# Patient Record
Sex: FEMALE | Race: WHITE | NOT HISPANIC OR LATINO | Employment: FULL TIME | URBAN - METROPOLITAN AREA
[De-identification: names, ages, dates, MRNs, and addresses within clinical notes are randomized per-mention and may not be internally consistent; named-entity substitution may affect disease eponyms.]

---

## 2023-08-30 ENCOUNTER — HOSPITAL ENCOUNTER (OUTPATIENT)
Dept: DATA CONVERSION | Facility: HOSPITAL | Age: 48
Discharge: HOME | End: 2023-08-30
Payer: COMMERCIAL

## 2023-08-30 DIAGNOSIS — M45.3 ANKYLOSING SPONDYLITIS OF CERVICOTHORACIC REGION (MULTI): ICD-10-CM

## 2023-09-01 PROBLEM — K21.9 GASTROESOPHAGEAL REFLUX DISEASE WITHOUT ESOPHAGITIS: Status: ACTIVE | Noted: 2022-06-21

## 2023-09-01 PROBLEM — K22.2 SCHATZKI'S RING: Status: ACTIVE | Noted: 2023-09-01

## 2023-09-01 PROBLEM — N95.1 PERIMENOPAUSE: Status: ACTIVE | Noted: 2023-09-01

## 2023-09-01 PROBLEM — N92.6 IRREGULAR MENSES: Status: ACTIVE | Noted: 2023-09-01

## 2023-09-01 PROBLEM — J37.0 CHRONIC LARYNGITIS: Status: ACTIVE | Noted: 2022-06-21

## 2023-09-01 PROBLEM — R40.0 DAYTIME SOMNOLENCE: Status: ACTIVE | Noted: 2023-09-01

## 2023-09-01 PROBLEM — O09.529: Status: ACTIVE | Noted: 2023-09-01

## 2023-09-01 PROBLEM — R13.10 DYSPHAGIA: Status: ACTIVE | Noted: 2022-06-21

## 2023-09-01 RX ORDER — SOD SULF/POT CHLORIDE/MAG SULF 1.479 G
TABLET ORAL
COMMUNITY
Start: 2022-10-04 | End: 2023-10-26 | Stop reason: ALTCHOICE

## 2023-09-01 RX ORDER — IBUPROFEN 600 MG/1
600 TABLET ORAL EVERY 6 HOURS PRN
COMMUNITY

## 2023-09-01 RX ORDER — ACETAMINOPHEN 325 MG/1
650 TABLET ORAL EVERY 6 HOURS PRN
COMMUNITY
End: 2024-05-30 | Stop reason: ALTCHOICE

## 2023-09-01 RX ORDER — PNV NO.95/FERROUS FUM/FOLIC AC 28MG-0.8MG
TABLET ORAL
COMMUNITY

## 2023-09-01 RX ORDER — ESCITALOPRAM OXALATE 10 MG/1
10 TABLET ORAL DAILY
COMMUNITY
Start: 2022-09-23 | End: 2023-10-26

## 2023-09-01 RX ORDER — DULOXETIN HYDROCHLORIDE 30 MG/1
1 CAPSULE, DELAYED RELEASE ORAL DAILY
COMMUNITY
End: 2023-10-26

## 2023-09-01 RX ORDER — TALC
3 POWDER (GRAM) TOPICAL NIGHTLY
COMMUNITY
End: 2023-10-26

## 2023-09-01 RX ORDER — DULOXETIN HYDROCHLORIDE 60 MG/1
1 CAPSULE, DELAYED RELEASE ORAL DAILY
COMMUNITY
End: 2023-10-23 | Stop reason: SDUPTHER

## 2023-09-01 RX ORDER — CYCLOBENZAPRINE HCL 5 MG
5 TABLET ORAL 2 TIMES DAILY
COMMUNITY
Start: 2023-04-25 | End: 2023-10-26 | Stop reason: ALTCHOICE

## 2023-09-01 RX ORDER — VITS A,C,E/LUTEIN/MINERALS 300MCG-200
400 TABLET ORAL DAILY
COMMUNITY
End: 2024-02-09 | Stop reason: ALTCHOICE

## 2023-09-01 RX ORDER — NORETHINDRONE ACETATE AND ETHINYL ESTRADIOL, ETHINYL ESTRADIOL AND FERROUS FUMARATE 1MG-10(24)
1 KIT ORAL DAILY
COMMUNITY
Start: 2022-09-23 | End: 2023-11-29 | Stop reason: WASHOUT

## 2023-09-01 RX ORDER — OMEPRAZOLE 40 MG/1
40 CAPSULE, DELAYED RELEASE ORAL DAILY
COMMUNITY
Start: 2022-07-19 | End: 2024-02-09 | Stop reason: ALTCHOICE

## 2023-09-16 VITALS
HEIGHT: 61 IN | WEIGHT: 146.4 LBS | BODY MASS INDEX: 27.64 KG/M2 | HEART RATE: 98 BPM | SYSTOLIC BLOOD PRESSURE: 118 MMHG | TEMPERATURE: 96.2 F | OXYGEN SATURATION: 100 % | DIASTOLIC BLOOD PRESSURE: 74 MMHG

## 2023-10-16 ENCOUNTER — APPOINTMENT (OUTPATIENT)
Dept: RADIOLOGY | Facility: CLINIC | Age: 48
End: 2023-10-16
Payer: COMMERCIAL

## 2023-10-20 ENCOUNTER — TELEPHONE (OUTPATIENT)
Dept: PAIN MEDICINE | Facility: CLINIC | Age: 48
End: 2023-10-20
Payer: COMMERCIAL

## 2023-10-20 NOTE — TELEPHONE ENCOUNTER
Coverage for this service has been denied for the following reason(s):    Based on [facility] Guidelines Section(s): Lower Extremity Pain with  Neurological Features (Radiculopathy, Radiculitis, or Plexopathy and Neuropathy) with or  without Low Back (Lumbar Spine) Pain (SP 6.1) and 1.0 General Guidelines, we cannot approve  this request.     Your healthcare provider told us that you are having lower back pain that travels to  your hip and/or leg. The request cannot be approved because:    Your records do not show documentation of physical exam results that included a detailed  nervous system (a network of nerve cells and fibers that send nerve messages between parts of  your body) exam performed after your symptoms started or changed that support imaging.    Imaging requires six weeks of provider directed treatment to be completed. This must have been  completed in the past three months without improved symptoms. Contact (via office visit, phone,  email, or messaging) must occur after the treatment is completed. This has not been met because:  You have not completed six weeks of provider directed treatment.  The provider directed treatment did not occur within the last three months.  Symptoms must be the same or worse after treatment to support imaging.  There was no contact with your provider after completing treatment.

## 2023-10-20 NOTE — TELEPHONE ENCOUNTER
Patient was inquiring about her MRI of the lumbar spine as she was scheduled to have it done on Monday 10-23. After further investigation, this was denied. Reason for denial below. Patient states that she has not completed PT. She states that she will call and schedule that.

## 2023-10-23 ENCOUNTER — APPOINTMENT (OUTPATIENT)
Dept: RADIOLOGY | Facility: CLINIC | Age: 48
End: 2023-10-23
Payer: COMMERCIAL

## 2023-10-23 DIAGNOSIS — F32.A DEPRESSION, UNSPECIFIED DEPRESSION TYPE: Primary | ICD-10-CM

## 2023-10-23 RX ORDER — DULOXETIN HYDROCHLORIDE 60 MG/1
60 CAPSULE, DELAYED RELEASE ORAL DAILY
Qty: 90 CAPSULE | Refills: 1 | Status: SHIPPED | OUTPATIENT
Start: 2023-10-23 | End: 2024-05-02 | Stop reason: SDUPTHER

## 2023-10-23 NOTE — TELEPHONE ENCOUNTER
Pt call for refill on meds that have been populated, pharmacy as well. Med review 06/21/2023 Please advise

## 2023-10-26 ENCOUNTER — HOSPITAL ENCOUNTER (OUTPATIENT)
Dept: GASTROENTEROLOGY | Facility: HOSPITAL | Age: 48
Discharge: HOME | End: 2023-10-26
Payer: COMMERCIAL

## 2023-10-26 ENCOUNTER — HOSPITAL ENCOUNTER (OUTPATIENT)
Dept: RADIOLOGY | Facility: HOSPITAL | Age: 48
Discharge: HOME | End: 2023-10-26
Payer: COMMERCIAL

## 2023-10-26 VITALS
WEIGHT: 145 LBS | HEART RATE: 62 BPM | RESPIRATION RATE: 17 BRPM | TEMPERATURE: 97.3 F | HEIGHT: 60 IN | BODY MASS INDEX: 28.47 KG/M2 | SYSTOLIC BLOOD PRESSURE: 130 MMHG | DIASTOLIC BLOOD PRESSURE: 90 MMHG | OXYGEN SATURATION: 99 %

## 2023-10-26 DIAGNOSIS — M54.12 CERVICAL RADICULOPATHY: Primary | ICD-10-CM

## 2023-10-26 PROCEDURE — 62321 NJX INTERLAMINAR CRV/THRC: CPT | Performed by: ANESTHESIOLOGY

## 2023-10-26 PROCEDURE — 76000 FLUOROSCOPY <1 HR PHYS/QHP: CPT

## 2023-10-26 RX ORDER — GABAPENTIN 300 MG/1
300 CAPSULE ORAL 3 TIMES DAILY
COMMUNITY
End: 2023-10-27 | Stop reason: SDUPTHER

## 2023-10-26 RX ORDER — SECUKINUMAB 150 MG/ML
150 INJECTION SUBCUTANEOUS ONCE
COMMUNITY
End: 2024-02-09 | Stop reason: ALTCHOICE

## 2023-10-26 ASSESSMENT — PAIN SCALES - GENERAL
PAINLEVEL_OUTOF10: 8

## 2023-10-26 ASSESSMENT — ENCOUNTER SYMPTOMS
PSYCHIATRIC NEGATIVE: 1
GASTROINTESTINAL NEGATIVE: 1
EYES NEGATIVE: 1
HEMATOLOGIC/LYMPHATIC NEGATIVE: 1
CONSTITUTIONAL NEGATIVE: 1
RESPIRATORY NEGATIVE: 1
CARDIOVASCULAR NEGATIVE: 1
NUMBNESS: 1
NECK PAIN: 1
ENDOCRINE NEGATIVE: 1
WEAKNESS: 1

## 2023-10-26 ASSESSMENT — PAIN DESCRIPTION - DESCRIPTORS
DESCRIPTORS: SHOOTING
DESCRIPTORS: ACHING;NAGGING;THROBBING
DESCRIPTORS: THROBBING
DESCRIPTORS: SHOOTING

## 2023-10-26 ASSESSMENT — PAIN - FUNCTIONAL ASSESSMENT: PAIN_FUNCTIONAL_ASSESSMENT: 0-10

## 2023-10-26 ASSESSMENT — COLUMBIA-SUICIDE SEVERITY RATING SCALE - C-SSRS
1. IN THE PAST MONTH, HAVE YOU WISHED YOU WERE DEAD OR WISHED YOU COULD GO TO SLEEP AND NOT WAKE UP?: NO
6. HAVE YOU EVER DONE ANYTHING, STARTED TO DO ANYTHING, OR PREPARED TO DO ANYTHING TO END YOUR LIFE?: NO
2. HAVE YOU ACTUALLY HAD ANY THOUGHTS OF KILLING YOURSELF?: NO

## 2023-10-26 NOTE — H&P
PAIN MANAGEMENT H&P    Date of Service: 10/26/2023  SUBJECTIVE    CHIEF COMPLAINT: neck pain    HISTORY OF PRESENT ILLNESS    Meghan Hernández is a 47 y.o. old female with PMH MDD, RA, ankylosing spondylitis who presents for TIM    Pain and overall medical condition unchanged from previous visit. Pt denies new-onset numbness, weakness, bowel/bladder incontinence.  Pt denies recent infection/abx use, allergy to Latex/iodine/contrast. Patient is currently taking the following blood thinner(s): N/A    REVIEW OF SYSTEMS  Review of Systems   Constitutional: Negative.    HENT: Negative.     Eyes: Negative.    Respiratory: Negative.     Cardiovascular: Negative.    Gastrointestinal: Negative.    Endocrine: Negative.    Musculoskeletal:  Positive for neck pain.   Skin: Negative.    Neurological:  Positive for weakness and numbness.   Hematological: Negative.    Psychiatric/Behavioral: Negative.         PAST MEDICAL HISTORY  History reviewed. No pertinent past medical history.  History reviewed. No pertinent surgical history.  Family History   Problem Relation Name Age of Onset    Thyroid disease Mother      Colon cancer Father      Thyroid disease Sister      Cerebral palsy Brother          1/2 brother    Stomach cancer Maternal Grandmother      Breast cancer Other AUNT     Heart attack Other UNCLE        CURRENT MEDICATIONS  Current Outpatient Medications   Medication Sig Dispense Refill    acetaminophen (TylenoL) 325 mg tablet Take 2 tablets (650 mg) by mouth every 6 hours if needed for mild pain (1 - 3).      cyanocobalamin (Vitamin B-12) 100 mcg tablet Take by mouth. As directed      DULoxetine (Cymbalta) 60 mg DR capsule Take 1 capsule (60 mg) by mouth once daily. 90 capsule 1    gabapentin (Neurontin) 300 mg capsule Take 1 capsule (300 mg) by mouth 3 times a day.      ibuprofen 600 mg tablet Take 1 tablet (600 mg) by mouth every 6 hours if needed for mild pain (1 - 3). FOR PATIENT RECEIVING KETOROLAC.  DO NO GIVE  IBUPROFEN FOR 6 HRS FOLLOWING KETOROLAC ADMIN.  NOT TO EXCEED 5 TABLES IN 24 HOURS      Lo Loestrin Fe 1 mg-10 mcg (24)/10 mcg (2) tablet Take 1 tablet by mouth once daily.      magnesium oxide (Mag-Ox) 200 mg magnesium tablet Take 2 tablets (400 mg) by mouth once daily. WITH A MEAL      omeprazole (PriLOSEC) 40 mg DR capsule Take 1 capsule (40 mg) by mouth once daily.      prenatal vit no.124/iron/folic (PRENATAL VITAMIN ORAL)       secukinumab (Cosentyx) 150 mg/mL syringe 1 mL (150 mg) 1 time. monthly       No current facility-administered medications for this encounter.       ALLERGIES AND DRUG REACTIONS  No Known Allergies       OBJECTIVE  Visit Vitals  BP (!) 146/106   Pulse 90   Temp 36.3 °C (97.3 °F) (Temporal)   Resp 16   Ht 1.524 m (5')   Wt 65.8 kg (145 lb)   SpO2 100%   BMI 28.32 kg/m²   OB Status Perimenopausal   Smoking Status Former   BSA 1.67 m²     General: Lying comfortably in bed, NAD  Head: NCAT  Eyes: Sclera/conjunctiva clear, EOMI, PERRL  Nose/mouth: MMM  CV: Good distal pulses  Lungs: Good/equal chest excursion  Abdomen: Soft, ND  Ext: No cyanosis/edema  MSK: Able to move extremities  Neuro: AAOx3, grossly normal  Psych: affect nl        REVIEW OF RADIOLOGY DATA  I have reviewed the following:  Radiology Studies           MRI c-spine 8/30/23:  There is loss of the normal cervical lordosis. No acute fracture is  identified. There is grade 1 anterolisthesis C4 on C5. There are posterior  osteophytes extending from the C3-C6 levels. No  STIR abnormalities are seen  within the marrow.     The visualized cord signal is grossly unremarkable.     C2-C3: No disc herniation. No significant canal or foraminal stenosis.  C3-C4: There is a posterior disc osteophyte complex. No significant canal or  foraminal stenosis.  C4-C5: There is a posterior disc osteophyte complex. No significant canal or  foraminal stenosis.  C5-C6: There is a posterior disc osteophyte complex. There is facet  osteoarthropathy. No  significant canal stenosis. Mild right neural foraminal  narrowing.  C6-C7: There is a posterior disc osteophyte complex. There is facet  osteoarthropathy. No significant canal stenosis. Mild right neural foraminal  narrowing.  C7-T1: No disc herniation. No significant canal or foraminal stenosis.     The paravertebral soft tissues are unremarkable.     IMPRESSION:  Mild multilevel cervical spondylosis most severe at C5-C6 and C6-C7  minimally progressed as compared to the prior examination from 2014.     ASSESSMENT & PLAN  Meghan Hernández is a 47 y.o. old female with PMH MDD, RA, ankylosing spondylitis who presents for TIM     1) Cervical radic  Neck pain since 2021 radiating to right upper extremity with subjective fourth and fifth digit numbness, tingling, weakness. Possible contribution from rheumatoid arthritis, ankylosing spondylitis  -Refractive to Tylenol, NSAIDs, muscle relaxants, Cymbalta, topical lidocaine  -MRI C-spine 8/30/2023: Multilevel spondylosis featuring grade 1 C4-5 listhesis, severe facet arthropathy at C5-6 and C6-7 with right neuroforaminal stenosis  -Referral to physical therapy to maximize conservative treatment  -Schedule cervical epidural steroid injection today to target pain generators visualized on MRI, to minimize risk of opioid use, to minimize likelihood of surgery, the complements physical therapy  -Gabapentin 300 mg TID    2) lumbosacral radiculitis  Low back pain since prior to 2008 with right leg pain, numbness, weakness. Although there is appreciable weakness in bilateral lower extremities, this appears global and more likely related to deconditioning rather than radiculopathy. Pain, however, may be related to ankylosing spondylitis  -Refractive to Tylenol, NSAIDs, muscle relaxants, Cymbalta, topical lidocaine, chiropractics  -x-ray lumbar spine 5/18/2023: L5-S1 facet arthropathy  -F/U MRI lumbar spine to evaluate neuraxial pathology in context of over 1 decade of pain, pain  severity (7/10), failure to respond to conservative treatment, interventional planning  -F/U PT  -Gabapentin as above      Discussed procedure risks/benefits in detail with patient. Pt meets medical necessity for procedure due to failure of conservative measures. Reviewed procedural risks including bleeding, infection, nerve damage, paralysis. Also reviewed mitigating factors such as screening for infection/blood thinner use, sterile precautions, and image-guidance when applicable. All questions answered. Pt/guardian expressed understanding and choose to proceed            Sofiya Mandujano MD  Anesthesiologist & Interventional Pain Physician   [facility]  O: [phone]  F: [phone]  2:36 PM  10/26/23

## 2023-10-26 NOTE — OP NOTE
Cervical Epidural Procedure Note    Procedure: The risks and benefits of treatment options and alternatives were discussed with the patient, and consent was obtained for a cervical epidural steroid injection. She wishes to proceed. She was placed in a prone position. With fluoroscopic assistance, the C7 and T1 interspace was identified. After chlorhexidine and 1% lidocaine local infiltration, an 18- gauge Touhy needle was inserted. Using cautious loss of resistance to saline technique, firm loss of resistance was appreciated at 6 cm. Aspiration revealed no cerebrospinal fluid, blood, or air.     3 cc of Omnipaque contrast was injected which revealed spread within the epidural space and a normal bilateral epidurogram on fluoroscopy. This was confirmed with AP and lateral views. After negative aspiration, 80 mg triamcinolone + 2 ml NS was easily injected for total of 4 ml injectate.    /88   Pulse 89   Temp 36.3 °C (97.3 °F) (Temporal)   Resp 16   Ht 1.524 m (5')   Wt 65.8 kg (145 lb)   SpO2 99%   BMI 28.32 kg/m²       There were no complications, and the patient tolerated the procedure well. There was no numbness or weakness at the time of discharge. She was instructed to await the effects of the steroid over the next several days, to continue with physical therapy as tolerated, and follow up with me 2 weeks.    Sofiya Mandujano MD  Interventional Pain Physician  On license of [facility]  2:40 PM  10/26/23

## 2023-10-26 NOTE — DISCHARGE INSTRUCTIONS
DISCHARGEINSTRUCTIONS FOR INJECTIONS     You underwent cervical epidural steroid injection today    Aftermost injections, it is recommended that you relax and limit your activity for the remainder of the day unless you have been told otherwise by your pain physician.  You should not drive a car, operate machinery, or make important legal decisions unless otherwise directed by your pain physician.  You may resume your normal activity, including exercise, tomorrow.      Keep a written pain diary of how much pain relief you experienced following the injection procedure and the length of time of pain relief you experienced pain relief. Following diagnostic injections like medial branch nerve blocks, sacroiliac joint blocks, stellate ganglion injections and other blocks, it is very important you record the specific amount of pain relief you experienced immediately after the injectionand how long it lasted. Your doctor will ask you for this information at your follow up visit.     For all injections, please keep the injection site dry and inspect the site for a couple of days. You may remove the Band-Aid the day of the injection at any time.     Some discomfort, bruising or slight swelling may occur at the injection site. This is not abnormal if it occurs.  If needed you may:    -Take over the counter medication such as Tylenol or Motrin.   -Apply an ice pack for 30 minutes, 2 to 3 times a day for the first 24 hours.     You may shower today; no soaking baths, hot tubs, whirlpools or swimming pools for two days.      If you are given steroids in your injection, it may take 3-5 days for the steroid medication to take effect. You may notice a worsening of your symptoms for 1-2 days after the injection. This is not abnormal.  You may use acetaminophen, ibuprofen, or prescription medication that your doctor may have prescribed for you if you need to do so.     A few common side effects of steroids include facial flushing,  sweating, restlessness, irritability,difficulty sleeping, increase in blood sugar, and increased blood pressure. If you have diabetes, please monitor your blood sugar at least once a day for at least 5 days. If you have poorly controlled high blood pressure, monitoryour blood pressure for at least 2 days and contact your primary care physician if these numbers are unusually high for you.      If you take aspirin or non-steroidal anti-inflammatory drugs (examples are Motrin, Advil, ibuprofen, Naprosyn, Voltaren, Relafen, etc.) you may restart these this evening, but stop taking it 3 days before your next appointment, unless instructed otherwiseby your physician.      You do not need to discontinue non-aspirin-containing pain medications prior to an injection (examples: Celebrex, tramadol, hydrocodone and acetaminophen).      If you take a blood thinning medication (Coumadin, Lovenox, Fragmin,Ticlid, Plavix, Pradaxa, etc.), please discuss this with your primary care physician/cardiologist and your pain physician. These medications MUST be discontinued before you can have an injection safely, without the risk of uncontrolled bleeding. If these medications are not discontinued for an appropriate period of time, you will not be able to receivean injection.      If you are taking Coumadin, please have your INR checked the morning of your procedure and bringthe result to your appointment unless otherwise instructed. If your INR is over 1.2, your injection may need to be rescheduled to avoid uncontrolled bleeding from the needle placement.     Call [facility] at [phone] between 8am-4pm Monday - Friday if you are experiencing the following:    If you received an epidural or spinal injection:    -Headache that doesnot go away with medicine, is worse when sitting or standing up, and is greatly relieved upon lying down.   -Severe pain worse than or different than your baseline pain.   -Chills or fever  (101º F or greater).   -Drainage or signs of infection at the injection site     Go directly to the Emergency Department if you are experiencing the following and received an epidural or spinal injection:   -Abrupt weakness or progressive weakness in your legs that starts after you leave the clinic.   -Abrupt severe or worsening numbness in your legs.   -Inability to urinate after the injection or loss of bowel or bladder control without the urge to defecate or urinate.     If you have a clinical question that cannot wait until your next appointment, please call [phone] between 8am-4pm Monday - Friday or send a [employer] message. We do our best to return all non-emergency messages within 24 hours, Monday - Friday. A nurse or physician will return your message.      If you need to cancel an appointment, please call the scheduling staff at [phone] during normal business hours or leave a message at least 24 hours in advance.     If you are going to be sedated for your next procedure, you MUST have responsible adult who can legally drive accompany you home. You cannot eat or drink for eight hours prior to the planned procedure if you are going to receive sedation. You may take your non-blood thinning medications with a small sip of water.

## 2023-10-27 DIAGNOSIS — M54.12 CERVICAL RADICULOPATHY: Primary | ICD-10-CM

## 2023-10-27 RX ORDER — GABAPENTIN 300 MG/1
300 CAPSULE ORAL 3 TIMES DAILY
Qty: 270 CAPSULE | Refills: 0 | Status: SHIPPED | OUTPATIENT
Start: 2023-10-27 | End: 2023-11-10 | Stop reason: ALTCHOICE

## 2023-10-27 NOTE — TELEPHONE ENCOUNTER
Patient is calling for a refill of her gabapentin 300 mg.  She is also asking if the does could possibly be increased to help with her pain.  Next follow up is 11/10/23

## 2023-11-07 ENCOUNTER — TELEPHONE (OUTPATIENT)
Dept: PRIMARY CARE | Facility: CLINIC | Age: 48
End: 2023-11-07
Payer: COMMERCIAL

## 2023-11-07 DIAGNOSIS — M54.12 CERVICAL RADICULOPATHY: Primary | ICD-10-CM

## 2023-11-07 RX ORDER — METHOCARBAMOL 750 MG/1
750 TABLET, FILM COATED ORAL 4 TIMES DAILY
Qty: 360 TABLET | Refills: 1 | Status: SHIPPED | OUTPATIENT
Start: 2023-11-07 | End: 2024-01-03 | Stop reason: SDUPTHER

## 2023-11-07 RX ORDER — METHOCARBAMOL 750 MG/1
750 TABLET, FILM COATED ORAL 4 TIMES DAILY
COMMUNITY
Start: 2023-06-21 | End: 2023-11-07 | Stop reason: SDUPTHER

## 2023-11-07 NOTE — TELEPHONE ENCOUNTER
Pt called in for refill on a med that I dont see in the list states its a muscle relaxer call Methocarmbamol 750mg 2 every 8 hours.  Pharmacy verified and populated. Please advise

## 2023-11-10 ENCOUNTER — OFFICE VISIT (OUTPATIENT)
Dept: PAIN MEDICINE | Facility: CLINIC | Age: 48
End: 2023-11-10
Payer: COMMERCIAL

## 2023-11-10 VITALS
RESPIRATION RATE: 18 BRPM | HEIGHT: 60 IN | DIASTOLIC BLOOD PRESSURE: 89 MMHG | SYSTOLIC BLOOD PRESSURE: 139 MMHG | WEIGHT: 146 LBS | BODY MASS INDEX: 28.66 KG/M2 | HEART RATE: 83 BPM

## 2023-11-10 DIAGNOSIS — M54.2 CERVICALGIA: ICD-10-CM

## 2023-11-10 DIAGNOSIS — M54.12 CERVICAL RADICULOPATHY: Primary | ICD-10-CM

## 2023-11-10 DIAGNOSIS — M54.16 LUMBAR RADICULOPATHY: ICD-10-CM

## 2023-11-10 DIAGNOSIS — G89.29 CHRONIC BILATERAL LOW BACK PAIN, UNSPECIFIED WHETHER SCIATICA PRESENT: ICD-10-CM

## 2023-11-10 DIAGNOSIS — M54.50 CHRONIC BILATERAL LOW BACK PAIN, UNSPECIFIED WHETHER SCIATICA PRESENT: ICD-10-CM

## 2023-11-10 PROCEDURE — 99214 OFFICE O/P EST MOD 30 MIN: CPT | Performed by: ANESTHESIOLOGY

## 2023-11-10 PROCEDURE — 1036F TOBACCO NON-USER: CPT | Performed by: ANESTHESIOLOGY

## 2023-11-10 RX ORDER — GABAPENTIN 600 MG/1
600 TABLET ORAL 3 TIMES DAILY
Qty: 90 TABLET | Refills: 1 | Status: SHIPPED | OUTPATIENT
Start: 2023-11-10 | End: 2024-01-03

## 2023-11-10 ASSESSMENT — PATIENT HEALTH QUESTIONNAIRE - PHQ9
10. IF YOU CHECKED OFF ANY PROBLEMS, HOW DIFFICULT HAVE THESE PROBLEMS MADE IT FOR YOU TO DO YOUR WORK, TAKE CARE OF THINGS AT HOME, OR GET ALONG WITH OTHER PEOPLE: VERY DIFFICULT
6. FEELING BAD ABOUT YOURSELF - OR THAT YOU ARE A FAILURE OR HAVE LET YOURSELF OR YOUR FAMILY DOWN: NOT AT ALL
7. TROUBLE CONCENTRATING ON THINGS, SUCH AS READING THE NEWSPAPER OR WATCHING TELEVISION: SEVERAL DAYS
SUM OF ALL RESPONSES TO PHQ QUESTIONS 1-9: 16
8. MOVING OR SPEAKING SO SLOWLY THAT OTHER PEOPLE COULD HAVE NOTICED. OR THE OPPOSITE, BEING SO FIGETY OR RESTLESS THAT YOU HAVE BEEN MOVING AROUND A LOT MORE THAN USUAL: NEARLY EVERY DAY
5. POOR APPETITE OR OVEREATING: NOT AT ALL
3. TROUBLE FALLING OR STAYING ASLEEP OR SLEEPING TOO MUCH: NEARLY EVERY DAY
2. FEELING DOWN, DEPRESSED OR HOPELESS: NEARLY EVERY DAY
4. FEELING TIRED OR HAVING LITTLE ENERGY: NEARLY EVERY DAY
SUM OF ALL RESPONSES TO PHQ9 QUESTIONS 1 AND 2: 6
9. THOUGHTS THAT YOU WOULD BE BETTER OFF DEAD, OR OF HURTING YOURSELF: NOT AT ALL
1. LITTLE INTEREST OR PLEASURE IN DOING THINGS: NEARLY EVERY DAY

## 2023-11-10 ASSESSMENT — PAIN SCALES - GENERAL
PAINLEVEL: 5
PAINLEVEL_OUTOF10: 5 - MODERATE PAIN

## 2023-11-10 ASSESSMENT — PAIN - FUNCTIONAL ASSESSMENT: PAIN_FUNCTIONAL_ASSESSMENT: 0-10

## 2023-11-10 ASSESSMENT — PAIN DESCRIPTION - DESCRIPTORS: DESCRIPTORS: ACHING

## 2023-11-10 NOTE — PROGRESS NOTES
PAIN MANAGEMENT FOLLOW-UP OFFICE NOTE    Date of Service: 11/10/2023    SUBJECTIVE    CHIEF COMPLAINT: neck pain    HISTORY OF PRESENT ILLNESS    Meghan Hernández is a 48 y.o. female with PMH MDD, RA, ankylosing spondylitis  who presents for follow-up neck pain.    Pt describes 100% relief RUE pain and 40% relief axial neck pain since TIM 10/26. Since that time, neck pain has persisted and is most noticeable with turning head to left. Pt does feel crepitus on occasion.    Pt also endorses persistence of LBP radiating to BLE assoc with weakness. MRI L-spine denied. In meantime, pt endorses hx >6 w PT without significant improvement. She is s/f return to PT next week. Taking gabapentin with mild relief w/o SE.     Pt denies new-onset numbness, weakness, bowel/bladder incontinence.  Pt denies recent infection, allergy to Latex/iodine/contrast. Patient is currently taking the following blood thinner(s): N/A    Procedure Log:  C7-T1 GINA 10/26/23: 100% relief RUE, 40% neck    REVIEW OF SYSTEMS  Review of Systems    PAST MEDICAL HISTORY  History reviewed. No pertinent past medical history.  History reviewed. No pertinent surgical history.  Family History   Problem Relation Name Age of Onset    Thyroid disease Mother      Colon cancer Father      Thyroid disease Sister      Cerebral palsy Brother          1/2 brother    Stomach cancer Maternal Grandmother      Breast cancer Other AUNT     Heart attack Other UNCLE        CURRENT MEDICATIONS  Current Outpatient Medications   Medication Sig Dispense Refill    acetaminophen (TylenoL) 325 mg tablet Take 2 tablets (650 mg) by mouth every 6 hours if needed for mild pain (1 - 3).      cyanocobalamin (Vitamin B-12) 100 mcg tablet Take by mouth. As directed      DULoxetine (Cymbalta) 60 mg DR capsule Take 1 capsule (60 mg) by mouth once daily. 90 capsule 1    gabapentin (Neurontin) 300 mg capsule Take 1 capsule (300 mg) by mouth 3 times a day. 270 capsule 0    ibuprofen 600 mg tablet  Take 1 tablet (600 mg) by mouth every 6 hours if needed for mild pain (1 - 3). FOR PATIENT RECEIVING KETOROLAC.  DO NO GIVE IBUPROFEN FOR 6 HRS FOLLOWING KETOROLAC ADMIN.  NOT TO EXCEED 5 TABLES IN 24 HOURS      Lo Loestrin Fe 1 mg-10 mcg (24)/10 mcg (2) tablet Take 1 tablet by mouth once daily.      magnesium oxide (Mag-Ox) 200 mg magnesium tablet Take 2 tablets (400 mg) by mouth once daily. WITH A MEAL      methocarbamol (Robaxin) 750 mg tablet Take 1 tablet (750 mg) by mouth 4 times a day. 360 tablet 1    omeprazole (PriLOSEC) 40 mg DR capsule Take 1 capsule (40 mg) by mouth once daily.      prenatal vit no.124/iron/folic (PRENATAL VITAMIN ORAL)       secukinumab (Cosentyx) 150 mg/mL syringe 1 mL (150 mg) 1 time. monthly       No current facility-administered medications for this visit.       ALLERGIES AND DRUG REACTIONS  No Known Allergies       OBJECTIVE  Visit Vitals  /89   Pulse 83   Resp 18   Ht 1.524 m (5')   Wt 66.2 kg (146 lb)   BMI 28.51 kg/m²   OB Status Perimenopausal   Smoking Status Former   BSA 1.67 m²       Last Recorded Pain Score (if available):                Physical Exam  General: Sitting in chair, NAD  Head: NCAT  Eyes: Sclera/conjunctiva clear, EOMI, PERRL  Nose/mouth: MMM  CV: Good distal pulses  Lungs: Good/equal chest excursion  Abdomen: Soft, ND  Ext: No cyanosis/edema  MSK: C-spine alignment: ant tilt,  ROM: Pain on extension/looking left/lateral flexion left, cervical paraspinal m NTTP  Neuro: AAOx3   Dermatome sensation to light touch  LEFT C5: WNL    RIGHT C5: WNL      LEFT C6: WNL       RIGHT C6: WNL      LEFT C7: WNL       RIGHT C7: WNL      LEFT C8: WNL       RIGHT C8: WNL      LEFT T1: WNL       RIGHT T1: WNL    LEFT L1 (lower pelvis/upper thigh): WNL    RIGHT L1: WNL      LEFT L2 (upper thigh): WNL       RIGHT: L2:WNL      LEFT L3 (medial knee): WNL       RIGHT L3: WNL      LEFT L4 (superior medial malleolus): WNL       RIGHT L4: WNL      LEFT L5 (dorsal foot): WNL        RIGHT L5: WNL      LEFT S1 (lateral foot): WNL     RIGHT S1: WNL      LEFT S2 (popliteal fossa): WNL    RIGHT S2: WNL        Motor strength  LEFT C5 (elbow flexion): 5/5   RIGHT C5: 5/5  LEFT C6 (wrist extension): 5/5     RIGHT C6: 5/5  LEFT C7 (elbow extension): 5/5     RIGHT C7: 5/5  LEFT C8 (finger abduction): 5/5     RIGHT C8: 5/5  LEFT T1 (hand ): 5/5     RIGHT T1: 5/5    LEFT L2 (hip flexion): 5/5   RIGHT L2: 5/5          LEFT L3 (knee extension): 4/5     RIGHT L3: 4/5          LEFT L4 (dorsiflexion): 5/5     RIGHT L4: 5/5          LEFT L5 (EHL extension): 5/5     RIGHT L5: 5/5          LEFT S1 (plantarflexion): 5/5     RIGHT S1: 5/5          LEFT S2 (knee flexion): 4/5      RIGHT S2: 4/5    Special testing  Forde: neg BL  DTR diminished patellar reflexes  Seated slump test neg BL  Clonus: neg BL  Babinski: neg BL    Psych: affect nl  Skin: no rash/lesions      REVIEW OF LABORATORY DATA  I have reviewed the following lab results:  WBC   Date Value Ref Range Status   09/07/2021 6.5 4.5 - 11.0 K/UL Final     RBC   Date Value Ref Range Status   09/07/2021 4.48 4.0 - 4.9 M/UL Final     Hemoglobin   Date Value Ref Range Status   09/07/2021 13.1 12.0 - 15.0 GM/DL Final     Hematocrit   Date Value Ref Range Status   09/07/2021 41.5 36 - 44 % Final     MCV   Date Value Ref Range Status   09/07/2021 92.6 80 - 100 FL Final     MCH   Date Value Ref Range Status   09/07/2021 29.2 26 - 34 PG Final     MCHC   Date Value Ref Range Status   09/07/2021 31.6 31 - 37 % Final     Platelets   Date Value Ref Range Status   09/07/2021 246 150 - 450 K/UL Final     MPV   Date Value Ref Range Status   09/07/2021 11.0 7.0 - 12.6 CU Final     Sodium   Date Value Ref Range Status   09/07/2021 140 133 - 145 MMOL/L Final     Potassium   Date Value Ref Range Status   09/07/2021 4.2 3.4 - 5.1 MMOL/L Final     Bicarbonate   Date Value Ref Range Status   09/07/2021 24 24 - 31 MMOL/L Final     Urea Nitrogen   Date Value Ref Range Status  "  09/07/2021 4 (L) 8 - 25 MG/DL Final     Calcium   Date Value Ref Range Status   09/07/2021 9.4 8.5 - 10.4 MG/DL Final     No results found for: \"PROTIME\", \"PTT\", \"INR\", \"FIBRINOGEN\"      REVIEW OF RADIOLOGY   I have reviewed the following:  Radiology Studies           MRI c-spine 8/30/23:  There is loss of the normal cervical lordosis. No acute fracture is  identified. There is grade 1 anterolisthesis C4 on C5. There are posterior  osteophytes extending from the C3-C6 levels. No  STIR abnormalities are seen  within the marrow.     The visualized cord signal is grossly unremarkable.     C2-C3: No disc herniation. No significant canal or foraminal stenosis.  C3-C4: There is a posterior disc osteophyte complex. No significant canal or  foraminal stenosis.  C4-C5: There is a posterior disc osteophyte complex. No significant canal or  foraminal stenosis.  C5-C6: There is a posterior disc osteophyte complex. There is facet  osteoarthropathy. No significant canal stenosis. Mild right neural foraminal  narrowing.  C6-C7: There is a posterior disc osteophyte complex. There is facet  osteoarthropathy. No significant canal stenosis. Mild right neural foraminal  narrowing.  C7-T1: No disc herniation. No significant canal or foraminal stenosis.     The paravertebral soft tissues are unremarkable.     IMPRESSION:  Mild multilevel cervical spondylosis most severe at C5-C6 and C6-C7  minimally progressed as compared to the prior examination from 2014.            ASSESSMENT & PLAN  Meghan Hernández is a 48 y.o. old female with PMH MDD, RA, ankylosing spondylitis who presents for F/U neck pain    1) Cervical radic  Neck pain since 2021 radiating to right upper extremity with subjective fourth and fifth digit numbness, tingling, weakness. Possible contribution from rheumatoid arthritis, ankylosing spondylitis  -Refractive to Tylenol, NSAIDs, muscle relaxants, Cymbalta, topical lidocaine  -MRI C-spine 8/30/2023: Multilevel spondylosis " featuring grade 1 C4-5 listhesis, severe facet arthropathy at C5-6 and C6-7 with right neuroforaminal stenosis  -F/U PT next week  -C7-T1 GINA 10/26/23: 100% RUE relief, 40% neck pain relief  -Schedule repeat C7-T1 GINA for cumulative effect to target pain generators visualized on MRI, to minimize risk of opioid use, to minimize likelihood of surgery, the complements physical therapy  -Inc gabapentin to 600 mg TID     2) lumbosacral radiculitis  -Low back pain since prior to 2008 with right leg pain, numbness, weakness. There is appreciable weakness in bilateral lower extremities possibly related to deconditioning rather than radiculopathy. Pain, however, may be related to ankylosing spondylitis  -Refractive to Tylenol, NSAIDs, muscle relaxants, Cymbalta, topical lidocaine, chiropractics, >6 w PT  -x-ray lumbar spine 5/18/2023: L5-S1 facet arthropathy  -Re-order MRI lumbar spine to evaluate neuraxial pathology in context of over 1 decade of pain, pain severity (7/10), failure to respond to conservative treatment- including yrs of med mgmt & >6 w PT, interventional planning  -Return to PT next week  -Gabapentin as above      Discussed procedure risks/benefits in detail with patient. Pt meets medical necessity for procedure due to failure of conservative measures. Reviewed procedural risks including bleeding, infection, nerve damage, paralysis. Also reviewed mitigating factors such as screening for infection/blood thinner use, sterile precautions, and image-guidance when applicable. All questions answered. Pt/guardian expressed understanding and choose to proceed            Sofiya Mandujano MD  Anesthesiologist & Interventional Pain Physician   [facility] [place]  O: [phone]  F: [phone]  9:16 AM  11/10/23
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
## 2023-11-14 PROBLEM — M54.17 LUMBOSACRAL RADICULITIS: Status: ACTIVE | Noted: 2023-11-14

## 2023-11-14 PROBLEM — M54.16 LUMBAR RADICULOPATHY: Status: ACTIVE | Noted: 2023-11-14

## 2023-11-15 ENCOUNTER — EVALUATION (OUTPATIENT)
Dept: PHYSICAL THERAPY | Facility: CLINIC | Age: 48
End: 2023-11-15
Payer: COMMERCIAL

## 2023-11-15 DIAGNOSIS — M54.17 RADICULOPATHY, LUMBOSACRAL REGION: Primary | ICD-10-CM

## 2023-11-15 DIAGNOSIS — M54.12 CERVICAL RADICULITIS: ICD-10-CM

## 2023-11-15 DIAGNOSIS — M54.17 LUMBOSACRAL RADICULITIS: ICD-10-CM

## 2023-11-15 DIAGNOSIS — M54.12 RADICULOPATHY, CERVICAL REGION: ICD-10-CM

## 2023-11-15 PROBLEM — M54.16 LUMBAR RADICULOPATHY: Status: RESOLVED | Noted: 2023-11-14 | Resolved: 2023-11-15

## 2023-11-15 PROCEDURE — 97162 PT EVAL MOD COMPLEX 30 MIN: CPT | Mod: GP | Performed by: PHYSICAL THERAPIST

## 2023-11-15 ASSESSMENT — PAIN - FUNCTIONAL ASSESSMENT: PAIN_FUNCTIONAL_ASSESSMENT: 0-10

## 2023-11-15 NOTE — Clinical Note
November 15, 2023     Patient: Meghan Hernández   YOB: 1975   Date of Visit: 11/15/2023       To Whom it May Concern:    Meghan Hernández was seen in my clinic on 11/15/2023. She {Return to school/sport:01236}.    If you have any questions or concerns, please don't hesitate to call.         Sincerely,          Ghada Kenyon, PT        CC: No Recipients

## 2023-11-15 NOTE — Clinical Note
November 15, 2023     Patient: Meghan Hernández   YOB: 1975   Date of Visit: 11/15/2023       To Whom It May Concern:    It is my medical opinion that Meghan Hernández {Work release (duty restriction):00948}.    If you have any questions or concerns, please don't hesitate to call.         Sincerely,        Ghada Kenyon, PT    CC: No Recipients

## 2023-11-22 ENCOUNTER — TELEPHONE (OUTPATIENT)
Dept: PAIN MEDICINE | Facility: CLINIC | Age: 48
End: 2023-11-22
Payer: COMMERCIAL

## 2023-11-24 NOTE — PROGRESS NOTES
Physical Therapy Treatment    Patient Name: Meghan Hernández  MRN: [id number]  Encounter date:  11/29/2023  Time Calculation  Start Time: 1301  Stop Time: 1539  Time Calculation (min): 158 min    Visit Number:  2 / 12   Visit Authorized:  12    Current Problem  1. Radiculopathy, lumbosacral region        2. Radiculopathy, cervical region        3. Lumbosacral radiculitis  Follow Up In Physical Therapy      4. Cervical radiculitis  Follow Up In Physical Therapy          Precautions  Precautions  Precautions Comment: Cx ankylosing spondylosis; Monitor Cx and lx pain/symptoms.  Modify activity as needed      Pain  Pain Score: 6 (Neck: 6, Back 5: ;post treatment 7)  Pain Location:  (back, neck, knees)    Subjective  General  General Comment: Achey after IE    Objective  Fair core control    Aquatic Therapy:      bilateral handrail assist    Walk across pool for core control, balance and acclimation to water:  forward/backward/side step x 3 laps each    At HR with UE support:    Heel raise x 20 reps  Toe raise x 20 reps  Hip SLR x 12 reps  Hip ABD side step 12 reps    March across pool with/without noodle x ** laps    At steps:  HS stretch 2 sec x 20 reps R/L LE's    At bench:  LAQ's 1 x 12 reps  Marching  1 x 12 reps  Ankle pumps  1 x 20 reps  Bicycle  1 x 12 reps  Row 112x  Bicep curls 12x  IR/ER 12x  Slow punch outs 12x  Scapular retraction, palms up 12x    Stand:  HS curls x 12 reps    Water walk with noodle under arms to help distract 2 laps.    Billed Treatment Time:  Aquatic Exercise 30 min    Activity tolerance:  Developed some pinching in the back after seated JHON.  Fair tolerance today as it was pt's first time in the water performing JHON    OP EDUCATION:  Outpatient Education  Individual(s) Educated: Patient  Education Comment: Pt made aware she may have increased soreness within the first 24 hours after the water session.  This can be a common response the first few session in the water    Assessment:  PT  Assessment  Assessment Comment: Fair.  Increase in pain afterwards. Pt needed to pull her self up using both handrails to ascend the pool stairs  Pt's response to treatment:  fair  Areas of improvements:  pt introduced to water exercises  Limitations/deficits:  pain, weakness, ROM    Plan:     Continue with current POC/no changes    Assessment of current progress against goals:  Insufficient treatment time to assess progress    OP Education:  Outpatient Education  Individual(s) Educated: Patient  Education Comment: Pt made aware she may have increased soreness within the first 24 hours after the water session.  This can be a common response the first few session in the water    Goals:

## 2023-11-29 ENCOUNTER — TREATMENT (OUTPATIENT)
Dept: PHYSICAL THERAPY | Facility: CLINIC | Age: 48
End: 2023-11-29
Payer: COMMERCIAL

## 2023-11-29 ENCOUNTER — OFFICE VISIT (OUTPATIENT)
Dept: PAIN MEDICINE | Facility: CLINIC | Age: 48
End: 2023-11-29
Payer: COMMERCIAL

## 2023-11-29 VITALS
BODY MASS INDEX: 28.51 KG/M2 | HEART RATE: 84 BPM | DIASTOLIC BLOOD PRESSURE: 82 MMHG | WEIGHT: 146 LBS | SYSTOLIC BLOOD PRESSURE: 120 MMHG

## 2023-11-29 DIAGNOSIS — M54.12 CERVICAL RADICULOPATHY: ICD-10-CM

## 2023-11-29 DIAGNOSIS — M54.17 LUMBOSACRAL RADICULITIS: ICD-10-CM

## 2023-11-29 DIAGNOSIS — M54.17 RADICULOPATHY, LUMBOSACRAL REGION: Primary | ICD-10-CM

## 2023-11-29 DIAGNOSIS — M54.12 RADICULOPATHY, CERVICAL REGION: ICD-10-CM

## 2023-11-29 DIAGNOSIS — M54.12 CERVICAL RADICULITIS: ICD-10-CM

## 2023-11-29 PROCEDURE — 97113 AQUATIC THERAPY/EXERCISES: CPT | Mod: GP | Performed by: PHYSICAL THERAPIST

## 2023-11-29 PROCEDURE — 1036F TOBACCO NON-USER: CPT | Performed by: ANESTHESIOLOGY

## 2023-11-29 PROCEDURE — 99214 OFFICE O/P EST MOD 30 MIN: CPT | Performed by: ANESTHESIOLOGY

## 2023-11-29 ASSESSMENT — ENCOUNTER SYMPTOMS
RESPIRATORY NEGATIVE: 1
PSYCHIATRIC NEGATIVE: 1
NUMBNESS: 1
EYES NEGATIVE: 1
CONSTITUTIONAL NEGATIVE: 1
NECK PAIN: 1
BACK PAIN: 1
HEMATOLOGIC/LYMPHATIC NEGATIVE: 1
WEAKNESS: 1
GASTROINTESTINAL NEGATIVE: 1
CARDIOVASCULAR NEGATIVE: 1
ENDOCRINE NEGATIVE: 1

## 2023-11-29 ASSESSMENT — PAIN SCALES - GENERAL
PAINLEVEL_OUTOF10: 6
PAINLEVEL: 6
PAINLEVEL_OUTOF10: 6

## 2023-11-29 ASSESSMENT — PAIN DESCRIPTION - DESCRIPTORS: DESCRIPTORS: SHARP;STABBING;THROBBING

## 2023-11-29 ASSESSMENT — PAIN - FUNCTIONAL ASSESSMENT: PAIN_FUNCTIONAL_ASSESSMENT: 0-10

## 2023-11-29 NOTE — PROGRESS NOTES
PAIN MANAGEMENT FOLLOW-UP OFFICE NOTE    Date of Service: 11/29/2023    SUBJECTIVE    CHIEF COMPLAINT: neck pain    HISTORY OF PRESENT ILLNESS    Meghan Hernández is a 48 y.o. female with PMH MDD, RA, ankylosing spondylitis  who presents for follow-up neck pain.     Pt describes ongoing 100% relief RUE pain and 40% relief axial neck pain since TIM 10/26. Since that time, neck pain has persisted and is most noticeable with turning head to left. Pt does feel crepitus on occasion. Repeat TIM initially denied, but approved after P2P. Pt wishes to schedule.    Pt also endorses persistence of LBP radiating to BLE assoc with weakness. MRI L-spine denied again. She asks about next steps.    Pt denies new-onset numbness, weakness, bowel/bladder incontinence.  Pt denies recent infection, allergy to Latex/iodine/contrast. Patient is currently taking the following blood thinner(s): N/A    Procedure Log:  C7-T1 GINA 10/26/23: 100% relief RUE, 40% neck    REVIEW OF SYSTEMS  Review of Systems   Constitutional: Negative.    HENT: Negative.     Eyes: Negative.    Respiratory: Negative.     Cardiovascular: Negative.    Gastrointestinal: Negative.    Endocrine: Negative.    Musculoskeletal:  Positive for back pain and neck pain.   Skin: Negative.    Neurological:  Positive for weakness and numbness.   Hematological: Negative.    Psychiatric/Behavioral: Negative.         PAST MEDICAL HISTORY  History reviewed. No pertinent past medical history.  History reviewed. No pertinent surgical history.  Family History   Problem Relation Name Age of Onset    Thyroid disease Mother      Colon cancer Father      Thyroid disease Sister      Cerebral palsy Brother          1/2 brother    Stomach cancer Maternal Grandmother      Breast cancer Other AUNT     Heart attack Other UNCLE        CURRENT MEDICATIONS  Current Outpatient Medications   Medication Sig Dispense Refill    acetaminophen (TylenoL) 325 mg tablet Take 2 tablets (650 mg) by mouth every 6  hours if needed for mild pain (1 - 3).      cyanocobalamin (Vitamin B-12) 100 mcg tablet Take by mouth. As directed      DULoxetine (Cymbalta) 60 mg DR capsule Take 1 capsule (60 mg) by mouth once daily. 90 capsule 1    gabapentin (Neurontin) 600 mg tablet Take 1 tablet (600 mg) by mouth 3 times a day. 90 tablet 1    ibuprofen 600 mg tablet Take 1 tablet (600 mg) by mouth every 6 hours if needed for mild pain (1 - 3). FOR PATIENT RECEIVING KETOROLAC.  DO NO GIVE IBUPROFEN FOR 6 HRS FOLLOWING KETOROLAC ADMIN.  NOT TO EXCEED 5 TABLES IN 24 HOURS      magnesium oxide (Mag-Ox) 200 mg magnesium tablet Take 2 tablets (400 mg) by mouth once daily. WITH A MEAL      methocarbamol (Robaxin) 750 mg tablet Take 1 tablet (750 mg) by mouth 4 times a day. 360 tablet 1    omeprazole (PriLOSEC) 40 mg DR capsule Take 1 capsule (40 mg) by mouth once daily.      prenatal vit no.124/iron/folic (PRENATAL VITAMIN ORAL)       secukinumab (Cosentyx) 150 mg/mL syringe 1 mL (150 mg) 1 time. monthly      Lo Loestrin Fe 1 mg-10 mcg (24)/10 mcg (2) tablet Take 1 tablet by mouth once daily.       No current facility-administered medications for this visit.       ALLERGIES AND DRUG REACTIONS  No Known Allergies       OBJECTIVE  Visit Vitals  /82   Pulse 84   Wt 66.2 kg (146 lb)   BMI 28.51 kg/m²   OB Status Perimenopausal   Smoking Status Former   BSA 1.67 m²       Last Recorded Pain Score (if available):                Physical Exam  Vitals and nursing note reviewed.       General: Sitting in chair, NAD  Head: NCAT  Eyes: Sclera/conjunctiva clear, EOMI, PERRL  Nose/mouth: MMM  CV: Good distal pulses  Lungs: Good/equal chest excursion  Abdomen: Soft, ND  Ext: No cyanosis/edema  MSK: C-spine alignment: ant tilt,  ROM: Pain on extension/looking left/lateral flexion left, cervical paraspinal m NTTP  Neuro: AAOx3   Dermatome sensation to light touch  LEFT C5: WNL    RIGHT C5: WNL      LEFT C6: WNL       RIGHT C6: WNL      LEFT C7: WNL        RIGHT C7: WNL      LEFT C8: WNL       RIGHT C8: WNL      LEFT T1: WNL       RIGHT T1: WNL    LEFT L1 (lower pelvis/upper thigh): WNL    RIGHT L1: WNL      LEFT L2 (upper thigh): WNL       RIGHT: L2:WNL      LEFT L3 (medial knee): WNL       RIGHT L3: WNL      LEFT L4 (superior medial malleolus): WNL       RIGHT L4: WNL      LEFT L5 (dorsal foot): WNL       RIGHT L5: WNL      LEFT S1 (lateral foot): WNL     RIGHT S1: WNL      LEFT S2 (popliteal fossa): WNL    RIGHT S2: WNL        Motor strength  LEFT C5 (elbow flexion): 5/5   RIGHT C5: 5/5  LEFT C6 (wrist extension): 5/5     RIGHT C6: 5/5  LEFT C7 (elbow extension): 5/5     RIGHT C7: 5/5  LEFT C8 (finger abduction): 5/5     RIGHT C8: 5/5  LEFT T1 (hand ): 5/5     RIGHT T1: 5/5    LEFT L2 (hip flexion): 5/5   RIGHT L2: 5/5          LEFT L3 (knee extension): 4/5     RIGHT L3: 4/5          LEFT L4 (dorsiflexion): 5/5     RIGHT L4: 5/5          LEFT L5 (EHL extension): 5/5     RIGHT L5: 5/5          LEFT S1 (plantarflexion): 5/5     RIGHT S1: 5/5          LEFT S2 (knee flexion): 4/5      RIGHT S2: 4/5    Special testing  Forde: neg BL  DTR diminished patellar reflexes  Seated slump test neg BL  Clonus: neg BL  Babinski: neg BL    Psych: affect nl  Skin: no rash/lesions      REVIEW OF LABORATORY DATA  I have reviewed the following lab results:  WBC   Date Value Ref Range Status   09/07/2021 6.5 4.5 - 11.0 K/UL Final     RBC   Date Value Ref Range Status   09/07/2021 4.48 4.0 - 4.9 M/UL Final     Hemoglobin   Date Value Ref Range Status   09/07/2021 13.1 12.0 - 15.0 GM/DL Final     Hematocrit   Date Value Ref Range Status   09/07/2021 41.5 36 - 44 % Final     MCV   Date Value Ref Range Status   09/07/2021 92.6 80 - 100 FL Final     MCH   Date Value Ref Range Status   09/07/2021 29.2 26 - 34 PG Final     MCHC   Date Value Ref Range Status   09/07/2021 31.6 31 - 37 % Final     Platelets   Date Value Ref Range Status   09/07/2021 246 150 - 450 K/UL Final     MPV   Date  "Value Ref Range Status   09/07/2021 11.0 7.0 - 12.6 CU Final     Sodium   Date Value Ref Range Status   09/07/2021 140 133 - 145 MMOL/L Final     Potassium   Date Value Ref Range Status   09/07/2021 4.2 3.4 - 5.1 MMOL/L Final     Bicarbonate   Date Value Ref Range Status   09/07/2021 24 24 - 31 MMOL/L Final     Urea Nitrogen   Date Value Ref Range Status   09/07/2021 4 (L) 8 - 25 MG/DL Final     Calcium   Date Value Ref Range Status   09/07/2021 9.4 8.5 - 10.4 MG/DL Final     No results found for: \"PROTIME\", \"PTT\", \"INR\", \"FIBRINOGEN\"      REVIEW OF RADIOLOGY   I have reviewed the following:  Radiology Studies           MRI c-spine 8/30/23:  There is loss of the normal cervical lordosis. No acute fracture is  identified. There is grade 1 anterolisthesis C4 on C5. There are posterior  osteophytes extending from the C3-C6 levels. No  STIR abnormalities are seen  within the marrow.     The visualized cord signal is grossly unremarkable.     C2-C3: No disc herniation. No significant canal or foraminal stenosis.  C3-C4: There is a posterior disc osteophyte complex. No significant canal or  foraminal stenosis.  C4-C5: There is a posterior disc osteophyte complex. No significant canal or  foraminal stenosis.  C5-C6: There is a posterior disc osteophyte complex. There is facet  osteoarthropathy. No significant canal stenosis. Mild right neural foraminal  narrowing.  C6-C7: There is a posterior disc osteophyte complex. There is facet  osteoarthropathy. No significant canal stenosis. Mild right neural foraminal  narrowing.  C7-T1: No disc herniation. No significant canal or foraminal stenosis.     The paravertebral soft tissues are unremarkable.     IMPRESSION:  Mild multilevel cervical spondylosis most severe at C5-C6 and C6-C7  minimally progressed as compared to the prior examination from 2014.            ASSESSMENT & PLAN  Meghan Hernández is a 48 y.o. old female with PMH MDD, RA, ankylosing spondylitis who presents for F/U " neck pain    1) Cervical radic  Neck pain since 2021 radiating to right upper extremity with subjective fourth and fifth digit numbness, tingling, weakness. Possible contribution from rheumatoid arthritis, ankylosing spondylitis  -Refractive to Tylenol, NSAIDs, muscle relaxants, Cymbalta, topical lidocaine, >6 w PT  -MRI C-spine 8/30/2023: Multilevel spondylosis featuring grade 1 C4-5 listhesis, severe facet arthropathy at C5-6 and C6-7 with right neuroforaminal stenosis  -C7-T1 GINA 10/26/23: 100% RUE relief, 40% neck pain relief  -Schedule repeat C7-T1 GINA for cumulative effect to target pain generators visualized on MRI, to minimize risk of opioid use, to minimize likelihood of surgery, the complements physical therapy  -Cont gabapentin 600 mg TID     2) lumbosacral radiculitis  -Low back pain since prior to 2008 with right leg pain, numbness, weakness. There is appreciable weakness in bilateral lower extremities possibly related to deconditioning rather than radiculopathy. Pain, however, may be related to ankylosing spondylitis  -Refractive to Tylenol, NSAIDs, muscle relaxants, Cymbalta, topical lidocaine, chiropractics, >6 w PT. Cannot tolerate return to PT due to pain  -x-ray lumbar spine 5/18/2023: L5-S1 facet arthropathy  -MRI denied by insurance. Order CT L-spine to evaluate neuraxial pathology in context of over 1 decade of pain, pain severity (7/10), failure to respond to conservative treatment- including yrs of med mgmt & >6 w PT-, and interventional planning  -Gabapentin as above      Discussed procedure risks/benefits in detail with patient. Pt meets medical necessity for procedure due to failure of conservative measures. Reviewed procedural risks including bleeding, infection, nerve damage, paralysis. Also reviewed mitigating factors such as screening for infection/blood thinner use, sterile precautions, and image-guidance when applicable. All questions answered. Pt/guardian expressed understanding and  choose to proceed            Sofiya Mandujano MD  Anesthesiologist & Interventional Pain Physician   [facility]  O: [phone]  F: [phone]  2:01 PM  11/29/23

## 2023-11-29 NOTE — H&P (VIEW-ONLY)
PAIN MANAGEMENT FOLLOW-UP OFFICE NOTE    Date of Service: 11/29/2023    SUBJECTIVE    CHIEF COMPLAINT: neck pain    HISTORY OF PRESENT ILLNESS    Meghan Hernández is a 48 y.o. female with PMH MDD, RA, ankylosing spondylitis  who presents for follow-up neck pain.     Pt describes ongoing 100% relief RUE pain and 40% relief axial neck pain since ITM 10/26. Since that time, neck pain has persisted and is most noticeable with turning head to left. Pt does feel crepitus on occasion. Repeat TIM initially denied, but approved after P2P. Pt wishes to schedule.    Pt also endorses persistence of LBP radiating to BLE assoc with weakness. MRI L-spine denied again. She asks about next steps.    Pt denies new-onset numbness, weakness, bowel/bladder incontinence.  Pt denies recent infection, allergy to Latex/iodine/contrast. Patient is currently taking the following blood thinner(s): N/A    Procedure Log:  C7-T1 GINA 10/26/23: 100% relief RUE, 40% neck    REVIEW OF SYSTEMS  Review of Systems   Constitutional: Negative.    HENT: Negative.     Eyes: Negative.    Respiratory: Negative.     Cardiovascular: Negative.    Gastrointestinal: Negative.    Endocrine: Negative.    Musculoskeletal:  Positive for back pain and neck pain.   Skin: Negative.    Neurological:  Positive for weakness and numbness.   Hematological: Negative.    Psychiatric/Behavioral: Negative.         PAST MEDICAL HISTORY  History reviewed. No pertinent past medical history.  History reviewed. No pertinent surgical history.  Family History   Problem Relation Name Age of Onset    Thyroid disease Mother      Colon cancer Father      Thyroid disease Sister      Cerebral palsy Brother          1/2 brother    Stomach cancer Maternal Grandmother      Breast cancer Other AUNT     Heart attack Other UNCLE        CURRENT MEDICATIONS  Current Outpatient Medications   Medication Sig Dispense Refill    acetaminophen (TylenoL) 325 mg tablet Take 2 tablets (650 mg) by mouth every 6  hours if needed for mild pain (1 - 3).      cyanocobalamin (Vitamin B-12) 100 mcg tablet Take by mouth. As directed      DULoxetine (Cymbalta) 60 mg DR capsule Take 1 capsule (60 mg) by mouth once daily. 90 capsule 1    gabapentin (Neurontin) 600 mg tablet Take 1 tablet (600 mg) by mouth 3 times a day. 90 tablet 1    ibuprofen 600 mg tablet Take 1 tablet (600 mg) by mouth every 6 hours if needed for mild pain (1 - 3). FOR PATIENT RECEIVING KETOROLAC.  DO NO GIVE IBUPROFEN FOR 6 HRS FOLLOWING KETOROLAC ADMIN.  NOT TO EXCEED 5 TABLES IN 24 HOURS      magnesium oxide (Mag-Ox) 200 mg magnesium tablet Take 2 tablets (400 mg) by mouth once daily. WITH A MEAL      methocarbamol (Robaxin) 750 mg tablet Take 1 tablet (750 mg) by mouth 4 times a day. 360 tablet 1    omeprazole (PriLOSEC) 40 mg DR capsule Take 1 capsule (40 mg) by mouth once daily.      prenatal vit no.124/iron/folic (PRENATAL VITAMIN ORAL)       secukinumab (Cosentyx) 150 mg/mL syringe 1 mL (150 mg) 1 time. monthly      Lo Loestrin Fe 1 mg-10 mcg (24)/10 mcg (2) tablet Take 1 tablet by mouth once daily.       No current facility-administered medications for this visit.       ALLERGIES AND DRUG REACTIONS  No Known Allergies       OBJECTIVE  Visit Vitals  /82   Pulse 84   Wt 66.2 kg (146 lb)   BMI 28.51 kg/m²   OB Status Perimenopausal   Smoking Status Former   BSA 1.67 m²       Last Recorded Pain Score (if available):                Physical Exam  Vitals and nursing note reviewed.       General: Sitting in chair, NAD  Head: NCAT  Eyes: Sclera/conjunctiva clear, EOMI, PERRL  Nose/mouth: MMM  CV: Good distal pulses  Lungs: Good/equal chest excursion  Abdomen: Soft, ND  Ext: No cyanosis/edema  MSK: C-spine alignment: ant tilt,  ROM: Pain on extension/looking left/lateral flexion left, cervical paraspinal m NTTP  Neuro: AAOx3   Dermatome sensation to light touch  LEFT C5: WNL    RIGHT C5: WNL      LEFT C6: WNL       RIGHT C6: WNL      LEFT C7: WNL        RIGHT C7: WNL      LEFT C8: WNL       RIGHT C8: WNL      LEFT T1: WNL       RIGHT T1: WNL    LEFT L1 (lower pelvis/upper thigh): WNL    RIGHT L1: WNL      LEFT L2 (upper thigh): WNL       RIGHT: L2:WNL      LEFT L3 (medial knee): WNL       RIGHT L3: WNL      LEFT L4 (superior medial malleolus): WNL       RIGHT L4: WNL      LEFT L5 (dorsal foot): WNL       RIGHT L5: WNL      LEFT S1 (lateral foot): WNL     RIGHT S1: WNL      LEFT S2 (popliteal fossa): WNL    RIGHT S2: WNL        Motor strength  LEFT C5 (elbow flexion): 5/5   RIGHT C5: 5/5  LEFT C6 (wrist extension): 5/5     RIGHT C6: 5/5  LEFT C7 (elbow extension): 5/5     RIGHT C7: 5/5  LEFT C8 (finger abduction): 5/5     RIGHT C8: 5/5  LEFT T1 (hand ): 5/5     RIGHT T1: 5/5    LEFT L2 (hip flexion): 5/5   RIGHT L2: 5/5          LEFT L3 (knee extension): 4/5     RIGHT L3: 4/5          LEFT L4 (dorsiflexion): 5/5     RIGHT L4: 5/5          LEFT L5 (EHL extension): 5/5     RIGHT L5: 5/5          LEFT S1 (plantarflexion): 5/5     RIGHT S1: 5/5          LEFT S2 (knee flexion): 4/5      RIGHT S2: 4/5    Special testing  Forde: neg BL  DTR diminished patellar reflexes  Seated slump test neg BL  Clonus: neg BL  Babinski: neg BL    Psych: affect nl  Skin: no rash/lesions      REVIEW OF LABORATORY DATA  I have reviewed the following lab results:  WBC   Date Value Ref Range Status   09/07/2021 6.5 4.5 - 11.0 K/UL Final     RBC   Date Value Ref Range Status   09/07/2021 4.48 4.0 - 4.9 M/UL Final     Hemoglobin   Date Value Ref Range Status   09/07/2021 13.1 12.0 - 15.0 GM/DL Final     Hematocrit   Date Value Ref Range Status   09/07/2021 41.5 36 - 44 % Final     MCV   Date Value Ref Range Status   09/07/2021 92.6 80 - 100 FL Final     MCH   Date Value Ref Range Status   09/07/2021 29.2 26 - 34 PG Final     MCHC   Date Value Ref Range Status   09/07/2021 31.6 31 - 37 % Final     Platelets   Date Value Ref Range Status   09/07/2021 246 150 - 450 K/UL Final     MPV   Date  "Value Ref Range Status   09/07/2021 11.0 7.0 - 12.6 CU Final     Sodium   Date Value Ref Range Status   09/07/2021 140 133 - 145 MMOL/L Final     Potassium   Date Value Ref Range Status   09/07/2021 4.2 3.4 - 5.1 MMOL/L Final     Bicarbonate   Date Value Ref Range Status   09/07/2021 24 24 - 31 MMOL/L Final     Urea Nitrogen   Date Value Ref Range Status   09/07/2021 4 (L) 8 - 25 MG/DL Final     Calcium   Date Value Ref Range Status   09/07/2021 9.4 8.5 - 10.4 MG/DL Final     No results found for: \"PROTIME\", \"PTT\", \"INR\", \"FIBRINOGEN\"      REVIEW OF RADIOLOGY   I have reviewed the following:  Radiology Studies           MRI c-spine 8/30/23:  There is loss of the normal cervical lordosis. No acute fracture is  identified. There is grade 1 anterolisthesis C4 on C5. There are posterior  osteophytes extending from the C3-C6 levels. No  STIR abnormalities are seen  within the marrow.     The visualized cord signal is grossly unremarkable.     C2-C3: No disc herniation. No significant canal or foraminal stenosis.  C3-C4: There is a posterior disc osteophyte complex. No significant canal or  foraminal stenosis.  C4-C5: There is a posterior disc osteophyte complex. No significant canal or  foraminal stenosis.  C5-C6: There is a posterior disc osteophyte complex. There is facet  osteoarthropathy. No significant canal stenosis. Mild right neural foraminal  narrowing.  C6-C7: There is a posterior disc osteophyte complex. There is facet  osteoarthropathy. No significant canal stenosis. Mild right neural foraminal  narrowing.  C7-T1: No disc herniation. No significant canal or foraminal stenosis.     The paravertebral soft tissues are unremarkable.     IMPRESSION:  Mild multilevel cervical spondylosis most severe at C5-C6 and C6-C7  minimally progressed as compared to the prior examination from 2014.            ASSESSMENT & PLAN  Meghan Hernández is a 48 y.o. old female with PMH MDD, RA, ankylosing spondylitis who presents for F/U " neck pain    1) Cervical radic  Neck pain since 2021 radiating to right upper extremity with subjective fourth and fifth digit numbness, tingling, weakness. Possible contribution from rheumatoid arthritis, ankylosing spondylitis  -Refractive to Tylenol, NSAIDs, muscle relaxants, Cymbalta, topical lidocaine, >6 w PT  -MRI C-spine 8/30/2023: Multilevel spondylosis featuring grade 1 C4-5 listhesis, severe facet arthropathy at C5-6 and C6-7 with right neuroforaminal stenosis  -C7-T1 GINA 10/26/23: 100% RUE relief, 40% neck pain relief  -Schedule repeat C7-T1 GINA for cumulative effect to target pain generators visualized on MRI, to minimize risk of opioid use, to minimize likelihood of surgery, the complements physical therapy  -Cont gabapentin 600 mg TID     2) lumbosacral radiculitis  -Low back pain since prior to 2008 with right leg pain, numbness, weakness. There is appreciable weakness in bilateral lower extremities possibly related to deconditioning rather than radiculopathy. Pain, however, may be related to ankylosing spondylitis  -Refractive to Tylenol, NSAIDs, muscle relaxants, Cymbalta, topical lidocaine, chiropractics, >6 w PT. Cannot tolerate return to PT due to pain  -x-ray lumbar spine 5/18/2023: L5-S1 facet arthropathy  -MRI denied by insurance. Order CT L-spine to evaluate neuraxial pathology in context of over 1 decade of pain, pain severity (7/10), failure to respond to conservative treatment- including yrs of med mgmt & >6 w PT-, and interventional planning  -Gabapentin as above      Discussed procedure risks/benefits in detail with patient. Pt meets medical necessity for procedure due to failure of conservative measures. Reviewed procedural risks including bleeding, infection, nerve damage, paralysis. Also reviewed mitigating factors such as screening for infection/blood thinner use, sterile precautions, and image-guidance when applicable. All questions answered. Pt/guardian expressed understanding and  choose to proceed            Sofiya Mandujano MD  Anesthesiologist & Interventional Pain Physician   [facility]  O: [phone]  F: [phone]  2:01 PM  11/29/23

## 2023-12-01 ENCOUNTER — OFFICE VISIT (OUTPATIENT)
Dept: OBSTETRICS AND GYNECOLOGY | Facility: CLINIC | Age: 48
End: 2023-12-01
Payer: COMMERCIAL

## 2023-12-01 VITALS
HEIGHT: 60 IN | SYSTOLIC BLOOD PRESSURE: 124 MMHG | DIASTOLIC BLOOD PRESSURE: 68 MMHG | BODY MASS INDEX: 30.43 KG/M2 | WEIGHT: 155 LBS

## 2023-12-01 DIAGNOSIS — Z01.419 WELL WOMAN EXAM WITH ROUTINE GYNECOLOGICAL EXAM: Primary | ICD-10-CM

## 2023-12-01 DIAGNOSIS — N92.6 IRREGULAR MENSES: ICD-10-CM

## 2023-12-01 DIAGNOSIS — R45.86 MOOD SWINGS: ICD-10-CM

## 2023-12-01 DIAGNOSIS — Z12.31 ENCOUNTER FOR SCREENING MAMMOGRAM FOR MALIGNANT NEOPLASM OF BREAST: ICD-10-CM

## 2023-12-01 DIAGNOSIS — N95.1 PERIMENOPAUSE: ICD-10-CM

## 2023-12-01 PROCEDURE — 99396 PREV VISIT EST AGE 40-64: CPT | Performed by: OBSTETRICS & GYNECOLOGY

## 2023-12-01 PROCEDURE — 1036F TOBACCO NON-USER: CPT | Performed by: OBSTETRICS & GYNECOLOGY

## 2023-12-01 ASSESSMENT — LIFESTYLE VARIABLES
HOW OFTEN DO YOU HAVE A DRINK CONTAINING ALCOHOL: MONTHLY OR LESS
SKIP TO QUESTIONS 9-10: 1
HOW OFTEN DO YOU HAVE SIX OR MORE DRINKS ON ONE OCCASION: NEVER
AUDIT-C TOTAL SCORE: 1
HOW MANY STANDARD DRINKS CONTAINING ALCOHOL DO YOU HAVE ON A TYPICAL DAY: 1 OR 2

## 2023-12-01 ASSESSMENT — ENCOUNTER SYMPTOMS
WEAKNESS: 0
CHEST TIGHTNESS: 0
ACTIVITY CHANGE: 1
UNEXPECTED WEIGHT CHANGE: 0
DEPRESSION: 0
SHORTNESS OF BREATH: 0
HEADACHES: 0
COLOR CHANGE: 0
ABDOMINAL DISTENTION: 0
DIZZINESS: 0
DYSURIA: 0
DIFFICULTY URINATING: 0
OCCASIONAL FEELINGS OF UNSTEADINESS: 0
FATIGUE: 1
ABDOMINAL PAIN: 0
ADENOPATHY: 0
MYALGIAS: 1
JOINT SWELLING: 0
LOSS OF SENSATION IN FEET: 0

## 2023-12-01 ASSESSMENT — PATIENT HEALTH QUESTIONNAIRE - PHQ9
2. FEELING DOWN, DEPRESSED OR HOPELESS: NOT AT ALL
1. LITTLE INTEREST OR PLEASURE IN DOING THINGS: NOT AT ALL
SUM OF ALL RESPONSES TO PHQ9 QUESTIONS 1 AND 2: 0

## 2023-12-01 NOTE — PROGRESS NOTES
Annual  Subjective   Meghan Hernández is a 48 y.o. female who is here for a gyn exam and follow-up on menopausal concerns.       Last gyn visit 06/21/23 for perimenopausal irreg bleed and mood swings; at that time using lo-loestrin to relieve dub to good effect, and escitalopram for mood; due to chronic pain/polyathralgia, r/f to pcp vs rheum for poss fibromylagia; has been dxd w ankylosing spondylitis by rheum specialist Azem per exam/x rays; prescribed prednisone taper/w f/u 08/23.  Now on Cosentyx therapy/patient stressed due to cost of therapy.   Last pap smear date 07/14/2022-neg,hpv-neg, 09/03/2015 ascus hpv pos (not 16/18)  Abnormal pap smear she reports having abnormal paps after the birth of her daughter, but was never diagnoses with any problem.   Mamm 12/02/2022-NEG, 05/24/2022- DX studies LT breast NEGBirth control lo-loestrin.   Date of Last Period spots dark brown 2 d/mo.   Sexual activity not currently sexually active; updated 12/23  Other 07/22 FSH 67.5.   Total pregnancies  4.   Total live births  1, daughter=Stephanie.   Miscarriage(s)  1, 18 WEEK FETAL DEMISE.   Review of Systems   Constitutional:  Positive for activity change and fatigue. Negative for unexpected weight change.   Respiratory:  Negative for chest tightness and shortness of breath.    Cardiovascular:  Negative for chest pain and leg swelling.   Gastrointestinal:  Negative for abdominal distention and abdominal pain.   Genitourinary:  Negative for difficulty urinating, dysuria, genital sores, pelvic pain, vaginal bleeding, vaginal discharge and vaginal pain.   Musculoskeletal:  Positive for gait problem and myalgias. Negative for joint swelling.   Skin:  Negative for color change and rash.   Neurological:  Negative for dizziness, weakness and headaches.   Hematological:  Negative for adenopathy.   Objective  Visit Vitals  /68   Ht 1.524 m (5')   Wt 70.3 kg (155 lb)   LMP 11/10/2023   BMI 30.27 kg/m²   OB Status Having periods    Smoking Status Former   BSA 1.73 m²       General:   Alert and oriented, in no acute distress   Neck: Supple. No visible thyromegaly.    Breast/Axilla: Normal to palpation bilaterally without masses, skin changes, or nipple discharge.    Abdomen: Soft, non-tender, without masses or organomegaly   Vulva: Normal architecture without erythema, masses, or lesions.    Vagina: Normal mucosa without lesions, masses, or atrophy. No abnormal vaginal discharge.    Cervix: Normal without masses, lesions, or signs of cervicitis   Uterus: Normal, mobile, non-enlarged uterus   Adnexa: No palp masses or tenderness.   Pelvic Floor normal   Psych Normal affect. Normal mood.      Assessment/Plan   Encounter Diagnoses   Name Primary?    Well woman exam with routine gynecological exam; grossly normal breast and GYN exams Yes    Encounter for screening mammogram for malignant ; completed and negative 10/2/2023 neoplasm of breast     Irregular menses; resolved on low-dose OCPs     Perimenopause; vaginal symptoms improved on low-dose OCPs     Mood swings; improved on Cymbalta    Soheila Johnson MD

## 2023-12-04 ENCOUNTER — HOSPITAL ENCOUNTER (OUTPATIENT)
Dept: RADIOLOGY | Facility: HOSPITAL | Age: 48
Discharge: HOME | End: 2023-12-04
Payer: COMMERCIAL

## 2023-12-04 ENCOUNTER — APPOINTMENT (OUTPATIENT)
Dept: OBSTETRICS AND GYNECOLOGY | Facility: CLINIC | Age: 48
End: 2023-12-04
Payer: COMMERCIAL

## 2023-12-04 VITALS — HEIGHT: 60 IN | BODY MASS INDEX: 30.43 KG/M2 | WEIGHT: 155 LBS

## 2023-12-04 DIAGNOSIS — Z12.31 ENCOUNTER FOR SCREENING MAMMOGRAM FOR MALIGNANT NEOPLASM OF BREAST: ICD-10-CM

## 2023-12-04 PROCEDURE — 77063 BREAST TOMOSYNTHESIS BI: CPT

## 2023-12-05 ENCOUNTER — TREATMENT (OUTPATIENT)
Dept: PHYSICAL THERAPY | Facility: CLINIC | Age: 48
End: 2023-12-05
Payer: COMMERCIAL

## 2023-12-05 DIAGNOSIS — M54.17 LUMBOSACRAL RADICULITIS: ICD-10-CM

## 2023-12-05 DIAGNOSIS — M54.12 CERVICAL RADICULITIS: ICD-10-CM

## 2023-12-05 PROCEDURE — 97113 AQUATIC THERAPY/EXERCISES: CPT | Mod: GP

## 2023-12-05 ASSESSMENT — PAIN SCALES - GENERAL: PAINLEVEL_OUTOF10: 6

## 2023-12-05 NOTE — PROGRESS NOTES
Physical Therapy Treatment    Patient Name: Meghan Hernández  MRN: [id number]  Today's Date: 12/5/2023       Visit Number:  3/ 12   Visit Authorized:  12    Current Problem  1. Lumbosacral radiculitis  Follow Up In Physical Therapy      2. Cervical radiculitis  Follow Up In Physical Therapy          Subjective/General:  General Comment: Visit #3/12    The pt states that she was very sore for 2 days following her previous pool session.   Precautions  Precautions  Precautions Comment: Cx ankylosing spondylosis; Monitor Cx and lx pain/symptoms.  Modify activity as needed  Pain  Pain Score: 6  Pain Location:  (neck, back, knees)  Objective  Pt descends pool steps with step to pattern leading with the R LE and utilizes bilat UE support.     Treatments:  Aquatic Exercise  Aquatic Exercise Performed: Yes    Walk across pool for core control, balance and acclimation to water:  forward/backward/side step 3.5' depth x 3 laps each     3.5' depth at HR with UE support:  - Heel raise x 10 reps  - Toe raise x 10reps  - Hip SLR x 8 reps  - Hip ABD side step 8 reps  - HS curls x 8 reps     March across pool with noodle x3 laps    5' depth with pool noodle under arms   - Back float for muscle relaxation x3mins  - Decompression x3mins     At bench:  - Scapular retraction, palms up [id number]  - Row x 10 reps  - Bicep curls x10 reps  - IR/ER x10 reps  - Slow punch outs [id number]  - LAQ's x 10reps  - Marching  x 10 reps  - Bicycle  x 10 reps  - Ankle pumps x 20 reps    Fwd walking across pool x3laps     At steps:  - HS stretch 2 sec x 30 reps R/L LE's    Aquatics time: 39 mins    OP Education:  Outpatient Education  Individual(s) Educated: Patient  Education Comment: Reinforced education that soreness following the session is to be expected for first few sessions and to continue to report back on symptom response at upcoming sessions.    Assessment:  PT Assessment  Assessment Comment: D/t the pts significant increase in pain following last  "session that lasted 2 days, decreased number of reps of each activity and added deep water decompression to assist with pain and post-session soreness.    Pain end of session:  \"about the same\"    Plan:  OP PT Plan  Treatment/Interventions: Aquatic therapy, Education/ Instruction, Electrical stimulation, Manual therapy, Neuromuscular re-education, Therapeutic activities, Therapeutic exercises, Ultrasound  PT Plan: Skilled PT  PT Frequency: 2 times per week  Duration: 12 weeks  Onset Date: 09/26/23  Certification Period Start Date: 11/15/23  Certification Period End Date: 02/13/24  Number of Treatments Authorized: 12  Rehab Potential: Fair  Plan of Care Agreement: Patient  Continue with current POC/no changes    Assessment of current progress against goals:  Insufficient treatment time to assess progress  Goals:  Active       PT Problem - Cx / Lx       PT STG       Start:  11/15/23    Expected End:  12/30/23       1.  Improve Cx strength to Fair at deficits   2.  Improve LE AROM flexion by 10-20 degrees at deficits  3.  Improve LE strength by ½ mm grade at deficits  4.  Improve trunk strength to Fair + or better  5.  Improve sitting posture with correct alignment of Cx region and shoulders  6.  Pain:  4 to 6           PT STG Functional        Start:  11/15/23    Expected End:  12/30/23       1.  Pt able to sit for up to 30 minutes 60% of the time with min to no pain and popping  2.  Pt able to sit at work, hold her grand children and perform IADLs, 40% of the time with minimal pain         PT LTG       Start:  11/15/23    Expected End:  02/13/24       1.  Improve Cx strength to Fair+ / Good - at deficits   2.  Improve LE AROM flexion by 70 degrees   3.  Improve LE strength to 4/5  4.  Improve trunk strength to Good -  or better  5.  Improve sitting posture with correct alignment of Cx region and shoulders with sustained positions  6.  Pain:  3 to 4  7.  Functional Outcome Measure:  25         PT LTG functional       " Start:  11/15/23    Expected End:  02/13/24       1.  Pt able to sit for up to 45 minutes 75% of the time with min to no pain and popping  2.  Pt able to sit at work, hold her grand children and perform IADLs, 60% of the time with minimal pain to no pain         Patient Stated Goal 1       Start:  11/15/23    Expected End:  02/13/24       Neck:  Turn her head without pain and popping  Back:  Sit at work, hold her grandchildren, house activities without pain

## 2023-12-07 ENCOUNTER — TREATMENT (OUTPATIENT)
Dept: PHYSICAL THERAPY | Facility: CLINIC | Age: 48
End: 2023-12-07
Payer: COMMERCIAL

## 2023-12-07 DIAGNOSIS — M54.12 CERVICAL RADICULITIS: ICD-10-CM

## 2023-12-07 DIAGNOSIS — M54.17 LUMBOSACRAL RADICULITIS: ICD-10-CM

## 2023-12-07 PROCEDURE — 97113 AQUATIC THERAPY/EXERCISES: CPT | Mod: GP

## 2023-12-08 ENCOUNTER — TELEPHONE (OUTPATIENT)
Dept: PAIN MEDICINE | Facility: CLINIC | Age: 48
End: 2023-12-08
Payer: COMMERCIAL

## 2023-12-08 NOTE — TELEPHONE ENCOUNTER
Patient left a message to reschedule procedure on 12/21.     Called patient back and left a message asking for her to call the office to reschedule procedure.

## 2023-12-11 ENCOUNTER — APPOINTMENT (OUTPATIENT)
Dept: PHYSICAL THERAPY | Facility: CLINIC | Age: 48
End: 2023-12-11
Payer: COMMERCIAL

## 2023-12-13 ENCOUNTER — TREATMENT (OUTPATIENT)
Dept: PHYSICAL THERAPY | Facility: CLINIC | Age: 48
End: 2023-12-13
Payer: COMMERCIAL

## 2023-12-13 DIAGNOSIS — M54.12 CERVICAL RADICULITIS: ICD-10-CM

## 2023-12-13 DIAGNOSIS — M54.17 RADICULOPATHY, LUMBOSACRAL REGION: ICD-10-CM

## 2023-12-13 DIAGNOSIS — M54.17 LUMBOSACRAL RADICULITIS: Primary | ICD-10-CM

## 2023-12-13 DIAGNOSIS — M54.12 RADICULOPATHY, CERVICAL REGION: ICD-10-CM

## 2023-12-13 PROCEDURE — 97113 AQUATIC THERAPY/EXERCISES: CPT | Mod: GP | Performed by: PHYSICAL THERAPIST

## 2023-12-13 ASSESSMENT — PAIN SCALES - GENERAL: PAINLEVEL_OUTOF10: 5 - MODERATE PAIN

## 2023-12-13 ASSESSMENT — PAIN - FUNCTIONAL ASSESSMENT: PAIN_FUNCTIONAL_ASSESSMENT: 0-10

## 2023-12-13 NOTE — PROGRESS NOTES
Physical Therapy Treatment    Patient Name: Meghan Hernández  MRN: [id number]  Encounter date:  12/13/2023  Time Calculation  Start Time: 1605  Stop Time: 1642  Time Calculation (min): 37 min    Visit Number:  5 / 12   Visit Authorized:  12    Current Problem  1. Lumbosacral radiculitis  Follow Up In Physical Therapy      2. Cervical radiculitis  Follow Up In Physical Therapy      3. Radiculopathy, lumbosacral region        4. Radiculopathy, cervical region            Precautions  Precautions  Precautions Comment: Cx ankylosing spondylosis; Monitor Cx and lx pain/symptoms.  Modify activity as needed      Pain  Pain Assessment: 0-10  Pain Score: 5 - Moderate pain (Post treatment:  4.5)    Subjective  General  General Comment: muscles are getting strong. Pain about the same.  Injection last Thursday (CT scan of Lx is Monday)    PT  Visit  Response to Previous Treatment: Patient with no complaints from previous session.    Objective  Pt tends to lean on window ledge when standing    Aquatic Therapy:       Walk across pool for core control, balance:  forward/backward/side step 3.5' depth x 3 laps each     3.5' depth at HR with UE support:  - Heel raise x 10 reps  - Toe raise x 10reps  - Hip SLR x 8 reps  - Hip ABD side step 8 reps  - HS curls x 8 reps     March across pool with noodle x2 laps     5' depth with pool noodle under arms   - Decompression x3mins   - bicycle x1 min  -decompression x2 min  -hip abd x1 min  -Decompression x2 min  -Bicycle x 1 min  -Decompression x 2min     At bench:  - Scapular retraction, palms up [id number]  - Row x 10 reps  - Bicep curls x10 reps  - IR/ER x10 reps  - Slow punch outs [id number]  - LAQ's x 10reps  - Ankle pumps x 20 reps     Fwd walking across pool x2 laps   Billed Treatment Time:  Aquatic Exercise 40 min    Activity tolerance:  Pt did well today.  No reports of pinching while she is hanging.    Assessment:  PT Assessment  Assessment Comment: Decrease in reps appears to be favorable to  patient  Pt's response to treatment:  fair  Areas of improvements: Slight decrease in pain post session  Limitations/deficits:  multiple areas of pain, limited tolerance    Plan:     Continue with current POC/no changes    Assessment of current progress against goals:  Progressing toward functional goals    Goals:  Active       PT Problem - Cx / Lx       PT STG       Start:  11/15/23    Expected End:  12/30/23       1.  Improve Cx strength to Fair at deficits   2.  Improve LE AROM flexion by 10-20 degrees at deficits  3.  Improve LE strength by ½ mm grade at deficits  4.  Improve trunk strength to Fair + or better  5.  Improve sitting posture with correct alignment of Cx region and shoulders  6.  Pain:  4 to 6           PT STG Functional        Start:  11/15/23    Expected End:  12/30/23       1.  Pt able to sit for up to 30 minutes 60% of the time with min to no pain and popping  2.  Pt able to sit at work, hold her grand children and perform IADLs, 40% of the time with minimal pain         PT LTG       Start:  11/15/23    Expected End:  02/13/24       1.  Improve Cx strength to Fair+ / Good - at deficits   2.  Improve LE AROM flexion by 70 degrees   3.  Improve LE strength to 4/5  4.  Improve trunk strength to Good -  or better  5.  Improve sitting posture with correct alignment of Cx region and shoulders with sustained positions  6.  Pain:  3 to 4  7.  Functional Outcome Measure:  25         PT LTG functional       Start:  11/15/23    Expected End:  02/13/24       1.  Pt able to sit for up to 45 minutes 75% of the time with min to no pain and popping  2.  Pt able to sit at work, hold her grand children and perform IADLs, 60% of the time with minimal pain to no pain         Patient Stated Goal 1       Start:  11/15/23    Expected End:  02/13/24       Neck:  Turn her head without pain and popping  Back:  Sit at work, hold her grandchildren, house activities without pain

## 2023-12-18 ENCOUNTER — TREATMENT (OUTPATIENT)
Dept: PHYSICAL THERAPY | Facility: CLINIC | Age: 48
End: 2023-12-18
Payer: COMMERCIAL

## 2023-12-18 ENCOUNTER — HOSPITAL ENCOUNTER (OUTPATIENT)
Dept: RADIOLOGY | Facility: HOSPITAL | Age: 48
Discharge: HOME | End: 2023-12-18
Payer: COMMERCIAL

## 2023-12-18 DIAGNOSIS — M54.17 RADICULOPATHY, LUMBOSACRAL REGION: ICD-10-CM

## 2023-12-18 DIAGNOSIS — M54.17 LUMBOSACRAL RADICULITIS: Primary | ICD-10-CM

## 2023-12-18 DIAGNOSIS — M54.12 CERVICAL RADICULITIS: ICD-10-CM

## 2023-12-18 DIAGNOSIS — M54.12 RADICULOPATHY, CERVICAL REGION: ICD-10-CM

## 2023-12-18 PROCEDURE — 72131 CT LUMBAR SPINE W/O DYE: CPT

## 2023-12-18 PROCEDURE — 97113 AQUATIC THERAPY/EXERCISES: CPT | Mod: GP | Performed by: PHYSICAL THERAPIST

## 2023-12-18 ASSESSMENT — PAIN - FUNCTIONAL ASSESSMENT: PAIN_FUNCTIONAL_ASSESSMENT: 0-10

## 2023-12-18 ASSESSMENT — PAIN SCALES - GENERAL: PAINLEVEL_OUTOF10: 5 - MODERATE PAIN

## 2023-12-18 NOTE — PROGRESS NOTES
Physical Therapy Treatment    Patient Name: Meghan Hernández  MRN: [id number]  Encounter date:  12/18/2023  Time Calculation  Start Time: 1600  Stop Time: 1632  Time Calculation (min): 32 min     PT Therapeutic Procedures Time Entry  Aquatic Therapy Time Entry: 29    Visit Number:  6 (including evaluation)  Planned total visits: 12  Visit Authorized:  12    Current Problem  1. Lumbosacral radiculitis  Follow Up In Physical Therapy      2. Cervical radiculitis  Follow Up In Physical Therapy      3. Radiculopathy, lumbosacral region        4. Radiculopathy, cervical region            Precautions  Precautions  Precautions Comment: Cx ankylosing spondylosis; Monitor Cx and lx pain/symptoms.  Modify activity as needed      Pain  Pain Assessment: 0-10  Pain Score: 5 - Moderate pain (Post treatment:  5)    Subjective  General  General Comment: Pt remains sore and feels it is due to packing for the move. (She doesn't feel it is due to the exercises and feels the exercises do not need to be modified)    PT  Visit  Response to Previous Treatment: Patient with no complaints from previous session.    Objective  Pt continues to be slightly flexed forward.     Aquatic Therapy:       Walk across pool for core control, balance:  forward/backward/side step 3.5' depth x 3 laps each     3.5' depth at HR with UE support:  - Heel raise x 10 reps  - Toe raise x 10reps  - Hip SLR x 8 reps  - Hip ABD side step 8 reps  - HS curls x 8 reps     March across pool with noodle x2 laps     5' depth with pool noodle under arms   - Decompression x3mins   - bicycle x1 min  -decompression x2 min  -hip abd x1 min  -Decompression x2 min  -Bicycle x 1 min  -Decompression x 2min     At bench:  - Scapular retraction, palms up [id number]  - Row x 10 reps (with noodle)  - Bicep curls x10 reps  - IR/ER x10 reps  - Slow punch outs [id number]  - LAQ's x 10reps  - Ankle pumps x 20 reps     Fwd walking across pool x2 laps   Billed Treatment Time:  Aquatic Exercise 29  min    Activity tolerance:  Pt able to perform the exercises today without any worsening of her symptoms    Assessment:  PT Assessment  Assessment Comment: Pt's pace of exercises was good this date.  Her movements more fluid than last visit.  Pt's response to treatment:  No change in pain but able to perform exercises without modification  Areas of improvements:  strengthening and control  Limitations/deficits:  pain, weakness    Plan:     Continue with current POC/no changes    Assessment of current progress against goals:  Progressing toward functional goals    Goals:  Active       PT Problem - Cx / Lx       PT STG       Start:  11/15/23    Expected End:  12/30/23       1.  Improve Cx strength to Fair at deficits   2.  Improve LE AROM flexion by 10-20 degrees at deficits  3.  Improve LE strength by ½ mm grade at deficits  4.  Improve trunk strength to Fair + or better  5.  Improve sitting posture with correct alignment of Cx region and shoulders  6.  Pain:  4 to 6           PT STG Functional        Start:  11/15/23    Expected End:  12/30/23       1.  Pt able to sit for up to 30 minutes 60% of the time with min to no pain and popping  2.  Pt able to sit at work, hold her grand children and perform IADLs, 40% of the time with minimal pain         PT LTG       Start:  11/15/23    Expected End:  02/13/24       1.  Improve Cx strength to Fair+ / Good - at deficits   2.  Improve LE AROM flexion by 70 degrees   3.  Improve LE strength to 4/5  4.  Improve trunk strength to Good -  or better  5.  Improve sitting posture with correct alignment of Cx region and shoulders with sustained positions  6.  Pain:  3 to 4  7.  Functional Outcome Measure:  25         PT LTG functional       Start:  11/15/23    Expected End:  02/13/24       1.  Pt able to sit for up to 45 minutes 75% of the time with min to no pain and popping  2.  Pt able to sit at work, hold her grand children and perform IADLs, 60% of the time with minimal pain to  no pain         Patient Stated Goal 1       Start:  11/15/23    Expected End:  02/13/24       Neck:  Turn her head without pain and popping  Back:  Sit at work, hold her grandchildren, house activities without pain

## 2023-12-19 NOTE — PROGRESS NOTES
Physical Therapy Treatment    Patient Name: Meghan Hernández  MRN: [id number]  Encounter date:  12/20/2023             Visit Number:  Visit count could not be calculated. Make sure you are using a visit which is associated with an episode. (including evaluation)  Planned total visits: ***  Visit Authorized:  ***    Visit Number:  7 / 12   Visit Authorized:  ***    Current Problem  1. Lumbosacral radiculitis        2. Cervical radiculitis        3. Radiculopathy, lumbosacral region        4. Radiculopathy, cervical region              Precautions         Pain       Subjective  General            ***    Objective  ***    Aquatic Therapy:       Walk across pool for core control, balance:  forward/backward/side step 3.5' depth x 3 laps each     3.5' depth at HR with UE support:  - Heel raise x 10 reps  - Toe raise x 10reps  - Hip SLR x 8 reps  - Hip ABD side step 8 reps  - HS curls x 8 reps     March across pool with noodle x2 laps     5' depth with pool noodle under arms   - Decompression x3mins   - bicycle x1 min  -decompression x2 min  -hip abd x1 min  -Decompression x2 min  -Bicycle x 1 min  -Decompression x 2min     At bench:  - Scapular retraction, palms up [id number]  - Row x 10 reps (with noodle)  - Bicep curls x10 reps  - IR/ER x10 reps  - Slow punch outs [id number]  - LAQ's x 10reps  - Ankle pumps x 20 reps     Fwd walking across pool x2 laps   Billed Treatment Time:  {Treatement times:14362}    Activity tolerance:       OP EDUCATION:       Assessment:     Pt's response to treatment:  ***  Areas of improvements:  ***  Limitations/deficits:  ***    Pain end of session:  ***    Plan:     {BASPLAN:23251}    Assessment of current progress against goals:  {BASPTNOTEGOALASSESSMENT:73346}    OP Education:       Goals:

## 2023-12-20 ENCOUNTER — APPOINTMENT (OUTPATIENT)
Dept: PHYSICAL THERAPY | Facility: CLINIC | Age: 48
End: 2023-12-20
Payer: COMMERCIAL

## 2023-12-21 ENCOUNTER — APPOINTMENT (OUTPATIENT)
Dept: GASTROENTEROLOGY | Facility: HOSPITAL | Age: 48
End: 2023-12-21
Payer: COMMERCIAL

## 2023-12-22 NOTE — PROGRESS NOTES
Physical Therapy Treatment    Patient Name: Meghan Hernández  MRN: [id number]  Encounter date:  12/27/2023             Visit Number:  Visit count could not be calculated. Make sure you are using a visit which is associated with an episode. (including evaluation)  Planned total visits: ***  Visit Authorized:  ***    Visit Number:  7 / 12   Visit Authorized:  ***    Current Problem  1. Lumbosacral radiculitis        2. Cervical radiculitis        3. Radiculopathy, lumbosacral region        4. Radiculopathy, cervical region            Surgery  ***    Surgery date:  ***    Precautions         Pain       Subjective  General            ***    Objective  ***    Aquatic Therapy:       Walk across pool for core control, balance:  forward/backward/side step 3.5' depth x 3 laps each     3.5' depth at HR with UE support:  - Heel raise x 10 reps  - Toe raise x 10reps  - Hip SLR x 8 reps  - Hip ABD side step 8 reps  - HS curls x 8 reps     March across pool with noodle x2 laps     5' depth with pool noodle under arms   - Decompression x3mins   - bicycle x1 min  -decompression x2 min  -hip abd x1 min  -Decompression x2 min  -Bicycle x 1 min  -Decompression x 2min     At bench:  - Scapular retraction, palms up [id number]  - Row x 10 reps (with noodle)  - Bicep curls x10 reps  - IR/ER x10 reps  - Slow punch outs [id number]  - LAQ's x 10reps  - Ankle pumps x 20 reps     Fwd walking across pool x2 laps   Billed Treatment Time:  {Treatement times:52271}    Activity tolerance:       OP EDUCATION:       Assessment:     Pt's response to treatment:  ***  Areas of improvements:  ***  Limitations/deficits:  ***    Pain end of session:  ***    Plan:     {BASPLAN:62574}    Assessment of current progress against goals:  {BASPTNOTEGOALASSESSMENT:25154}    Goals:

## 2023-12-27 ENCOUNTER — APPOINTMENT (OUTPATIENT)
Dept: PHYSICAL THERAPY | Facility: CLINIC | Age: 48
End: 2023-12-27
Payer: COMMERCIAL

## 2023-12-28 ENCOUNTER — HOSPITAL ENCOUNTER (OUTPATIENT)
Dept: GASTROENTEROLOGY | Facility: HOSPITAL | Age: 48
Discharge: HOME | End: 2023-12-28
Payer: COMMERCIAL

## 2023-12-28 ENCOUNTER — HOSPITAL ENCOUNTER (OUTPATIENT)
Dept: RADIOLOGY | Facility: HOSPITAL | Age: 48
Discharge: HOME | End: 2023-12-28
Payer: COMMERCIAL

## 2023-12-28 VITALS
SYSTOLIC BLOOD PRESSURE: 142 MMHG | TEMPERATURE: 97 F | HEART RATE: 82 BPM | OXYGEN SATURATION: 99 % | HEIGHT: 60 IN | BODY MASS INDEX: 30.43 KG/M2 | RESPIRATION RATE: 17 BRPM | WEIGHT: 155 LBS | DIASTOLIC BLOOD PRESSURE: 53 MMHG

## 2023-12-28 DIAGNOSIS — M54.12 CERVICAL RADICULOPATHY: ICD-10-CM

## 2023-12-28 PROCEDURE — 76000 FLUOROSCOPY <1 HR PHYS/QHP: CPT

## 2023-12-28 PROCEDURE — 62321 NJX INTERLAMINAR CRV/THRC: CPT | Performed by: ANESTHESIOLOGY

## 2023-12-28 ASSESSMENT — PAIN DESCRIPTION - DESCRIPTORS
DESCRIPTORS: ACHING
DESCRIPTORS: THROBBING

## 2023-12-28 ASSESSMENT — COLUMBIA-SUICIDE SEVERITY RATING SCALE - C-SSRS
1. IN THE PAST MONTH, HAVE YOU WISHED YOU WERE DEAD OR WISHED YOU COULD GO TO SLEEP AND NOT WAKE UP?: NO
2. HAVE YOU ACTUALLY HAD ANY THOUGHTS OF KILLING YOURSELF?: NO
6. HAVE YOU EVER DONE ANYTHING, STARTED TO DO ANYTHING, OR PREPARED TO DO ANYTHING TO END YOUR LIFE?: NO

## 2023-12-28 ASSESSMENT — PAIN SCALES - GENERAL
PAINLEVEL_OUTOF10: 5 - MODERATE PAIN
PAINLEVEL_OUTOF10: 2

## 2023-12-28 ASSESSMENT — PAIN - FUNCTIONAL ASSESSMENT
PAIN_FUNCTIONAL_ASSESSMENT: 0-10
PAIN_FUNCTIONAL_ASSESSMENT: 0-10

## 2023-12-28 NOTE — INTERVAL H&P NOTE
H&P reviewed. The patient was examined and there are no changes to the H&P. Meghan Hernández is a 48 y.o. female with PMH MDD, RA, ankylosing spondylitis  who presents for C7-T1 GINA. Patient's pain stable and persistent from last visit.  No personal/family hx issues with anesthesia. Denies allergies to Latex, steroids, local anesthetics, or iodine/contrast. Denies being on blood thinners. Not diabetic.  Denies fever, chills, NS, CP, SOB, cough, N/V.    Discussed procedure risks/benefits in detail with patient. Pt meets medical necessity for procedure due to failure of conservative measures. Reviewed procedural risks including bleeding, infection, nerve damage, paralysis. Also reviewed mitigating factors such as screening for infection/blood thinner use, sterile precautions, and image-guidance when applicable. All questions answered. Pt/guardian expressed understanding and choose to proceed      Sofiya Mandujano MD  Anesthesiologist & Interventional Pain Physician   [facility]  O: [phone]  F: [phone]  1:14 PM  12/28/23

## 2023-12-28 NOTE — DISCHARGE INSTRUCTIONS
DISCHARGEINSTRUCTIONS FOR INJECTIONS     You underwent a cervical epidural steroid injection today    Aftermost injections, it is recommended that you relax and limit your activity for the remainder of the day unless you have been told otherwise by your pain physician.  You should not drive a car, operate machinery, or make important legal decisions unless otherwise directed by your pain physician.  You may resume your normal activity, including exercise, tomorrow.      Keep a written pain diary of how much pain relief you experienced following the injection procedure and the length of time of pain relief you experienced pain relief. Following diagnostic injections like medial branch nerve blocks, sacroiliac joint blocks, stellate ganglion injections and other blocks, it is very important you record the specific amount of pain relief you experienced immediately after the injectionand how long it lasted. Your doctor will ask you for this information at your follow up visit.     For all injections, please keep the injection site dry and inspect the site for a couple of days. You may remove the Band-Aid the day of the injection at any time.     Some discomfort, bruising or slight swelling may occur at the injection site. This is not abnormal if it occurs.  If needed you may:    -Take over the counter medication such as Tylenol or Motrin.   -Apply an ice pack for 30 minutes, 2 to 3 times a day for the first 24 hours.     You may shower today; no soaking baths, hot tubs, whirlpools or swimming pools for two days.      If you are given steroids in your injection, it may take 3-5 days for the steroid medication to take effect. You may notice a worsening of your symptoms for 1-2 days after the injection. This is not abnormal.  You may use acetaminophen, ibuprofen, or prescription medication that your doctor may have prescribed for you if you need to do so.     A few common side effects of steroids include facial flushing,  sweating, restlessness, irritability,difficulty sleeping, increase in blood sugar, and increased blood pressure. If you have diabetes, please monitor your blood sugar at least once a day for at least 5 days. If you have poorly controlled high blood pressure, monitoryour blood pressure for at least 2 days and contact your primary care physician if these numbers are unusually high for you.      If you take aspirin or non-steroidal anti-inflammatory drugs (examples are Motrin, Advil, ibuprofen, Naprosyn, Voltaren, Relafen, etc.) you may restart these this evening, but stop taking it 3 days before your next appointment, unless instructed otherwiseby your physician.      You do not need to discontinue non-aspirin-containing pain medications prior to an injection (examples: Celebrex, tramadol, hydrocodone and acetaminophen).      If you take a blood thinning medication (Coumadin, Lovenox, Fragmin,Ticlid, Plavix, Pradaxa, etc.), please discuss this with your primary care physician/cardiologist and your pain physician. These medications MUST be discontinued before you can have an injection safely, without the risk of uncontrolled bleeding. If these medications are not discontinued for an appropriate period of time, you will not be able to receivean injection.      If you are taking Coumadin, please have your INR checked the morning of your procedure and bringthe result to your appointment unless otherwise instructed. If your INR is over 1.2, your injection may need to be rescheduled to avoid uncontrolled bleeding from the needle placement.     Call [facility] at [phone] between 8am-4pm Monday - Friday if you are experiencing the following:    If you received an epidural or spinal injection:    -Headache that doesnot go away with medicine, is worse when sitting or standing up, and is greatly relieved upon lying down.   -Severe pain worse than or different than your baseline pain.   -Chills or fever  (101º F or greater).   -Drainage or signs of infection at the injection site     Go directly to the Emergency Department if you are experiencing the following and received an epidural or spinal injection:   -Abrupt weakness or progressive weakness in your legs that starts after you leave the clinic.   -Abrupt severe or worsening numbness in your legs.   -Inability to urinate after the injection or loss of bowel or bladder control without the urge to defecate or urinate.     If you have a clinical question that cannot wait until your next appointment, please call [phone] between 8am-4pm Monday - Friday or send a [employer] message. We do our best to return all non-emergency messages within 24 hours, Monday - Friday. A nurse or physician will return your message.      If you need to cancel an appointment, please call the scheduling staff at [phone] during normal business hours or leave a message at least 24 hours in advance.     If you are going to be sedated for your next procedure, you MUST have responsible adult who can legally drive accompany you home. You cannot eat or drink for eight hours prior to the planned procedure if you are going to receive sedation. You may take your non-blood thinning medications with a small sip of water.

## 2023-12-28 NOTE — OP NOTE
Cervical Epidural Procedure Note    Procedure: The risks and benefits of treatment options and alternatives were discussed with the patient, and consent was obtained for a cervical epidural steroid injection. She wishes to proceed. She was placed in a prone position. With fluoroscopic assistance, the C7-T1 interspace was identified. After prep with chlorhexidine and 1% lidocaine local infiltration, an 18- gauge Touhy needle was inserted coaxially. Using cautious loss of resistance to saline technique, firm loss of resistance was appreciated at 5.5 cm. Aspiration revealed no cerebrospinal fluid, blood, or air.     3 cc of Omnipaque contrast was injected which revealed spread within the epidural space and a normal bilateral epidurogram on fluoroscopy. This was confirmed with AP and lateral views. After negative aspiration, 80 mg triamcinolone + 2 ml NS was easily injected for total of 4 ml injectate.    Pt monitored with pulse ox, NIBP. See nursing flowsheet for VS record (and sedation record if applicable)    There were no complications, and the patient tolerated the procedure well. There was no numbness or weakness at the time of discharge. She was instructed to await the effects of the steroid over the next several days, to continue with physical therapy as tolerated, and follow up with me 2 weeks.      Sofiya Mandujano MD  Interventional Pain Physician  [facility]  3:40 PM  12/28/23

## 2024-01-03 ENCOUNTER — OFFICE VISIT (OUTPATIENT)
Dept: PAIN MEDICINE | Facility: CLINIC | Age: 49
End: 2024-01-03
Payer: COMMERCIAL

## 2024-01-03 VITALS
SYSTOLIC BLOOD PRESSURE: 144 MMHG | RESPIRATION RATE: 18 BRPM | HEART RATE: 72 BPM | HEIGHT: 60 IN | DIASTOLIC BLOOD PRESSURE: 86 MMHG | WEIGHT: 155 LBS | BODY MASS INDEX: 30.43 KG/M2

## 2024-01-03 DIAGNOSIS — M79.7 FIBROMYALGIA: Primary | ICD-10-CM

## 2024-01-03 DIAGNOSIS — M46.1 SACROILIITIS, NOT ELSEWHERE CLASSIFIED (CMS-HCC): ICD-10-CM

## 2024-01-03 DIAGNOSIS — M54.12 CERVICAL RADICULOPATHY: ICD-10-CM

## 2024-01-03 PROCEDURE — 99214 OFFICE O/P EST MOD 30 MIN: CPT | Performed by: ANESTHESIOLOGY

## 2024-01-03 PROCEDURE — 1036F TOBACCO NON-USER: CPT | Performed by: ANESTHESIOLOGY

## 2024-01-03 RX ORDER — METHOCARBAMOL 750 MG/1
750 TABLET, FILM COATED ORAL 4 TIMES DAILY PRN
Qty: 360 TABLET | Refills: 2 | Status: SHIPPED | OUTPATIENT
Start: 2024-01-03 | End: 2024-09-29

## 2024-01-03 RX ORDER — PREGABALIN 75 MG/1
75 CAPSULE ORAL 2 TIMES DAILY
Qty: 60 CAPSULE | Refills: 0 | Status: SHIPPED | OUTPATIENT
Start: 2024-01-03 | End: 2024-02-05

## 2024-01-03 ASSESSMENT — ENCOUNTER SYMPTOMS
CARDIOVASCULAR NEGATIVE: 1
ENDOCRINE NEGATIVE: 1
GASTROINTESTINAL NEGATIVE: 1
HEMATOLOGIC/LYMPHATIC NEGATIVE: 1
WEAKNESS: 1
NECK PAIN: 1
PSYCHIATRIC NEGATIVE: 1
NUMBNESS: 1
RESPIRATORY NEGATIVE: 1
CONSTITUTIONAL NEGATIVE: 1
BACK PAIN: 1
EYES NEGATIVE: 1

## 2024-01-03 ASSESSMENT — PATIENT HEALTH QUESTIONNAIRE - PHQ9
SUM OF ALL RESPONSES TO PHQ9 QUESTIONS 1 AND 2: 0
1. LITTLE INTEREST OR PLEASURE IN DOING THINGS: NOT AT ALL
2. FEELING DOWN, DEPRESSED OR HOPELESS: NOT AT ALL

## 2024-01-03 ASSESSMENT — PAIN - FUNCTIONAL ASSESSMENT: PAIN_FUNCTIONAL_ASSESSMENT: 0-10

## 2024-01-03 ASSESSMENT — PAIN SCALES - GENERAL
PAINLEVEL: 3
PAINLEVEL_OUTOF10: 3

## 2024-01-03 NOTE — PROGRESS NOTES
PAIN MANAGEMENT FOLLOW-UP OFFICE NOTE    Date of Service: 1/3/2024    SUBJECTIVE    CHIEF COMPLAINT: neck pain    HISTORY OF PRESENT ILLNESS    Meghan Hernández is a 48 y.o. female with PMH MDD, RA, ankylosing spondylitis who presents for follow-up neck pain.     On 12/28, pt underwent C7-T1 GINA with 60% ongoing relief. Since that time, pt complains of diffuse muscle aches as well as her worst pain in her R low back. This pain radiates to her R thigh. Pt wishes to review CT L-spine to discuss how that correlates.    Pt denies new-onset numbness, weakness, bowel/bladder incontinence.  Pt denies recent infection, allergy to Latex/iodine/contrast. Patient is currently taking the following blood thinner(s): N/A    Procedure Log:  C7-T1 GINA 12/28/23: 80% ongoing relief  C7-T1 GINA 10/26/23: 100% relief RUE, 40% neck    REVIEW OF SYSTEMS  Review of Systems   Constitutional: Negative.    HENT: Negative.     Eyes: Negative.    Respiratory: Negative.     Cardiovascular: Negative.    Gastrointestinal: Negative.    Endocrine: Negative.    Musculoskeletal:  Positive for back pain and neck pain.   Skin: Negative.    Neurological:  Positive for weakness and numbness.   Hematological: Negative.    Psychiatric/Behavioral: Negative.         PAST MEDICAL HISTORY  Past Medical History:   Diagnosis Date    AS (ankylosing spondylitis) (CMS/HCC)     Rheumatoid arthritis (CMS/HCC)      Past Surgical History:   Procedure Laterality Date    COLONOSCOPY      DILATION AND CURETTAGE OF UTERUS      ENDOSCOPY PROCEDURE NOT PERFORMED       Family History   Problem Relation Name Age of Onset    Thyroid disease Mother      Colon cancer Father      Thyroid disease Sister      Cerebral palsy Brother          1/2 brother    Stomach cancer Maternal Grandmother      Breast cancer Other AUNT     Heart attack Other UNCLE        CURRENT MEDICATIONS  Current Outpatient Medications   Medication Sig Dispense Refill    acetaminophen (TylenoL) 325 mg tablet Take 2  tablets (650 mg) by mouth every 6 hours if needed for mild pain (1 - 3).      cyanocobalamin (Vitamin B-12) 100 mcg tablet Take by mouth. As directed      DULoxetine (Cymbalta) 60 mg DR capsule Take 1 capsule (60 mg) by mouth once daily. 90 capsule 1    gabapentin (Neurontin) 600 mg tablet Take 1 tablet (600 mg) by mouth 3 times a day. 90 tablet 1    ibuprofen 600 mg tablet Take 1 tablet (600 mg) by mouth every 6 hours if needed for mild pain (1 - 3). FOR PATIENT RECEIVING KETOROLAC.  DO NO GIVE IBUPROFEN FOR 6 HRS FOLLOWING KETOROLAC ADMIN.  NOT TO EXCEED 5 TABLES IN 24 HOURS      methocarbamol (Robaxin) 750 mg tablet Take 1 tablet (750 mg) by mouth 4 times a day. 360 tablet 1    norethindrone-e.estradioL-iron (Lo Loestrin) 1 mg-10 mcg (24)/10 mcg (2) tablet Take 1 tablet by mouth once daily. 84 tablet 3    omeprazole (PriLOSEC) 40 mg DR capsule Take 1 capsule (40 mg) by mouth once daily.      prenatal vit no.124/iron/folic (PRENATAL VITAMIN ORAL)       secukinumab (Cosentyx) 150 mg/mL syringe 1 mL (150 mg) 1 time. monthly      magnesium oxide (Mag-Ox) 200 mg magnesium tablet Take 2 tablets (400 mg) by mouth once daily. WITH A MEAL       No current facility-administered medications for this visit.       ALLERGIES AND DRUG REACTIONS  No Known Allergies       OBJECTIVE  Visit Vitals  /86   Pulse 72   Resp 18   Ht 1.524 m (5')   Wt 70.3 kg (155 lb)   LMP 07/01/2023 (Approximate) Comment: signed declination form   BMI 30.27 kg/m²   OB Status Postmenopausal   Smoking Status Former   BSA 1.73 m²       Last Recorded Pain Score (if available):                Physical Exam  Vitals and nursing note reviewed.     General: Sitting in chair, NAD  Head: NCAT  Eyes: Sclera/conjunctiva clear, EOMI, PERRL  Nose/mouth: MMM  CV: Good distal pulses  Lungs: Good/equal chest excursion  Abdomen: Soft, ND  Ext: No cyanosis/edema  MSK: C-spine alignment: ant tilt,  ROM: Pain on extension/looking left/lateral flexion left, cervical  paraspinal m NTTP  L-spine: alignment WNL, +R Benny finger, R PSIS TTP, BL paraspinal m NTTP. +R GOMEZ, Gaenslen, thigh thrust    Neuro: AAOx3   Dermatome sensation to light touch  LEFT C5: WNL    RIGHT C5: WNL      LEFT C6: WNL       RIGHT C6: WNL      LEFT C7: WNL       RIGHT C7: WNL      LEFT C8: WNL       RIGHT C8: WNL      LEFT T1: WNL       RIGHT T1: WNL    LEFT L1 (lower pelvis/upper thigh): WNL    RIGHT L1: WNL      LEFT L2 (upper thigh): WNL       RIGHT: L2:WNL      LEFT L3 (medial knee): WNL       RIGHT L3: WNL      LEFT L4 (superior medial malleolus): WNL       RIGHT L4: WNL      LEFT L5 (dorsal foot): WNL       RIGHT L5: WNL      LEFT S1 (lateral foot): WNL     RIGHT S1: WNL      LEFT S2 (popliteal fossa): WNL    RIGHT S2: WNL        Motor strength  LEFT C5 (elbow flexion): 5/5   RIGHT C5: 5/5  LEFT C6 (wrist extension): 5/5     RIGHT C6: 5/5  LEFT C7 (elbow extension): 5/5     RIGHT C7: 5/5  LEFT C8 (finger abduction): 5/5     RIGHT C8: 5/5  LEFT T1 (hand ): 5/5     RIGHT T1: 5/5    LEFT L2 (hip flexion): 5/5   RIGHT L2: 5/5          LEFT L3 (knee extension): 4/5     RIGHT L3: 4/5          LEFT L4 (dorsiflexion): 5/5     RIGHT L4: 5/5          LEFT L5 (EHL extension): 5/5     RIGHT L5: 5/5          LEFT S1 (plantarflexion): 5/5     RIGHT S1: 5/5          LEFT S2 (knee flexion): 4/5      RIGHT S2: 4/5    Special testing  Forde: neg BL  DTR diminished patellar reflexes  Seated slump test neg BL  Clonus: neg BL  Babinski: neg BL    Psych: affect nl  Skin: no rash/lesions      REVIEW OF LABORATORY DATA  I have reviewed the following lab results:  WBC   Date Value Ref Range Status   09/07/2021 6.5 4.5 - 11.0 K/UL Final     RBC   Date Value Ref Range Status   09/07/2021 4.48 4.0 - 4.9 M/UL Final     Hemoglobin   Date Value Ref Range Status   09/07/2021 13.1 12.0 - 15.0 GM/DL Final     Hematocrit   Date Value Ref Range Status   09/07/2021 41.5 36 - 44 % Final     MCV   Date Value Ref Range Status  "  09/07/2021 92.6 80 - 100 FL Final     MCH   Date Value Ref Range Status   09/07/2021 29.2 26 - 34 PG Final     MCHC   Date Value Ref Range Status   09/07/2021 31.6 31 - 37 % Final     Platelets   Date Value Ref Range Status   09/07/2021 246 150 - 450 K/UL Final     MPV   Date Value Ref Range Status   09/07/2021 11.0 7.0 - 12.6 CU Final     Sodium   Date Value Ref Range Status   09/07/2021 140 133 - 145 MMOL/L Final     Potassium   Date Value Ref Range Status   09/07/2021 4.2 3.4 - 5.1 MMOL/L Final     Bicarbonate   Date Value Ref Range Status   09/07/2021 24 24 - 31 MMOL/L Final     Urea Nitrogen   Date Value Ref Range Status   09/07/2021 4 (L) 8 - 25 MG/DL Final     Calcium   Date Value Ref Range Status   09/07/2021 9.4 8.5 - 10.4 MG/DL Final     No results found for: \"PROTIME\", \"PTT\", \"INR\", \"FIBRINOGEN\"      REVIEW OF RADIOLOGY   I have reviewed the following:  Radiology Studies           MRI c-spine 8/30/23:  There is loss of the normal cervical lordosis. No acute fracture is  identified. There is grade 1 anterolisthesis C4 on C5. There are posterior  osteophytes extending from the C3-C6 levels. No  STIR abnormalities are seen  within the marrow.     The visualized cord signal is grossly unremarkable.     C2-C3: No disc herniation. No significant canal or foraminal stenosis.  C3-C4: There is a posterior disc osteophyte complex. No significant canal or  foraminal stenosis.  C4-C5: There is a posterior disc osteophyte complex. No significant canal or  foraminal stenosis.  C5-C6: There is a posterior disc osteophyte complex. There is facet  osteoarthropathy. No significant canal stenosis. Mild right neural foraminal  narrowing.  C6-C7: There is a posterior disc osteophyte complex. There is facet  osteoarthropathy. No significant canal stenosis. Mild right neural foraminal  narrowing.  C7-T1: No disc herniation. No significant canal or foraminal stenosis.     The paravertebral soft tissues are unremarkable.   "   IMPRESSION:  Mild multilevel cervical spondylosis most severe at C5-C6 and C6-C7  minimally progressed as compared to the prior examination from 2014.            ASSESSMENT & PLAN  Meghan Hernández is a 48 y.o. old female with PMH MDD, RA, ankylosing spondylitis who presents for F/U neck pain    1) Cervical radic  -Neck pain since 2021 radiating to right upper extremity with subjective fourth and fifth digit numbness, tingling, weakness. Possible contribution from rheumatoid arthritis, ankylosing spondylitis  -Refractive to Tylenol, NSAIDs, muscle relaxants, Cymbalta, topical lidocaine, >6 w PT  -MRI C-spine 8/30/2023: Multilevel spondylosis featuring grade 1 C4-5 listhesis, severe facet arthropathy at C5-6 and C6-7 with right neuroforaminal stenosis  -C7-T1 GINA 12/28/23: 80% ongoing relief     2) LBP  -Low back pain since prior to 2008 with right leg pain, numbness, weakness. There is appreciable weakness in bilateral lower extremities possibly related to deconditioning rather than radiculopathy. Pain, however, may be related to ankylosing spondylitis  -Refractive to Tylenol, NSAIDs, muscle relaxants, Cymbalta, topical lidocaine, chiropractics, >6 w PT. Cannot tolerate return to PT due to pain  -x-ray lumbar spine 5/18/2023: L5-S1 facet arthropathy  -Reviewed/discussed CT L-spine 12/18/23: mild disc bulging at L4-5, L5-S1 with mild R NFS at L4-5  -Numerous R SIJ-provocative maneuvers on exam. Schedule diagnostic/therapeutic R SIJ CSI    3) Fibromyalgia  -Diffuse whole body pain for yrs on hx RA and ankylosing spondylitis on duloxetine  -Refractive to Tylenol, NSAIDs, muscle relaxants, Cymbalta, topical lidocaine, chiropractics, >6 w PT  -DC gabapentin. Start pregabalin 75 mg BID  -Refill methocarbamol 750 mg QID PRN      Discussed procedure risks/benefits in detail with patient. Pt meets medical necessity for procedure due to failure of conservative measures. Reviewed procedural risks including bleeding, infection,  nerve damage, paralysis. Also reviewed mitigating factors such as screening for infection/blood thinner use, sterile precautions, and image-guidance when applicable. All questions answered. Pt/guardian expressed understanding and choose to proceed            Sofiya Mandujano MD  Anesthesiologist & Interventional Pain Physician   [facility]  O: [phone]  F: [phone]  2:25 PM  01/03/24

## 2024-01-03 NOTE — H&P (VIEW-ONLY)
PAIN MANAGEMENT FOLLOW-UP OFFICE NOTE    Date of Service: 1/3/2024    SUBJECTIVE    CHIEF COMPLAINT: neck pain    HISTORY OF PRESENT ILLNESS    Meghan Hernández is a 48 y.o. female with PMH MDD, RA, ankylosing spondylitis who presents for follow-up neck pain.     On 12/28, pt underwent C7-T1 GINA with 60% ongoing relief. Since that time, pt complains of diffuse muscle aches as well as her worst pain in her R low back. This pain radiates to her R thigh. Pt wishes to review CT L-spine to discuss how that correlates.    Pt denies new-onset numbness, weakness, bowel/bladder incontinence.  Pt denies recent infection, allergy to Latex/iodine/contrast. Patient is currently taking the following blood thinner(s): N/A    Procedure Log:  C7-T1 GINA 12/28/23: 80% ongoing relief  C7-T1 GINA 10/26/23: 100% relief RUE, 40% neck    REVIEW OF SYSTEMS  Review of Systems   Constitutional: Negative.    HENT: Negative.     Eyes: Negative.    Respiratory: Negative.     Cardiovascular: Negative.    Gastrointestinal: Negative.    Endocrine: Negative.    Musculoskeletal:  Positive for back pain and neck pain.   Skin: Negative.    Neurological:  Positive for weakness and numbness.   Hematological: Negative.    Psychiatric/Behavioral: Negative.         PAST MEDICAL HISTORY  Past Medical History:   Diagnosis Date    AS (ankylosing spondylitis) (CMS/HCC)     Rheumatoid arthritis (CMS/HCC)      Past Surgical History:   Procedure Laterality Date    COLONOSCOPY      DILATION AND CURETTAGE OF UTERUS      ENDOSCOPY PROCEDURE NOT PERFORMED       Family History   Problem Relation Name Age of Onset    Thyroid disease Mother      Colon cancer Father      Thyroid disease Sister      Cerebral palsy Brother          1/2 brother    Stomach cancer Maternal Grandmother      Breast cancer Other AUNT     Heart attack Other UNCLE        CURRENT MEDICATIONS  Current Outpatient Medications   Medication Sig Dispense Refill    acetaminophen (TylenoL) 325 mg tablet Take 2  tablets (650 mg) by mouth every 6 hours if needed for mild pain (1 - 3).      cyanocobalamin (Vitamin B-12) 100 mcg tablet Take by mouth. As directed      DULoxetine (Cymbalta) 60 mg DR capsule Take 1 capsule (60 mg) by mouth once daily. 90 capsule 1    gabapentin (Neurontin) 600 mg tablet Take 1 tablet (600 mg) by mouth 3 times a day. 90 tablet 1    ibuprofen 600 mg tablet Take 1 tablet (600 mg) by mouth every 6 hours if needed for mild pain (1 - 3). FOR PATIENT RECEIVING KETOROLAC.  DO NO GIVE IBUPROFEN FOR 6 HRS FOLLOWING KETOROLAC ADMIN.  NOT TO EXCEED 5 TABLES IN 24 HOURS      methocarbamol (Robaxin) 750 mg tablet Take 1 tablet (750 mg) by mouth 4 times a day. 360 tablet 1    norethindrone-e.estradioL-iron (Lo Loestrin) 1 mg-10 mcg (24)/10 mcg (2) tablet Take 1 tablet by mouth once daily. 84 tablet 3    omeprazole (PriLOSEC) 40 mg DR capsule Take 1 capsule (40 mg) by mouth once daily.      prenatal vit no.124/iron/folic (PRENATAL VITAMIN ORAL)       secukinumab (Cosentyx) 150 mg/mL syringe 1 mL (150 mg) 1 time. monthly      magnesium oxide (Mag-Ox) 200 mg magnesium tablet Take 2 tablets (400 mg) by mouth once daily. WITH A MEAL       No current facility-administered medications for this visit.       ALLERGIES AND DRUG REACTIONS  No Known Allergies       OBJECTIVE  Visit Vitals  /86   Pulse 72   Resp 18   Ht 1.524 m (5')   Wt 70.3 kg (155 lb)   LMP 07/01/2023 (Approximate) Comment: signed declination form   BMI 30.27 kg/m²   OB Status Postmenopausal   Smoking Status Former   BSA 1.73 m²       Last Recorded Pain Score (if available):                Physical Exam  Vitals and nursing note reviewed.     General: Sitting in chair, NAD  Head: NCAT  Eyes: Sclera/conjunctiva clear, EOMI, PERRL  Nose/mouth: MMM  CV: Good distal pulses  Lungs: Good/equal chest excursion  Abdomen: Soft, ND  Ext: No cyanosis/edema  MSK: C-spine alignment: ant tilt,  ROM: Pain on extension/looking left/lateral flexion left, cervical  paraspinal m NTTP  L-spine: alignment WNL, +R Benny finger, R PSIS TTP, BL paraspinal m NTTP. +R GOMEZ, [place]len, thigh thrust    Neuro: AAOx3   Dermatome sensation to light touch  LEFT C5: WNL    RIGHT C5: WNL      LEFT C6: WNL       RIGHT C6: WNL      LEFT C7: WNL       RIGHT C7: WNL      LEFT C8: WNL       RIGHT C8: WNL      LEFT T1: WNL       RIGHT T1: WNL    LEFT L1 (lower pelvis/upper thigh): WNL    RIGHT L1: WNL      LEFT L2 (upper thigh): WNL       RIGHT: L2:WNL      LEFT L3 (medial knee): WNL       RIGHT L3: WNL      LEFT L4 (superior medial malleolus): WNL       RIGHT L4: WNL      LEFT L5 (dorsal foot): WNL       RIGHT L5: WNL      LEFT S1 (lateral foot): WNL     RIGHT S1: WNL      LEFT S2 (popliteal fossa): WNL    RIGHT S2: WNL        Motor strength  LEFT C5 (elbow flexion): 5/5   RIGHT C5: 5/5  LEFT C6 (wrist extension): 5/5     RIGHT C6: 5/5  LEFT C7 (elbow extension): 5/5     RIGHT C7: 5/5  LEFT C8 (finger abduction): 5/5     RIGHT C8: 5/5  LEFT T1 (hand ): 5/5     RIGHT T1: 5/5    LEFT L2 (hip flexion): 5/5   RIGHT L2: 5/5          LEFT L3 (knee extension): 4/5     RIGHT L3: 4/5          LEFT L4 (dorsiflexion): 5/5     RIGHT L4: 5/5          LEFT L5 (EHL extension): 5/5     RIGHT L5: 5/5          LEFT S1 (plantarflexion): 5/5     RIGHT S1: 5/5          LEFT S2 (knee flexion): 4/5      RIGHT S2: 4/5    Special testing  Forde: neg BL  DTR diminished patellar reflexes  Seated slump test neg BL  Clonus: neg BL  Babinski: neg BL    Psych: affect nl  Skin: no rash/lesions      REVIEW OF LABORATORY DATA  I have reviewed the following lab results:  WBC   Date Value Ref Range Status   09/07/2021 6.5 4.5 - 11.0 K/UL Final     RBC   Date Value Ref Range Status   09/07/2021 4.48 4.0 - 4.9 M/UL Final     Hemoglobin   Date Value Ref Range Status   09/07/2021 13.1 12.0 - 15.0 GM/DL Final     Hematocrit   Date Value Ref Range Status   09/07/2021 41.5 36 - 44 % Final     MCV   Date Value Ref Range Status  "  09/07/2021 92.6 80 - 100 FL Final     MCH   Date Value Ref Range Status   09/07/2021 29.2 26 - 34 PG Final     MCHC   Date Value Ref Range Status   09/07/2021 31.6 31 - 37 % Final     Platelets   Date Value Ref Range Status   09/07/2021 246 150 - 450 K/UL Final     MPV   Date Value Ref Range Status   09/07/2021 11.0 7.0 - 12.6 CU Final     Sodium   Date Value Ref Range Status   09/07/2021 140 133 - 145 MMOL/L Final     Potassium   Date Value Ref Range Status   09/07/2021 4.2 3.4 - 5.1 MMOL/L Final     Bicarbonate   Date Value Ref Range Status   09/07/2021 24 24 - 31 MMOL/L Final     Urea Nitrogen   Date Value Ref Range Status   09/07/2021 4 (L) 8 - 25 MG/DL Final     Calcium   Date Value Ref Range Status   09/07/2021 9.4 8.5 - 10.4 MG/DL Final     No results found for: \"PROTIME\", \"PTT\", \"INR\", \"FIBRINOGEN\"      REVIEW OF RADIOLOGY   I have reviewed the following:  Radiology Studies           MRI c-spine 8/30/23:  There is loss of the normal cervical lordosis. No acute fracture is  identified. There is grade 1 anterolisthesis C4 on C5. There are posterior  osteophytes extending from the C3-C6 levels. No  STIR abnormalities are seen  within the marrow.     The visualized cord signal is grossly unremarkable.     C2-C3: No disc herniation. No significant canal or foraminal stenosis.  C3-C4: There is a posterior disc osteophyte complex. No significant canal or  foraminal stenosis.  C4-C5: There is a posterior disc osteophyte complex. No significant canal or  foraminal stenosis.  C5-C6: There is a posterior disc osteophyte complex. There is facet  osteoarthropathy. No significant canal stenosis. Mild right neural foraminal  narrowing.  C6-C7: There is a posterior disc osteophyte complex. There is facet  osteoarthropathy. No significant canal stenosis. Mild right neural foraminal  narrowing.  C7-T1: No disc herniation. No significant canal or foraminal stenosis.     The paravertebral soft tissues are unremarkable.   "   IMPRESSION:  Mild multilevel cervical spondylosis most severe at C5-C6 and C6-C7  minimally progressed as compared to the prior examination from 2014.            ASSESSMENT & PLAN  Meghan Hernández is a 48 y.o. old female with PMH MDD, RA, ankylosing spondylitis who presents for F/U neck pain    1) Cervical radic  -Neck pain since 2021 radiating to right upper extremity with subjective fourth and fifth digit numbness, tingling, weakness. Possible contribution from rheumatoid arthritis, ankylosing spondylitis  -Refractive to Tylenol, NSAIDs, muscle relaxants, Cymbalta, topical lidocaine, >6 w PT  -MRI C-spine 8/30/2023: Multilevel spondylosis featuring grade 1 C4-5 listhesis, severe facet arthropathy at C5-6 and C6-7 with right neuroforaminal stenosis  -C7-T1 GINA 12/28/23: 80% ongoing relief     2) LBP  -Low back pain since prior to 2008 with right leg pain, numbness, weakness. There is appreciable weakness in bilateral lower extremities possibly related to deconditioning rather than radiculopathy. Pain, however, may be related to ankylosing spondylitis  -Refractive to Tylenol, NSAIDs, muscle relaxants, Cymbalta, topical lidocaine, chiropractics, >6 w PT. Cannot tolerate return to PT due to pain  -x-ray lumbar spine 5/18/2023: L5-S1 facet arthropathy  -Reviewed/discussed CT L-spine 12/18/23: mild disc bulging at L4-5, L5-S1 with mild R NFS at L4-5  -Numerous R SIJ-provocative maneuvers on exam. Schedule diagnostic/therapeutic R SIJ CSI    3) Fibromyalgia  -Diffuse whole body pain for yrs on hx RA and ankylosing spondylitis on duloxetine  -Refractive to Tylenol, NSAIDs, muscle relaxants, Cymbalta, topical lidocaine, chiropractics, >6 w PT  -DC gabapentin. Start pregabalin 75 mg BID  -Refill methocarbamol 750 mg QID PRN      Discussed procedure risks/benefits in detail with patient. Pt meets medical necessity for procedure due to failure of conservative measures. Reviewed procedural risks including bleeding, infection,  nerve damage, paralysis. Also reviewed mitigating factors such as screening for infection/blood thinner use, sterile precautions, and image-guidance when applicable. All questions answered. Pt/guardian expressed understanding and choose to proceed            Sofiya Mandujano MD  Anesthesiologist & Interventional Pain Physician   [facility]  O: [phone]  F: [phone]  2:25 PM  01/03/24

## 2024-01-08 ENCOUNTER — TELEPHONE (OUTPATIENT)
Dept: PAIN MEDICINE | Facility: CLINIC | Age: 49
End: 2024-01-08
Payer: COMMERCIAL

## 2024-01-09 ENCOUNTER — APPOINTMENT (OUTPATIENT)
Dept: PHYSICAL THERAPY | Facility: CLINIC | Age: 49
End: 2024-01-09
Payer: COMMERCIAL

## 2024-01-15 NOTE — PROGRESS NOTES
Physical Therapy Treatment    Patient Name: Meghan Hernández  MRN: [id number]  Encounter date:  1/17/2024  Time Calculation  Start Time: 1501  Stop Time: 1537  Time Calculation (min): 36 min     PT Therapeutic Procedures Time Entry  Therapeutic Exercise Time Entry: 33    Visit Number:  7 (including evaluation)  Planned total visits: 12  Visit Authorized:  12    Current Problem  1. Lumbosacral radiculitis  Follow Up In Physical Therapy      2. Cervical radiculitis  Follow Up In Physical Therapy          Precautions  Precautions  Precautions Comment: Cx ankylosing spondylosis; Monitor Cx and lx pain/symptoms.  Modify activity as needed      Pain  Pain Assessment: 0-10  Pain Score: 6 (Post treatment pain:  0)    Subjective  General  General Comment: Pt reports her pain is very high today.  The Doctor she last saw believes she has some SI joint involvement and recommends an SI injection    PT  Visit  Response to Previous Treatment: Patient with no complaints from previous session.    Objective  Pt ambulates and moves cautiously and tentatively while on deck noting her pain continues to be increased as it was during previous session    Aquatic Therapy:       Walk across pool for core control, balance:  forward/backward/side step 3.5' depth x 3 laps each     3.5' depth at HR with UE support:  - Heel raise x 10 reps  - Toe raise x 10reps  - Hip SLR x 8 reps  - Hip ABD side step 8 reps  - HS curls x 8 reps     March across pool with noodle x2 laps     5' depth with pool noodle under arms   - Decompression x3mins   - bicycle x1 min  -decompression x2 min  -hip abd x1 min  -Decompression x2 min  -Bicycle x 1 min  -Decompression x 2min     At bench:  - Scapular retraction, palms up [id number]  - Row x 10 reps (with noodle)  - Bicep curls x10 reps  - IR/ER x10 reps  - Slow punch outs [id number]  - LAQ's x 10reps  - Ankle pumps x 20 reps     Fwd walking across pool x2 laps   Billed Treatment Time:  Aquatic Exercise 33 min    Assessment:  PT  Assessment  Assessment Comment: Pt paced her exercises and was able to complete her program without an increase in symptoms.  Pt's response to treatment:  good for modified program  Areas of improvements:  able to perform lower level core and LE strengthening in both wtb and non-wtb  Limitations/deficits:  pain, weakness    Plan:     Continue with current POC/no changes    Assessment of current progress against goals:  Progressing toward functional goals    Goals:  Active       PT Problem - Cx / Lx       PT STG       Start:  11/15/23    Expected End:  12/30/23       1.  Improve Cx strength to Fair at deficits   2.  Improve LE AROM flexion by 10-20 degrees at deficits  3.  Improve LE strength by ½ mm grade at deficits  4.  Improve trunk strength to Fair + or better  5.  Improve sitting posture with correct alignment of Cx region and shoulders  6.  Pain:  4 to 6           PT STG Functional        Start:  11/15/23    Expected End:  12/30/23       1.  Pt able to sit for up to 30 minutes 60% of the time with min to no pain and popping  2.  Pt able to sit at work, hold her grand children and perform IADLs, 40% of the time with minimal pain         PT LTG       Start:  11/15/23    Expected End:  02/13/24       1.  Improve Cx strength to Fair+ / Good - at deficits   2.  Improve LE AROM flexion by 70 degrees   3.  Improve LE strength to 4/5  4.  Improve trunk strength to Good -  or better  5.  Improve sitting posture with correct alignment of Cx region and shoulders with sustained positions  6.  Pain:  3 to 4  7.  Functional Outcome Measure:  25         PT LTG functional       Start:  11/15/23    Expected End:  02/13/24       1.  Pt able to sit for up to 45 minutes 75% of the time with min to no pain and popping  2.  Pt able to sit at work, hold her grand children and perform IADLs, 60% of the time with minimal pain to no pain         Patient Stated Goal 1       Start:  11/15/23    Expected End:  02/13/24       Neck:  Turn  her head without pain and popping  Back:  Sit at work, hold her grandchildren, house activities without pain

## 2024-01-17 ENCOUNTER — TREATMENT (OUTPATIENT)
Dept: PHYSICAL THERAPY | Facility: CLINIC | Age: 49
End: 2024-01-17
Payer: COMMERCIAL

## 2024-01-17 DIAGNOSIS — M54.17 LUMBOSACRAL RADICULITIS: ICD-10-CM

## 2024-01-17 DIAGNOSIS — M54.12 CERVICAL RADICULITIS: ICD-10-CM

## 2024-01-17 PROCEDURE — 97110 THERAPEUTIC EXERCISES: CPT | Mod: GP | Performed by: PHYSICAL THERAPIST

## 2024-01-17 ASSESSMENT — PAIN - FUNCTIONAL ASSESSMENT: PAIN_FUNCTIONAL_ASSESSMENT: 0-10

## 2024-01-17 ASSESSMENT — PAIN SCALES - GENERAL: PAINLEVEL_OUTOF10: 6

## 2024-01-19 NOTE — PROGRESS NOTES
Physical Therapy Treatment    Patient Name: Meghan Hernández  MRN: [id number]  Encounter date:  1/22/2024  Time Calculation  Start Time: 1644  Stop Time: 1719  Time Calculation (min): 35 min     PT Therapeutic Procedures Time Entry  Aquatic Therapy Time Entry: 30    Visit Number:  8 (including evaluation)  Planned total visits: 12  Visit Authorized:  12    Current Problem  1. Lumbosacral radiculitis  Follow Up In Physical Therapy      2. Cervical radiculitis  Follow Up In Physical Therapy          Precautions         Pain  Pain Assessment: 0-10  Pain Score:  (3.5; Post treatment Pain:  3.5)    Subjective  General  General Comment: Hands and wrists hurting alot.  Increased pain the day after the following day.  Able to sleep in bed the past two nights. SI injection coming up    PT  Visit  Response to Previous Treatment: Patient with no complaints from previous session.    Objective  Improved posture    Aquatic Therapy:       Walk across pool for core control, balance:  forward/backward/side step 3.5' depth x 3 laps each     3.5' depth at HR with UE support:  - Heel raise x 12 reps  - Toe raise x 12 reps  - Hip SLR x 10 reps  - Hip ABD side step 10 reps  - HS curls x 10 reps     March across pool with noodle x2 laps     5' depth with pool noodle under arms   - Decompression x3mins   - bicycle x1 min  -decompression x2 min  -hip abd x1 min  -Decompression x2 min  -Bicycle x 1 min  -Decompression x 2min     At bench:  - Scapular retraction, palms up [id number]  - Row x 10 reps (with noodle)  - Bicep curls x10 reps  - IR/ER x10 reps  - Slow punch outs [id number]  - LAQ's x 10reps  - Ankle pumps x 20 reps     Fwd walking across pool x2 laps   Billed Treatment Time:  Aquatic Exercise 30 min    Assessment:  PT Assessment  Assessment Comment: Pt agreed to increasing reps by 2 for some of the WTB exercises  Pt's response to treatment:  better  Areas of improvements:  tolerance for exercises and decreased pain  Limitations/deficits:   weakness, pain    Plan:     Continue with current POC/no changes    Assessment of current progress against goals:  Progressing toward functional goals    Goals:  Active       PT Problem - Cx / Lx       PT STG       Start:  11/15/23    Expected End:  12/30/23       1.  Improve Cx strength to Fair at deficits   2.  Improve LE AROM flexion by 10-20 degrees at deficits  3.  Improve LE strength by ½ mm grade at deficits  4.  Improve trunk strength to Fair + or better  5.  Improve sitting posture with correct alignment of Cx region and shoulders  6.  Pain:  4 to 6           PT STG Functional        Start:  11/15/23    Expected End:  12/30/23       1.  Pt able to sit for up to 30 minutes 60% of the time with min to no pain and popping  2.  Pt able to sit at work, hold her grand children and perform IADLs, 40% of the time with minimal pain         PT LTG       Start:  11/15/23    Expected End:  02/13/24       1.  Improve Cx strength to Fair+ / Good - at deficits   2.  Improve LE AROM flexion by 70 degrees   3.  Improve LE strength to 4/5  4.  Improve trunk strength to Good -  or better  5.  Improve sitting posture with correct alignment of Cx region and shoulders with sustained positions  6.  Pain:  3 to 4  7.  Functional Outcome Measure:  25         PT LTG functional       Start:  11/15/23    Expected End:  02/13/24       1.  Pt able to sit for up to 45 minutes 75% of the time with min to no pain and popping  2.  Pt able to sit at work, hold her grand children and perform IADLs, 60% of the time with minimal pain to no pain         Patient Stated Goal 1       Start:  11/15/23    Expected End:  02/13/24       Neck:  Turn her head without pain and popping  Back:  Sit at work, hold her grandchildren, house activities without pain

## 2024-01-22 ENCOUNTER — TREATMENT (OUTPATIENT)
Dept: PHYSICAL THERAPY | Facility: CLINIC | Age: 49
End: 2024-01-22
Payer: COMMERCIAL

## 2024-01-22 DIAGNOSIS — M54.12 CERVICAL RADICULITIS: ICD-10-CM

## 2024-01-22 DIAGNOSIS — M54.17 LUMBOSACRAL RADICULITIS: ICD-10-CM

## 2024-01-22 PROCEDURE — 97113 AQUATIC THERAPY/EXERCISES: CPT | Mod: GP | Performed by: PHYSICAL THERAPIST

## 2024-01-22 ASSESSMENT — PAIN - FUNCTIONAL ASSESSMENT: PAIN_FUNCTIONAL_ASSESSMENT: 0-10

## 2024-01-24 ENCOUNTER — APPOINTMENT (OUTPATIENT)
Dept: PHYSICAL THERAPY | Facility: CLINIC | Age: 49
End: 2024-01-24
Payer: COMMERCIAL

## 2024-01-25 ENCOUNTER — HOSPITAL ENCOUNTER (OUTPATIENT)
Dept: RADIOLOGY | Facility: HOSPITAL | Age: 49
Discharge: HOME | End: 2024-01-25
Payer: COMMERCIAL

## 2024-01-25 ENCOUNTER — HOSPITAL ENCOUNTER (OUTPATIENT)
Dept: GASTROENTEROLOGY | Facility: HOSPITAL | Age: 49
Discharge: HOME | End: 2024-01-25
Payer: COMMERCIAL

## 2024-01-25 VITALS
BODY MASS INDEX: 30.82 KG/M2 | SYSTOLIC BLOOD PRESSURE: 134 MMHG | HEIGHT: 60 IN | DIASTOLIC BLOOD PRESSURE: 87 MMHG | OXYGEN SATURATION: 100 % | TEMPERATURE: 96.8 F | HEART RATE: 92 BPM | WEIGHT: 157 LBS | RESPIRATION RATE: 16 BRPM

## 2024-01-25 DIAGNOSIS — M46.1 SACROILIITIS, NOT ELSEWHERE CLASSIFIED (CMS-HCC): ICD-10-CM

## 2024-01-25 PROCEDURE — 27096 INJECT SACROILIAC JOINT: CPT | Performed by: ANESTHESIOLOGY

## 2024-01-25 ASSESSMENT — PAIN SCALES - GENERAL
PAINLEVEL_OUTOF10: 6
PAINLEVEL_OUTOF10: 4

## 2024-01-25 ASSESSMENT — PAIN - FUNCTIONAL ASSESSMENT
PAIN_FUNCTIONAL_ASSESSMENT: 0-10
PAIN_FUNCTIONAL_ASSESSMENT: 0-10

## 2024-01-25 ASSESSMENT — PAIN DESCRIPTION - DESCRIPTORS
DESCRIPTORS: SHARP
DESCRIPTORS: ACHING;SHARP

## 2024-01-25 NOTE — INTERVAL H&P NOTE
H&P reviewed. The patient was examined and there are no changes to the H&P.  Meghan Hernández is a 48 y.o. female with PMH MDD, RA, ankylosing spondylitis who presents for R SIJ CSI to target pain generator as seen on PE and minimize risk/likelihood of chronic opioid use and/or surgery. Patient's pain stable and persistent from last visit.  Denies allergies to Latex, steroids, local anesthetics, or iodine/contrast. Denies being on blood thinners. Not diabetic.  Denies fever, chills, NS, CP, SOB, cough, N/V.    Discussed procedure risks/benefits in detail with patient. Pt meets medical necessity for procedure due to failure of conservative measures. Reviewed procedural risks including bleeding, infection, nerve damage, paralysis. Also reviewed mitigating factors such as screening for infection/blood thinner use, sterile precautions, and image-guidance when applicable. All questions answered. Pt/guardian expressed understanding and choose to proceed      Sofiya Mandujano MD  Anesthesiologist & Interventional Pain Physician   [facility]  O: [phone]  F: [phone]  8:05 AM  01/25/24  
[Negative] : Heme/Lymph

## 2024-01-25 NOTE — OP NOTE
"PROCEDURE: RIGHT Sacroiliac joint injection(s)   Location: [facility]  Informed consent obtained  Time Out Performed: Yes   Proceduralist: Sofiya Mandujano MD   Correct procedure: Confirmed    Correct side/site: Confirmed    Correct patient position: Confirmed    Correct side/site marking visible: Confirmed    Correct X-Rays available:Confirmed    Equipment available: Confirmed      Diagnosis:  sacroiliitis    Patient Position: prone   Sterile prep with: Chloroprep and draped in the standard fashion   Approach: to the articularjoint from the inferior posterior  margin   Needle(s): #22 G Quincke, Length: 3.5 inches   Image Guidance: Fluoroscopy     Approach: AP contralateral oblique view.  Posterior sacroiliac joint identified.  After local anesthetic with 1% lidocaine using 25 G 1.5\" needle, 22 G spinal needle advanced coaxially into lower 1/3 joint space.  Needle position confirmed in lateral view to be just anterior to sacral plate.  Anesthetic soln injected.  Pt tolerated without issue.     Images saved to PACS   Injectate: 40 mg methylprednisolone + 2 ml 0.5% bupivacaine per side injected     See nursing records for VS:; Pulse oximetry, NIBP monitoring for entire procedure    DISCHARGE SUMMARY:   Complications: None   Condition on Discharge: Stable and unchanged from admission   Disposition/Discharge: Home       Sofiya Mandujano MD  Anesthesiologist & Interventional Pain Physician   [facility] [place]  O: [phone]  F: [phone]  8:05 AM  01/25/24  "

## 2024-01-25 NOTE — DISCHARGE INSTRUCTIONS
DISCHARGE INSTRUCTIONS FOR INJECTIONS     You underwent a right sacroiliac joint injection today    Aftermost injections, it is recommended that you relax and limit your activity for the remainder of the day unless you have been told otherwise by your pain physician.  You should not drive a car, operate machinery, or make important legal decisions unless otherwise directed by your pain physician.  You may resume your normal activity, including exercise, tomorrow.      Keep a written pain diary of how much pain relief you experienced following the injection procedure and the length of time of pain relief you experienced pain relief. Following diagnostic injections like medial branch nerve blocks, sacroiliac joint blocks, stellate ganglion injections and other blocks, it is very important you record the specific amount of pain relief you experienced immediately after the injectionand how long it lasted. Your doctor will ask you for this information at your follow up visit.     For all injections, please keep the injection site dry and inspect the site for a couple of days. You may remove the Band-Aid the day of the injection at any time.     Some discomfort, bruising or slight swelling may occur at the injection site. This is not abnormal if it occurs.  If needed you may:    -Take over the counter medication such as Tylenol or Motrin.   -Apply an ice pack for 30 minutes, 2 to 3 times a day for the first 24 hours.     You may shower today; no soaking baths, hot tubs, whirlpools or swimming pools for two days.      If you are given steroids in your injection, it may take 3-5 days for the steroid medication to take effect. You may notice a worsening of your symptoms for 1-2 days after the injection. This is not abnormal.  You may use acetaminophen, ibuprofen, or prescription medication that your doctor may have prescribed for you if you need to do so.     A few common side effects of steroids include facial flushing,  sweating, restlessness, irritability,difficulty sleeping, increase in blood sugar, and increased blood pressure. If you have diabetes, please monitor your blood sugar at least once a day for at least 5 days. If you have poorly controlled high blood pressure, monitoryour blood pressure for at least 2 days and contact your primary care physician if these numbers are unusually high for you.      If you take aspirin or non-steroidal anti-inflammatory drugs (examples are Motrin, Advil, ibuprofen, Naprosyn, Voltaren, Relafen, etc.) you may restart these this evening, but stop taking it 3 days before your next appointment, unless instructed otherwiseby your physician.      You do not need to discontinue non-aspirin-containing pain medications prior to an injection (examples: Celebrex, tramadol, hydrocodone and acetaminophen).      If you take a blood thinning medication (Coumadin, Lovenox, Fragmin,Ticlid, Plavix, Pradaxa, etc.), please discuss this with your primary care physician/cardiologist and your pain physician. These medications MUST be discontinued before you can have an injection safely, without the risk of uncontrolled bleeding. If these medications are not discontinued for an appropriate period of time, you will not be able to receivean injection.      If you are taking Coumadin, please have your INR checked the morning of your procedure and bringthe result to your appointment unless otherwise instructed. If your INR is over 1.2, your injection may need to be rescheduled to avoid uncontrolled bleeding from the needle placement.     Call [facility] at [phone] between 8am-4pm Monday - Friday if you are experiencing the following:    If you received an epidural or spinal injection:    -Headache that doesnot go away with medicine, is worse when sitting or standing up, and is greatly relieved upon lying down.   -Severe pain worse than or different than your baseline pain.   -Chills or fever  (101º F or greater).   -Drainage or signs of infection at the injection site     Go directly to the Emergency Department if you are experiencing the following and received an epidural or spinal injection:   -Abrupt weakness or progressive weakness in your legs that starts after you leave the clinic.   -Abrupt severe or worsening numbness in your legs.   -Inability to urinate after the injection or loss of bowel or bladder control without the urge to defecate or urinate.     If you have a clinical question that cannot wait until your next appointment, please call [phone] between 8am-4pm Monday - Friday or send a [employer] message. We do our best to return all non-emergency messages within 24 hours, Monday - Friday. A nurse or physician will return your message.      If you need to cancel an appointment, please call the scheduling staff at [phone] during normal business hours or leave a message at least 24 hours in advance.     If you are going to be sedated for your next procedure, you MUST have responsible adult who can legally drive accompany you home. You cannot eat or drink for eight hours prior to the planned procedure if you are going to receive sedation. You may take your non-blood thinning medications with a small sip of water.

## 2024-02-05 DIAGNOSIS — M79.7 FIBROMYALGIA: ICD-10-CM

## 2024-02-05 RX ORDER — PREGABALIN 75 MG/1
75 CAPSULE ORAL 2 TIMES DAILY
Qty: 60 CAPSULE | Refills: 0 | OUTPATIENT
Start: 2024-02-05 | End: 2024-03-06

## 2024-02-05 RX ORDER — PREGABALIN 75 MG/1
75 CAPSULE ORAL 2 TIMES DAILY
Qty: 60 CAPSULE | Refills: 0 | Status: SHIPPED | OUTPATIENT
Start: 2024-02-05 | End: 2024-02-09 | Stop reason: DRUGHIGH

## 2024-02-09 ENCOUNTER — TELEMEDICINE (OUTPATIENT)
Dept: PAIN MEDICINE | Facility: CLINIC | Age: 49
End: 2024-02-09
Payer: COMMERCIAL

## 2024-02-09 ENCOUNTER — TELEMEDICINE (OUTPATIENT)
Dept: PRIMARY CARE | Facility: CLINIC | Age: 49
End: 2024-02-09
Payer: COMMERCIAL

## 2024-02-09 DIAGNOSIS — R05.1 ACUTE COUGH: ICD-10-CM

## 2024-02-09 DIAGNOSIS — M54.50 CHRONIC LOW BACK PAIN, UNSPECIFIED BACK PAIN LATERALITY, UNSPECIFIED WHETHER SCIATICA PRESENT: ICD-10-CM

## 2024-02-09 DIAGNOSIS — M46.1 SACROILIITIS (CMS-HCC): ICD-10-CM

## 2024-02-09 DIAGNOSIS — G89.29 CHRONIC LOW BACK PAIN, UNSPECIFIED BACK PAIN LATERALITY, UNSPECIFIED WHETHER SCIATICA PRESENT: ICD-10-CM

## 2024-02-09 DIAGNOSIS — R06.02 SHORTNESS OF BREATH: Primary | ICD-10-CM

## 2024-02-09 DIAGNOSIS — M47.816 LUMBAR SPONDYLOSIS: Primary | ICD-10-CM

## 2024-02-09 DIAGNOSIS — M79.7 FIBROMYALGIA: ICD-10-CM

## 2024-02-09 PROBLEM — G89.4 CHRONIC PAIN SYNDROME: Status: ACTIVE | Noted: 2024-02-09

## 2024-02-09 PROBLEM — Z01.419 WELL WOMAN EXAM WITH ROUTINE GYNECOLOGICAL EXAM: Status: RESOLVED | Noted: 2023-09-01 | Resolved: 2024-02-09

## 2024-02-09 PROBLEM — M06.9 RHEUMATOID ARTHRITIS (MULTI): Status: ACTIVE | Noted: 2023-05-15

## 2024-02-09 PROBLEM — G47.69 SLEEP-RELATED MOVEMENT DISORDER: Status: ACTIVE | Noted: 2024-02-09

## 2024-02-09 PROBLEM — J37.0 CHRONIC LARYNGITIS: Status: RESOLVED | Noted: 2022-06-21 | Resolved: 2024-02-09

## 2024-02-09 PROBLEM — K64.8 INTERNAL HEMORRHOIDS: Status: ACTIVE | Noted: 2024-02-09

## 2024-02-09 PROBLEM — M54.2 NECK PAIN: Status: ACTIVE | Noted: 2024-02-09

## 2024-02-09 PROBLEM — M45.9 ANKYLOSING SPONDYLITIS (MULTI): Status: ACTIVE | Noted: 2023-05-18

## 2024-02-09 PROCEDURE — 99213 OFFICE O/P EST LOW 20 MIN: CPT | Performed by: FAMILY MEDICINE

## 2024-02-09 PROCEDURE — 1036F TOBACCO NON-USER: CPT | Performed by: FAMILY MEDICINE

## 2024-02-09 PROCEDURE — 99213 OFFICE O/P EST LOW 20 MIN: CPT | Performed by: ANESTHESIOLOGY

## 2024-02-09 PROCEDURE — 1036F TOBACCO NON-USER: CPT | Performed by: ANESTHESIOLOGY

## 2024-02-09 RX ORDER — PREGABALIN 100 MG/1
100 CAPSULE ORAL 2 TIMES DAILY
Qty: 60 CAPSULE | Refills: 0 | Status: SHIPPED | OUTPATIENT
Start: 2024-02-09 | End: 2024-03-28

## 2024-02-09 RX ORDER — SECUKINUMAB 150 MG/ML
INJECTION SUBCUTANEOUS
COMMUNITY
Start: 2024-01-30

## 2024-02-09 ASSESSMENT — ENCOUNTER SYMPTOMS
HEMATOLOGIC/LYMPHATIC NEGATIVE: 1
SORE THROAT: 1
NAUSEA: 0
VOMITING: 0
CARDIOVASCULAR NEGATIVE: 1
DIARRHEA: 0
EYES NEGATIVE: 1
BACK PAIN: 1
ENDOCRINE NEGATIVE: 1
CONSTITUTIONAL NEGATIVE: 1
PSYCHIATRIC NEGATIVE: 1
NECK PAIN: 1
GASTROINTESTINAL NEGATIVE: 1
COUGH: 1
RHINORRHEA: 0
RESPIRATORY NEGATIVE: 1
WEAKNESS: 1
NUMBNESS: 1

## 2024-02-09 ASSESSMENT — PAIN SCALES - GENERAL
PAINLEVEL: 6
PAINLEVEL_OUTOF10: 6

## 2024-02-09 ASSESSMENT — PATIENT HEALTH QUESTIONNAIRE - PHQ9
SUM OF ALL RESPONSES TO PHQ9 QUESTIONS 1 AND 2: 0
2. FEELING DOWN, DEPRESSED OR HOPELESS: NOT AT ALL
1. LITTLE INTEREST OR PLEASURE IN DOING THINGS: NOT AT ALL

## 2024-02-09 ASSESSMENT — PAIN - FUNCTIONAL ASSESSMENT: PAIN_FUNCTIONAL_ASSESSMENT: 0-10

## 2024-02-09 ASSESSMENT — PAIN DESCRIPTION - DESCRIPTORS: DESCRIPTORS: ACHING;SHARP

## 2024-02-09 NOTE — PROGRESS NOTES
PAIN MANAGEMENT FOLLOW-UP OFFICE NOTE    Date of Service: 2/9/2024    SUBJECTIVE    CHIEF COMPLAINT: neck pain    HISTORY OF PRESENT ILLNESS  Interview conducted via telehealth    Meghan Hernández is a 48 y.o. female with PMH MDD, RA, ankylosing spondylitis who presents for follow-up neck pain.     On 1/25, pt underwent R SIJ CSI with 50% ongoing relief. RLE pain controlled. Since that time, pt feels she has more noticeable pain in her left low back and higher in her low back.    Neck pain controlled.    Pt denies new-onset numbness, weakness, bowel/bladder incontinence.  Pt denies recent infection, allergy to Latex/iodine/contrast. Patient is currently taking the following blood thinner(s): N/A    Procedure Log:  -R SIJ CSI 1/25/24: 50% ongoing relief  -C7-T1 GINA 12/28/23: 80% ongoing relief  -C7-T1 GINA 10/26/23: 100% relief RUE, 40% neck    REVIEW OF SYSTEMS  Review of Systems   Constitutional: Negative.    HENT: Negative.     Eyes: Negative.    Respiratory: Negative.     Cardiovascular: Negative.    Gastrointestinal: Negative.    Endocrine: Negative.    Musculoskeletal:  Positive for back pain and neck pain.   Skin: Negative.    Neurological:  Positive for weakness and numbness.   Hematological: Negative.    Psychiatric/Behavioral: Negative.         PAST MEDICAL HISTORY  Past Medical History:   Diagnosis Date    AS (ankylosing spondylitis) (CMS/HCC)     Rheumatoid arthritis (CMS/HCC)      Past Surgical History:   Procedure Laterality Date    COLONOSCOPY      DILATION AND CURETTAGE OF UTERUS      ENDOSCOPY PROCEDURE NOT PERFORMED       Family History   Problem Relation Name Age of Onset    Thyroid disease Mother      Colon cancer Father      Thyroid disease Sister      Cerebral palsy Brother          1/2 brother    Stomach cancer Maternal Grandmother      Breast cancer Other AUNT     Heart attack Other UNCLE        CURRENT MEDICATIONS  Current Outpatient Medications   Medication Sig Dispense Refill    acetaminophen  (TylenoL) 325 mg tablet Take 2 tablets (650 mg) by mouth every 6 hours if needed for mild pain (1 - 3).      cyanocobalamin (Vitamin B-12) 100 mcg tablet Take by mouth. As directed      DULoxetine (Cymbalta) 60 mg DR capsule Take 1 capsule (60 mg) by mouth once daily. 90 capsule 1    ibuprofen 600 mg tablet Take 1 tablet (600 mg) by mouth every 6 hours if needed for mild pain (1 - 3). FOR PATIENT RECEIVING KETOROLAC.  DO NO GIVE IBUPROFEN FOR 6 HRS FOLLOWING KETOROLAC ADMIN.  NOT TO EXCEED 5 TABLES IN 24 HOURS      magnesium oxide (Mag-Ox) 200 mg magnesium tablet Take 2 tablets (400 mg) by mouth once daily. WITH A MEAL      methocarbamol (Robaxin) 750 mg tablet Take 1 tablet (750 mg) by mouth 4 times a day as needed for muscle spasms. 360 tablet 2    norethindrone-e.estradioL-iron (Lo Loestrin) 1 mg-10 mcg (24)/10 mcg (2) tablet Take 1 tablet by mouth once daily. 84 tablet 3    omeprazole (PriLOSEC) 40 mg DR capsule Take 1 capsule (40 mg) by mouth once daily.      pregabalin (Lyrica) 75 mg capsule Take 1 capsule by mouth twice daily 60 capsule 0    prenatal vit no.124/iron/folic (PRENATAL VITAMIN ORAL)       secukinumab (Cosentyx) 150 mg/mL syringe 1 mL (150 mg) 1 time. monthly       No current facility-administered medications for this visit.       ALLERGIES AND DRUG REACTIONS  No Known Allergies       OBJECTIVE  Visit Vitals  LMP 07/01/2023 (Approximate) Comment: signed declination form   OB Status Postmenopausal   Smoking Status Former       Last Recorded Pain Score (if available):                Physical Exam  Vitals and nursing note reviewed.     Limited by telehealth  General: Sitting in chair, NAD  Head: NCAT  Eyes: Sclera/conjunctiva clear, EOMI, PERRL  Nose/mouth: MMM  Lungs: Good/equal chest excursion  Abdomen: Soft, ND  Ext: No cyanosis/edema  MSK: able to move extremities    Neuro: AAOx3   Psych: affect nl  Skin: no rash/lesions      REVIEW OF LABORATORY DATA  I have reviewed the following lab  "results:  WBC   Date Value Ref Range Status   09/07/2021 6.5 4.5 - 11.0 K/UL Final     RBC   Date Value Ref Range Status   09/07/2021 4.48 4.0 - 4.9 M/UL Final     Hemoglobin   Date Value Ref Range Status   09/07/2021 13.1 12.0 - 15.0 GM/DL Final     Hematocrit   Date Value Ref Range Status   09/07/2021 41.5 36 - 44 % Final     MCV   Date Value Ref Range Status   09/07/2021 92.6 80 - 100 FL Final     MCH   Date Value Ref Range Status   09/07/2021 29.2 26 - 34 PG Final     MCHC   Date Value Ref Range Status   09/07/2021 31.6 31 - 37 % Final     Platelets   Date Value Ref Range Status   09/07/2021 246 150 - 450 K/UL Final     MPV   Date Value Ref Range Status   09/07/2021 11.0 7.0 - 12.6 CU Final     Sodium   Date Value Ref Range Status   09/07/2021 140 133 - 145 MMOL/L Final     Potassium   Date Value Ref Range Status   09/07/2021 4.2 3.4 - 5.1 MMOL/L Final     Bicarbonate   Date Value Ref Range Status   09/07/2021 24 24 - 31 MMOL/L Final     Urea Nitrogen   Date Value Ref Range Status   09/07/2021 4 (L) 8 - 25 MG/DL Final     Calcium   Date Value Ref Range Status   09/07/2021 9.4 8.5 - 10.4 MG/DL Final     No results found for: \"PROTIME\", \"PTT\", \"INR\", \"FIBRINOGEN\"      REVIEW OF RADIOLOGY   I have reviewed the following:  Radiology Studies           MRI c-spine 8/30/23:  There is loss of the normal cervical lordosis. No acute fracture is  identified. There is grade 1 anterolisthesis C4 on C5. There are posterior  osteophytes extending from the C3-C6 levels. No  STIR abnormalities are seen  within the marrow.     The visualized cord signal is grossly unremarkable.     C2-C3: No disc herniation. No significant canal or foraminal stenosis.  C3-C4: There is a posterior disc osteophyte complex. No significant canal or  foraminal stenosis.  C4-C5: There is a posterior disc osteophyte complex. No significant canal or  foraminal stenosis.  C5-C6: There is a posterior disc osteophyte complex. There is " facet  osteoarthropathy. No significant canal stenosis. Mild right neural foraminal  narrowing.  C6-C7: There is a posterior disc osteophyte complex. There is facet  osteoarthropathy. No significant canal stenosis. Mild right neural foraminal  narrowing.  C7-T1: No disc herniation. No significant canal or foraminal stenosis.     The paravertebral soft tissues are unremarkable.     IMPRESSION:  Mild multilevel cervical spondylosis most severe at C5-C6 and C6-C7  minimally progressed as compared to the prior examination from 2014.            ASSESSMENT & PLAN  Meghan Hernández is a 48 y.o. old female with PMH MDD, RA, ankylosing spondylitis who presents for F/U neck pain    1) Cervical radic  -Neck pain since 2021 radiating to right upper extremity with subjective fourth and fifth digit numbness, tingling, weakness. Possible contribution from rheumatoid arthritis, ankylosing spondylitis  -Refractive to Tylenol, NSAIDs, muscle relaxants, Cymbalta, topical lidocaine, >6 w PT  -MRI C-spine 8/30/2023: Multilevel spondylosis featuring grade 1 C4-5 listhesis, severe facet arthropathy at C5-6 and C6-7 with right neuroforaminal stenosis  -C7-T1 GINA 12/28/23: 80% ongoing relief     2) LBP  -Low back pain since prior to 2008 with right leg pain, numbness, weakness. There is appreciable weakness in bilateral lower extremities possibly related to deconditioning rather than radiculopathy. Pain, however, may be related to ankylosing spondylitis  -Refractive to Tylenol, NSAIDs, muscle relaxants, Cymbalta, topical lidocaine, chiropractics, >6 w PT. Cannot tolerate return to PT due to pain  -x-ray lumbar spine 5/18/2023: L5-S1 facet arthropathy  -CT L-spine 12/18/23: mild disc bulging at L4-5, L5-S1 with mild R NFS at L4-5  -Schedule prognostic medial branch nerve blocks of BL L4-5, L5-S1 facets with local anesthetic only under fluoroscopic guidance to assess candidacy for RFA    3) Sacroiliitis  -R-sided LBP reproduced on numerous R  SIJ-provocative maneuvers on exam  -R SIJ CSI 1/25/24: 50% ongoing relief    3) Fibromyalgia  -Diffuse whole body pain for yrs on hx RA and ankylosing spondylitis on duloxetine  -Refractive to Tylenol, NSAIDs, muscle relaxants, Cymbalta, topical lidocaine, chiropractics, >6 w PT, gabapentin  -Cont methocarbamol 750 mg QID PRN  -Inc pregabalin to 100 mg BID        Discussed procedure risks/benefits in detail with patient. Pt meets medical necessity for procedure due to failure of conservative measures. Reviewed procedural risks including bleeding, infection, nerve damage, paralysis. Also reviewed mitigating factors such as screening for infection/blood thinner use, sterile precautions, and image-guidance when applicable. All questions answered. Pt/guardian expressed understanding and choose to proceed      Telehealth 11:11-11:24; total encounter time 23 min          Sofiya Mandujano MD  Anesthesiologist & Interventional Pain Physician   [facility]  O: [phone]  F: [phone]  11:15 AM  02/09/24

## 2024-02-09 NOTE — PROGRESS NOTES
Subjective   Patient ID: Meghan Hernández is a 48 y.o. female who presents for chest congestion.      Raspy voice for 2 days. Chest feels congested. Gets short of breath with any movement.   She is on immunosuppressive treatment.     URI   This is a new problem. The current episode started yesterday. The problem has been rapidly worsening. There has been no fever. Associated symptoms include congestion, coughing and a sore throat. Pertinent negatives include no chest pain, diarrhea, nausea, rhinorrhea or vomiting.          Current Outpatient Medications:     Cosentyx Pen 150 mg/mL self-injector pen, , Disp: , Rfl:     acetaminophen (TylenoL) 325 mg tablet, Take 2 tablets (650 mg) by mouth every 6 hours if needed for mild pain (1 - 3)., Disp: , Rfl:     cyanocobalamin (Vitamin B-12) 100 mcg tablet, Take by mouth. As directed, Disp: , Rfl:     DULoxetine (Cymbalta) 60 mg DR capsule, Take 1 capsule (60 mg) by mouth once daily., Disp: 90 capsule, Rfl: 1    ibuprofen 600 mg tablet, Take 1 tablet (600 mg) by mouth every 6 hours if needed for mild pain (1 - 3). FOR PATIENT RECEIVING KETOROLAC.  DO NO GIVE IBUPROFEN FOR 6 HRS FOLLOWING KETOROLAC ADMIN.  NOT TO EXCEED 5 TABLES IN 24 HOURS, Disp: , Rfl:     methocarbamol (Robaxin) 750 mg tablet, Take 1 tablet (750 mg) by mouth 4 times a day as needed for muscle spasms., Disp: 360 tablet, Rfl: 2    norethindrone-e.estradioL-iron (Lo Loestrin) 1 mg-10 mcg (24)/10 mcg (2) tablet, Take 1 tablet by mouth once daily., Disp: 84 tablet, Rfl: 3    pregabalin (Lyrica) 100 mg capsule, Take 1 capsule (100 mg) by mouth 2 times a day., Disp: 60 capsule, Rfl: 0    Patient Active Problem List   Diagnosis    Daytime somnolence    Dysphagia    Gastroesophageal reflux disease without esophagitis    Irregular menses    Perimenopause    Schatzki's ring    Cervical radiculitis    Lumbosacral radiculitis    Radiculopathy, lumbosacral region    Radiculopathy, cervical region    Mood swings     Sleep-related movement disorder    Rheumatoid arthritis (CMS/HCC)    Internal hemorrhoids    Inflammation of sacroiliac joint (CMS/HCC)    Fibromyalgia    Neck pain    Chronic pain syndrome    Ankylosing spondylitis (CMS/HCC)         Review of Systems   HENT:  Positive for congestion and sore throat. Negative for rhinorrhea.    Respiratory:  Positive for cough.    Cardiovascular:  Negative for chest pain.   Gastrointestinal:  Negative for diarrhea, nausea and vomiting.       Objective   LMP 07/01/2023 (Approximate) Comment: signed declination form    Physical Exam  Constitutional:       Comments: Speech dyspnea with 6-7 words. No retracting or rapid breathing.    Pulmonary:      Effort: Pulmonary effort is normal.   Neurological:      Mental Status: She is alert.   Psychiatric:         Mood and Affect: Mood normal.         Behavior: Behavior normal.         Assessment/Plan   Problem List Items Addressed This Visit    None  Visit Diagnoses       Shortness of breath    -  Primary    Some speech dyspnea but no rapid breathing, retracting or accessory muscle use. Recommended she go to ER or UC as she ma needs imaging and her oxygen checked.    Acute cough        She reports chest congestion. She is also immunocompromised. She needs seen in person at UC or ER. She agreed. has PCP follow up in 3 d              Assessment, plans, tests, and follow up discussed with patient and patient verbalized understanding. Meghan was given an opportunity to ask questions and  any concerns were addressed including but not limited to importance of being seen ASAP. She agreed to go now. .

## 2024-02-12 ENCOUNTER — TELEMEDICINE (OUTPATIENT)
Dept: PRIMARY CARE | Facility: CLINIC | Age: 49
End: 2024-02-12
Payer: COMMERCIAL

## 2024-02-12 ENCOUNTER — TELEPHONE (OUTPATIENT)
Dept: PAIN MEDICINE | Facility: CLINIC | Age: 49
End: 2024-02-12

## 2024-02-12 DIAGNOSIS — R06.00 DYSPNEA, UNSPECIFIED TYPE: Primary | ICD-10-CM

## 2024-02-12 PROCEDURE — 1036F TOBACCO NON-USER: CPT | Performed by: FAMILY MEDICINE

## 2024-02-12 PROCEDURE — 99214 OFFICE O/P EST MOD 30 MIN: CPT | Performed by: FAMILY MEDICINE

## 2024-02-12 RX ORDER — BENZONATATE 200 MG/1
200 CAPSULE ORAL 3 TIMES DAILY PRN
COMMUNITY
Start: 2024-02-09 | End: 2024-05-30 | Stop reason: ALTCHOICE

## 2024-02-12 RX ORDER — ALBUTEROL SULFATE 90 UG/1
2 AEROSOL, METERED RESPIRATORY (INHALATION) EVERY 6 HOURS PRN
Qty: 8 G | Refills: 1 | Status: SHIPPED | OUTPATIENT
Start: 2024-02-12

## 2024-02-12 RX ORDER — DOXYCYCLINE HYCLATE 100 MG
100 TABLET ORAL DAILY
COMMUNITY
Start: 2024-02-09 | End: 2024-05-30 | Stop reason: ALTCHOICE

## 2024-02-12 ASSESSMENT — LIFESTYLE VARIABLES
SKIP TO QUESTIONS 9-10: 1
HOW OFTEN DO YOU HAVE A DRINK CONTAINING ALCOHOL: 2-4 TIMES A MONTH
AUDIT-C TOTAL SCORE: 2
HOW MANY STANDARD DRINKS CONTAINING ALCOHOL DO YOU HAVE ON A TYPICAL DAY: 1 OR 2
HOW OFTEN DO YOU HAVE SIX OR MORE DRINKS ON ONE OCCASION: NEVER

## 2024-02-12 ASSESSMENT — PAIN SCALES - GENERAL: PAINLEVEL: 6

## 2024-02-12 ASSESSMENT — PATIENT HEALTH QUESTIONNAIRE - PHQ9
2. FEELING DOWN, DEPRESSED OR HOPELESS: NOT AT ALL
SUM OF ALL RESPONSES TO PHQ9 QUESTIONS 1 AND 2: 0
1. LITTLE INTEREST OR PLEASURE IN DOING THINGS: NOT AT ALL

## 2024-02-12 NOTE — LETTER
February 12, 2024     Patient: Meghan Hernández   YOB: 1975   Date of Visit: 2/12/2024       To Whom It May Concern:    Meghan Hernández was seen in my clinic on 2/12/2024 at 12:00 pm. Please excuse Meghan for her absence from work on this day to make the appointment. Due to condition, please allow her to work from home through 2/16/2024. If she improves sooner, she will return to the office without restrictions.    If you have any questions or concerns, please don't hesitate to call.         Sincerely,         Pepe Gee MD        CC: No Recipients

## 2024-02-12 NOTE — PROGRESS NOTES
With patient's permission this is a telemedicine visit with video and audio.    History Of Present Illness  Meghan Hernández is a 48 y.o. female who calls in for Shortness of Breath (Cough, ST, Started Thursday. States the SOB is with exertion x Several weeks. Went to  Friday afternoon).    Had televisit 2/9/24. Was told to go to the ER or . Went to  and vitals were normal. Was prescribed doxycyline and tessalon perles.  She still short of breath.  No chest pain.        Past Medical History  She has a past medical history of AS (ankylosing spondylitis) (CMS/[facility], located [facility]) and Rheumatoid arthritis (CMS/[facility], located within [facility]).    Medications  Current Outpatient Medications on File Prior to Visit   Medication Sig Dispense Refill    acetaminophen (TylenoL) 325 mg tablet Take 2 tablets (650 mg) by mouth every 6 hours if needed for mild pain (1 - 3).      benzonatate (Tessalon) 200 mg capsule Take 1 capsule (200 mg) by mouth 3 times a day as needed for cough.      Cosentyx Pen 150 mg/mL self-injector pen       cyanocobalamin (Vitamin B-12) 100 mcg tablet Take by mouth. As directed      doxycycline (Vibra-Tabs) 100 mg tablet Take 1 tablet (100 mg) by mouth once daily.      DULoxetine (Cymbalta) 60 mg DR capsule Take 1 capsule (60 mg) by mouth once daily. 90 capsule 1    ibuprofen 600 mg tablet Take 1 tablet (600 mg) by mouth every 6 hours if needed for mild pain (1 - 3). FOR PATIENT RECEIVING KETOROLAC.  DO NO GIVE IBUPROFEN FOR 6 HRS FOLLOWING KETOROLAC ADMIN.  NOT TO EXCEED 5 TABLES IN 24 HOURS      methocarbamol (Robaxin) 750 mg tablet Take 1 tablet (750 mg) by mouth 4 times a day as needed for muscle spasms. 360 tablet 2    norethindrone-e.estradioL-iron (Lo Loestrin) 1 mg-10 mcg (24)/10 mcg (2) tablet Take 1 tablet by mouth once daily. 84 tablet 3    pregabalin (Lyrica) 100 mg capsule Take 1 capsule (100 mg) by mouth 2 times a day. 60 capsule 0     No current facility-administered medications on file prior to visit.        Surgical  History  She has a past surgical history that includes Procedure Not Performed; Colonoscopy; and Dilation and curettage of uterus.     Social History  She reports that she quit smoking about 4 years ago. Her smoking use included cigarettes. She has never used smokeless tobacco. She reports current alcohol use of about 2.0 standard drinks of alcohol per week. She reports that she does not use drugs.    Family History  Family History   Problem Relation Name Age of Onset    Thyroid disease Mother      Colon cancer Father      Thyroid disease Sister      Cerebral palsy Brother          1/2 brother    Stomach cancer Maternal Grandmother      Breast cancer Other AUNT     Heart attack Other UNCLE         Allergies  Patient has no known allergies.    On video:     Appearance: Normal appearance.      Effort: Respiratory effort is normal. Speaking in full sentences. No respiratory distress.     Mood and Affect: Mood normal.         Thought Content: Thought content normal.         Judgment: Judgment normal.      Last Recorded Vitals  There were no vitals taken for this visit.  (Vitals are taken by patient at home, if any reported)    Relevant Results      Assessment/Plan   Meghan was seen today for shortness of breath.  Diagnoses and all orders for this visit:  Dyspnea, unspecified type (Primary)  -     XR chest 2 views; Future  -     albuterol 90 mcg/actuation inhaler; Inhale 2 puffs every 6 hours if needed for wheezing or shortness of breath.          There are no discontinued medications.     Pepe Gee MD

## 2024-02-15 ENCOUNTER — HOSPITAL ENCOUNTER (OUTPATIENT)
Dept: RADIOLOGY | Facility: CLINIC | Age: 49
Discharge: HOME | End: 2024-02-15
Payer: COMMERCIAL

## 2024-02-15 DIAGNOSIS — R06.00 DYSPNEA, UNSPECIFIED TYPE: ICD-10-CM

## 2024-02-15 PROCEDURE — 71046 X-RAY EXAM CHEST 2 VIEWS: CPT

## 2024-02-27 ENCOUNTER — DOCUMENTATION (OUTPATIENT)
Dept: PHYSICAL THERAPY | Facility: CLINIC | Age: 49
End: 2024-02-27
Payer: COMMERCIAL

## 2024-02-27 NOTE — PROGRESS NOTES
Physical Therapy    Discharge Summary    Name: Meghan Hernández  MRN: [id number]  : 1975  Date: 24    Discharge Summary: PT    Discharge Information: Date of discharge 2024    Therapy Summary: Pt last seen 2024.  She was scheduled to have an injection. She did not schedule additional visits    Discharge Status: HEP     Rehab Discharge Reason: Failed to schedule and/or keep follow-up appointment(s)

## 2024-03-05 ENCOUNTER — TELEPHONE (OUTPATIENT)
Dept: PAIN MEDICINE | Facility: CLINIC | Age: 49
End: 2024-03-05

## 2024-03-28 ENCOUNTER — HOSPITAL ENCOUNTER (OUTPATIENT)
Dept: RADIOLOGY | Facility: HOSPITAL | Age: 49
Discharge: HOME | End: 2024-03-28
Payer: COMMERCIAL

## 2024-03-28 ENCOUNTER — HOSPITAL ENCOUNTER (OUTPATIENT)
Dept: GASTROENTEROLOGY | Facility: HOSPITAL | Age: 49
Discharge: HOME | End: 2024-03-28
Payer: COMMERCIAL

## 2024-03-28 VITALS
DIASTOLIC BLOOD PRESSURE: 84 MMHG | TEMPERATURE: 97.9 F | WEIGHT: 155 LBS | OXYGEN SATURATION: 99 % | HEART RATE: 90 BPM | HEIGHT: 60 IN | BODY MASS INDEX: 30.43 KG/M2 | RESPIRATION RATE: 16 BRPM | SYSTOLIC BLOOD PRESSURE: 128 MMHG

## 2024-03-28 DIAGNOSIS — M47.816 LUMBAR SPONDYLOSIS: ICD-10-CM

## 2024-03-28 PROCEDURE — 2500000005 HC RX 250 GENERAL PHARMACY W/O HCPCS: Performed by: ANESTHESIOLOGY

## 2024-03-28 PROCEDURE — 64494 INJ PARAVERT F JNT L/S 2 LEV: CPT | Performed by: ANESTHESIOLOGY

## 2024-03-28 PROCEDURE — 64493 INJ PARAVERT F JNT L/S 1 LEV: CPT | Performed by: ANESTHESIOLOGY

## 2024-03-28 PROCEDURE — 64493 INJ PARAVERT F JNT L/S 1 LEV: CPT | Mod: 50 | Performed by: ANESTHESIOLOGY

## 2024-03-28 RX ORDER — BUPIVACAINE HYDROCHLORIDE 5 MG/ML
INJECTION, SOLUTION PERINEURAL AS NEEDED
Status: COMPLETED | OUTPATIENT
Start: 2024-03-28 | End: 2024-03-28

## 2024-03-28 RX ADMIN — BUPIVACAINE HYDROCHLORIDE 3 ML: 5 INJECTION, SOLUTION PERINEURAL at 08:26

## 2024-03-28 ASSESSMENT — PAIN DESCRIPTION - DESCRIPTORS
DESCRIPTORS: ACHING;SHARP
DESCRIPTORS: ACHING

## 2024-03-28 ASSESSMENT — PAIN SCALES - GENERAL
PAINLEVEL_OUTOF10: 5 - MODERATE PAIN
PAINLEVEL_OUTOF10: 8

## 2024-03-28 ASSESSMENT — ENCOUNTER SYMPTOMS
GASTROINTESTINAL NEGATIVE: 1
CARDIOVASCULAR NEGATIVE: 1
NUMBNESS: 1
BACK PAIN: 1
EYES NEGATIVE: 1
HEMATOLOGIC/LYMPHATIC NEGATIVE: 1
NECK PAIN: 1
RESPIRATORY NEGATIVE: 1
CONSTITUTIONAL NEGATIVE: 1
ENDOCRINE NEGATIVE: 1
PSYCHIATRIC NEGATIVE: 1
WEAKNESS: 1

## 2024-03-28 ASSESSMENT — PAIN - FUNCTIONAL ASSESSMENT: PAIN_FUNCTIONAL_ASSESSMENT: 0-10

## 2024-03-28 ASSESSMENT — COLUMBIA-SUICIDE SEVERITY RATING SCALE - C-SSRS
2. HAVE YOU ACTUALLY HAD ANY THOUGHTS OF KILLING YOURSELF?: NO
1. IN THE PAST MONTH, HAVE YOU WISHED YOU WERE DEAD OR WISHED YOU COULD GO TO SLEEP AND NOT WAKE UP?: NO
6. HAVE YOU EVER DONE ANYTHING, STARTED TO DO ANYTHING, OR PREPARED TO DO ANYTHING TO END YOUR LIFE?: NO

## 2024-03-28 NOTE — DISCHARGE INSTRUCTIONS
DISCHARGE INSTRUCTIONS FOR INJECTIONS     You underwent lumbar medial branch nerve blocks today    Aftermost injections, it is recommended that you relax and limit your activity for the remainder of the day unless you have been told otherwise by your pain physician.  You should not drive a car, operate machinery, or make important legal decisions unless otherwise directed by your pain physician.  You may resume your normal activity, including exercise, tomorrow.      Keep a written pain diary of how much pain relief you experienced following the injection procedure and the length of time of pain relief you experienced pain relief. Following diagnostic injections like medial branch nerve blocks, sacroiliac joint blocks, stellate ganglion injections and other blocks, it is very important you record the specific amount of pain relief you experienced immediately after the injectionand how long it lasted. Your doctor will ask you for this information at your follow up visit.     For all injections, please keep the injection site dry and inspect the site for a couple of days. You may remove the Band-Aid the day of the injection at any time.     Some discomfort, bruising or slight swelling may occur at the injection site. This is not abnormal if it occurs.  If needed you may:    -Take over the counter medication such as Tylenol or Motrin.   -Apply an ice pack for 30 minutes, 2 to 3 times a day for the first 24 hours.     You may shower today; no soaking baths, hot tubs, whirlpools or swimming pools for two days.      If you are given steroids in your injection, it may take 3-5 days for the steroid medication to take effect. You may notice a worsening of your symptoms for 1-2 days after the injection. This is not abnormal.  You may use acetaminophen, ibuprofen, or prescription medication that your doctor may have prescribed for you if you need to do so.     A few common side effects of steroids include facial flushing,  sweating, restlessness, irritability,difficulty sleeping, increase in blood sugar, and increased blood pressure. If you have diabetes, please monitor your blood sugar at least once a day for at least 5 days. If you have poorly controlled high blood pressure, monitoryour blood pressure for at least 2 days and contact your primary care physician if these numbers are unusually high for you.      If you take aspirin or non-steroidal anti-inflammatory drugs (examples are Motrin, Advil, ibuprofen, Naprosyn, Voltaren, Relafen, etc.) you may restart these this evening, but stop taking it 3 days before your next appointment, unless instructed otherwiseby your physician.      You do not need to discontinue non-aspirin-containing pain medications prior to an injection (examples: Celebrex, tramadol, hydrocodone and acetaminophen).      If you take a blood thinning medication (Coumadin, Lovenox, Fragmin,Ticlid, Plavix, Pradaxa, etc.), please discuss this with your primary care physician/cardiologist and your pain physician. These medications MUST be discontinued before you can have an injection safely, without the risk of uncontrolled bleeding. If these medications are not discontinued for an appropriate period of time, you will not be able to receivean injection.      If you are taking Coumadin, please have your INR checked the morning of your procedure and bringthe result to your appointment unless otherwise instructed. If your INR is over 1.2, your injection may need to be rescheduled to avoid uncontrolled bleeding from the needle placement.     Call [facility] at [phone] between 8am-4pm Monday - Friday if you are experiencing the following:    If you received an epidural or spinal injection:    -Headache that doesnot go away with medicine, is worse when sitting or standing up, and is greatly relieved upon lying down.   -Severe pain worse than or different than your baseline pain.   -Chills or fever  (101º F or greater).   -Drainage or signs of infection at the injection site     Go directly to the Emergency Department if you are experiencing the following and received an epidural or spinal injection:   -Abrupt weakness or progressive weakness in your legs that starts after you leave the clinic.   -Abrupt severe or worsening numbness in your legs.   -Inability to urinate after the injection or loss of bowel or bladder control without the urge to defecate or urinate.     If you have a clinical question that cannot wait until your next appointment, please call [phone] between 8am-4pm Monday - Friday or send a [employer] message. We do our best to return all non-emergency messages within 24 hours, Monday - Friday. A nurse or physician will return your message.      If you need to cancel an appointment, please call the scheduling staff at [phone] during normal business hours or leave a message at least 24 hours in advance.     If you are going to be sedated for your next procedure, you MUST have responsible adult who can legally drive accompany you home. You cannot eat or drink for eight hours prior to the planned procedure if you are going to receive sedation. You may take your non-blood thinning medications with a small sip of water.

## 2024-03-28 NOTE — H&P
PAIN MANAGEMENT H&P    Date of Service: 3/28/2024  SUBJECTIVE    CHIEF COMPLAINT: LBP    HISTORY OF PRESENT ILLNESS    Meghan Hernández is a 48 y.o. old female with PMH  MDD, RA, ankylosing spondylitis who presents for prognostic medial branch nerve blocks of BL L4-5, L5-S1 facets with local anesthetic only under fluoroscopic guidance to assess candidacy for RFA     Pain and overall medical condition unchanged from previous visit. Pt denies new-onset numbness, weakness, bowel/bladder incontinence.  Pt denies recent infection/abx use, allergy to Latex/iodine/contrast. Patient is currently taking the following blood thinner(s): N/A    REVIEW OF SYSTEMS  Review of Systems   Constitutional: Negative.    HENT: Negative.     Eyes: Negative.    Respiratory: Negative.     Cardiovascular: Negative.    Gastrointestinal: Negative.    Endocrine: Negative.    Musculoskeletal:  Positive for back pain and neck pain.   Skin: Negative.    Neurological:  Positive for weakness and numbness.   Hematological: Negative.    Psychiatric/Behavioral: Negative.         PAST MEDICAL HISTORY  Past Medical History:   Diagnosis Date    AS (ankylosing spondylitis) (CMS/HCC)     Rheumatoid arthritis (CMS/HCC)      Past Surgical History:   Procedure Laterality Date    COLONOSCOPY      DILATION AND CURETTAGE OF UTERUS      ENDOSCOPY PROCEDURE NOT PERFORMED       Family History   Problem Relation Name Age of Onset    Thyroid disease Mother      Colon cancer Father      Thyroid disease Sister      Cerebral palsy Brother          1/2 brother    Stomach cancer Maternal Grandmother      Breast cancer Other AUNT     Heart attack Other UNCLE        CURRENT MEDICATIONS  Current Outpatient Medications   Medication Sig Dispense Refill    acetaminophen (TylenoL) 325 mg tablet Take 2 tablets (650 mg) by mouth every 6 hours if needed for mild pain (1 - 3).      albuterol 90 mcg/actuation inhaler Inhale 2 puffs every 6 hours if needed for wheezing or shortness of  "breath. 8 g 1    cyanocobalamin (Vitamin B-12) 100 mcg tablet Take by mouth. As directed      DULoxetine (Cymbalta) 60 mg DR capsule Take 1 capsule (60 mg) by mouth once daily. 90 capsule 1    ibuprofen 600 mg tablet Take 1 tablet (600 mg) by mouth every 6 hours if needed for mild pain (1 - 3). FOR PATIENT RECEIVING KETOROLAC.  DO NO GIVE IBUPROFEN FOR 6 HRS FOLLOWING KETOROLAC ADMIN.  NOT TO EXCEED 5 TABLES IN 24 HOURS      methocarbamol (Robaxin) 750 mg tablet Take 1 tablet (750 mg) by mouth 4 times a day as needed for muscle spasms. 360 tablet 2    norethindrone-e.estradioL-iron (Lo Loestrin) 1 mg-10 mcg (24)/10 mcg (2) tablet Take 1 tablet by mouth once daily. 84 tablet 3    pregabalin (Lyrica) 100 mg capsule Take 1 capsule (100 mg) by mouth 2 times a day. 60 capsule 0    benzonatate (Tessalon) 200 mg capsule Take 1 capsule (200 mg) by mouth 3 times a day as needed for cough.      Cosentyx Pen 150 mg/mL self-injector pen       doxycycline (Vibra-Tabs) 100 mg tablet Take 1 tablet (100 mg) by mouth once daily.       No current facility-administered medications for this encounter.       ALLERGIES AND DRUG REACTIONS  No Known Allergies       OBJECTIVE  Visit Vitals  LMP 07/01/2023 (Approximate) Comment: signed declination form   OB Status Perimenopausal   Smoking Status Former     General: Lying comfortably in bed, NAD  Head: NCAT  Eyes: Sclera/conjunctiva clear, EOMI, PERRL  Nose/mouth: MMM  CV: Good distal pulses  Lungs: Good/equal chest excursion  Abdomen: Soft, ND  Ext: No cyanosis/edema  MSK: Able to move extremities  Neuro: AAOx3, grossly normal  Psych: affect nl      REVIEW OF LABORATORY DATA  I have reviewed the following lab results:  No results found for: \"WBC\", \"RBC\", \"HGB\", \"HCT\", \"MCV\", \"MCH\", \"MCHC\", \"RDW\", \"PLT\", \"MPV\"  No results found for: \"NA\", \"K\", \"CO2\", \"BUN\", \"CALCIUM\"  No results found for: \"PROTIME\", \"PTT\", \"INR\", \"FIBRINOGEN\"      REVIEW OF RADIOLOGY DATA  I have reviewed the " following:  Radiology Studies     CT L-spine 12/18/23:  Counting reference: Lumbosacral junction.  Alignment: Alignment is anatomic.  Bone marrow signal/fracture: No evidence of a lytic or blastic  process in the visualized spine. No evidence of acute or chronic  fracture. Paraspinal soft tissues: The paraspinal soft tissues planes  are maintained. Lower thoracic spine: The visualized lower thoracic  bony canal and foramina are patent.      L1-L2: Canal and foramina are patent.      L2-L3: Canal and foramina are patent      L3-L4: Canal and foramina are patent      L4-L5: Canal and foramina are patent      L5-S1: Canal and foramina are patent      Sacrum and iliac wings: The visualized sacrum and iliac wings are  within normal limits.  The presacral soft tissues are normal in  appearance.              MRI c-spine 8/30/23:  There is loss of the normal cervical lordosis. No acute fracture is  identified. There is grade 1 anterolisthesis C4 on C5. There are posterior  osteophytes extending from the C3-C6 levels. No  STIR abnormalities are seen  within the marrow.     The visualized cord signal is grossly unremarkable.     C2-C3: No disc herniation. No significant canal or foraminal stenosis.  C3-C4: There is a posterior disc osteophyte complex. No significant canal or  foraminal stenosis.  C4-C5: There is a posterior disc osteophyte complex. No significant canal or  foraminal stenosis.  C5-C6: There is a posterior disc osteophyte complex. There is facet  osteoarthropathy. No significant canal stenosis. Mild right neural foraminal  narrowing.  C6-C7: There is a posterior disc osteophyte complex. There is facet  osteoarthropathy. No significant canal stenosis. Mild right neural foraminal  narrowing.  C7-T1: No disc herniation. No significant canal or foraminal stenosis.     The paravertebral soft tissues are unremarkable.     IMPRESSION:  Mild multilevel cervical spondylosis most severe at C5-C6 and C6-C7  minimally  progressed as compared to the prior examination from 2014.      ASSESSMENT & PLAN  Meghan Hernández is a 48 y.o. old female with PMH MDD, RA, ankylosing spondylitis who presents for prognostic medial branch nerve blocks of BL L4-5, L5-S1 facets with local anesthetic only under fluoroscopic guidance to assess candidacy for RFA     1) LBP  -Low back pain since prior to 2008 with right leg pain, numbness, weakness. There is appreciable weakness in bilateral lower extremities possibly related to deconditioning rather than radiculopathy. Pain, however, may be related to ankylosing spondylitis  -Refractive to Tylenol, NSAIDs, muscle relaxants, Cymbalta, topical lidocaine, chiropractics, >6 w PT. Cannot tolerate return to PT due to pain  -x-ray lumbar spine 5/18/2023: L5-S1 facet arthropathy  -CT L-spine 12/18/23: multilevel spondylosis, mild disc bulging at L4-5, L5-S1 with mild R NFS at L4-5  -Prognostic medial branch nerve blocks of BL L4-5, L5-S1 facets today with local anesthetic only under fluoroscopic guidance to assess candidacy for RFA        Discussed procedure risks/benefits in detail with patient. Pt meets medical necessity for procedure due to failure of conservative measures. Reviewed procedural risks including bleeding, infection, nerve damage, paralysis. Also reviewed mitigating factors such as screening for infection/blood thinner use, sterile precautions, and image-guidance when applicable. All questions answered. Pt/guardian expressed understanding and choose to proceed            Sofiya Mandujano MD  Anesthesiologist & Interventional Pain Physician   [facility] [place]  O: [phone]  F: [phone]  7:53 AM  03/28/24

## 2024-03-28 NOTE — OP NOTE
Procedure: BILATERAl L4-5, L5-S1 medial branch nerve blocks  Location:  [facility]  Proceduralist: Sofiya Mandujano MD  Image guidance: fluoroscopy confirmed    Patient consented for procedure.  Timeout performed.    Patient brought into the procedure suite and positioned in prone position on procedure table.  Patient's low back was prepped with chlorhexidine and draped in sterile fashion.  Using fluoroscopic guidance, left sacral ala identified in AP view.  Using coaxial approach, 3.5 inch 25-gauge needle advanced until bone contacted.  Then, using ipsilateral oblique view, left L4 pedicle, L5 pedicle identified.  3.5 inch 25-gauge needles advanced in coaxial fashion until bone contacted at both levels where superior articular process meets transverse process.  Needle positioning confirmed in AP view.  Sacral ala needle also confirmed using 30 degrees caudad.  After negative aspiration, 0.5 cc 0.5% bupivacaine injected at each level.  Needles restyletted and withdrawn.      Procedure was repeated on contralateral side in similar fashion without issue.      Hemostasis achieved.  Band-Aids placed.  Patient tolerated procedure well and without issue.      Patient was discharged home in stable condition.      Sofiya Mandujano MD  Anesthesiologist & Interventional Pain Physician   [facility] [place]  O: [phone]  F: [phone]  7:56 AM  03/28/24

## 2024-04-23 ENCOUNTER — TELEPHONE (OUTPATIENT)
Dept: PAIN MEDICINE | Facility: CLINIC | Age: 49
End: 2024-04-23
Payer: COMMERCIAL

## 2024-04-30 DIAGNOSIS — F32.A DEPRESSION, UNSPECIFIED DEPRESSION TYPE: ICD-10-CM

## 2024-05-01 DIAGNOSIS — F32.A DEPRESSION, UNSPECIFIED DEPRESSION TYPE: ICD-10-CM

## 2024-05-02 RX ORDER — DULOXETIN HYDROCHLORIDE 60 MG/1
60 CAPSULE, DELAYED RELEASE ORAL DAILY
Qty: 90 CAPSULE | Refills: 0 | Status: SHIPPED | OUTPATIENT
Start: 2024-05-02 | End: 2025-05-02

## 2024-05-06 NOTE — TELEPHONE ENCOUNTER
Rx request received  Pharmacy populated  Last appmt 2/12/24. Patient states she will call back and reschedule physical

## 2024-05-07 RX ORDER — DULOXETIN HYDROCHLORIDE 60 MG/1
60 CAPSULE, DELAYED RELEASE ORAL DAILY
Qty: 90 CAPSULE | Refills: 0 | OUTPATIENT
Start: 2024-05-07

## 2024-05-13 ENCOUNTER — OFFICE VISIT (OUTPATIENT)
Dept: PAIN MEDICINE | Facility: CLINIC | Age: 49
End: 2024-05-13
Payer: COMMERCIAL

## 2024-05-13 VITALS
DIASTOLIC BLOOD PRESSURE: 81 MMHG | WEIGHT: 155 LBS | SYSTOLIC BLOOD PRESSURE: 134 MMHG | BODY MASS INDEX: 30.43 KG/M2 | HEART RATE: 82 BPM | RESPIRATION RATE: 18 BRPM | HEIGHT: 60 IN

## 2024-05-13 DIAGNOSIS — Z79.891 LONG TERM CURRENT USE OF OPIATE ANALGESIC: Primary | ICD-10-CM

## 2024-05-13 DIAGNOSIS — M54.12 RADICULOPATHY, CERVICAL REGION: ICD-10-CM

## 2024-05-13 DIAGNOSIS — G89.29 CHRONIC LOW BACK PAIN, UNSPECIFIED BACK PAIN LATERALITY, UNSPECIFIED WHETHER SCIATICA PRESENT: ICD-10-CM

## 2024-05-13 DIAGNOSIS — M54.50 CHRONIC LOW BACK PAIN, UNSPECIFIED BACK PAIN LATERALITY, UNSPECIFIED WHETHER SCIATICA PRESENT: ICD-10-CM

## 2024-05-13 DIAGNOSIS — M47.816 LUMBAR SPONDYLOSIS: ICD-10-CM

## 2024-05-13 DIAGNOSIS — M46.1 SACROILIITIS (CMS-HCC): ICD-10-CM

## 2024-05-13 DIAGNOSIS — M79.7 FIBROMYALGIA: ICD-10-CM

## 2024-05-13 PROCEDURE — 99214 OFFICE O/P EST MOD 30 MIN: CPT | Performed by: ANESTHESIOLOGY

## 2024-05-13 PROCEDURE — 80346 BENZODIAZEPINES1-12: CPT | Performed by: ANESTHESIOLOGY

## 2024-05-13 PROCEDURE — 82570 ASSAY OF URINE CREATININE: CPT | Mod: 59 | Performed by: ANESTHESIOLOGY

## 2024-05-13 RX ORDER — TRAMADOL HYDROCHLORIDE 50 MG/1
50 TABLET ORAL 3 TIMES DAILY PRN
Qty: 90 TABLET | Refills: 0 | Status: SHIPPED | OUTPATIENT
Start: 2024-05-13 | End: 2024-06-12

## 2024-05-13 ASSESSMENT — ENCOUNTER SYMPTOMS
BACK PAIN: 1
CONSTITUTIONAL NEGATIVE: 1
CARDIOVASCULAR NEGATIVE: 1
NUMBNESS: 1
HEMATOLOGIC/LYMPHATIC NEGATIVE: 1
GASTROINTESTINAL NEGATIVE: 1
NECK PAIN: 1
RESPIRATORY NEGATIVE: 1
PSYCHIATRIC NEGATIVE: 1
WEAKNESS: 1
ENDOCRINE NEGATIVE: 1
EYES NEGATIVE: 1

## 2024-05-13 ASSESSMENT — PATIENT HEALTH QUESTIONNAIRE - PHQ9
2. FEELING DOWN, DEPRESSED OR HOPELESS: NEARLY EVERY DAY
8. MOVING OR SPEAKING SO SLOWLY THAT OTHER PEOPLE COULD HAVE NOTICED. OR THE OPPOSITE, BEING SO FIGETY OR RESTLESS THAT YOU HAVE BEEN MOVING AROUND A LOT MORE THAN USUAL: NEARLY EVERY DAY
5. POOR APPETITE OR OVEREATING: NEARLY EVERY DAY
6. FEELING BAD ABOUT YOURSELF - OR THAT YOU ARE A FAILURE OR HAVE LET YOURSELF OR YOUR FAMILY DOWN: NEARLY EVERY DAY
7. TROUBLE CONCENTRATING ON THINGS, SUCH AS READING THE NEWSPAPER OR WATCHING TELEVISION: NEARLY EVERY DAY
10. IF YOU CHECKED OFF ANY PROBLEMS, HOW DIFFICULT HAVE THESE PROBLEMS MADE IT FOR YOU TO DO YOUR WORK, TAKE CARE OF THINGS AT HOME, OR GET ALONG WITH OTHER PEOPLE: VERY DIFFICULT
3. TROUBLE FALLING OR STAYING ASLEEP OR SLEEPING TOO MUCH: NEARLY EVERY DAY
4. FEELING TIRED OR HAVING LITTLE ENERGY: NEARLY EVERY DAY
SUM OF ALL RESPONSES TO PHQ QUESTIONS 1-9: 24
1. LITTLE INTEREST OR PLEASURE IN DOING THINGS: NEARLY EVERY DAY
SUM OF ALL RESPONSES TO PHQ9 QUESTIONS 1 AND 2: 6
9. THOUGHTS THAT YOU WOULD BE BETTER OFF DEAD, OR OF HURTING YOURSELF: NOT AT ALL

## 2024-05-13 ASSESSMENT — PAIN SCALES - GENERAL
PAINLEVEL_OUTOF10: 8
PAINLEVEL: 8

## 2024-05-13 ASSESSMENT — PAIN - FUNCTIONAL ASSESSMENT: PAIN_FUNCTIONAL_ASSESSMENT: 0-10

## 2024-05-13 ASSESSMENT — PAIN DESCRIPTION - DESCRIPTORS: DESCRIPTORS: ACHING;SHARP;STABBING

## 2024-05-13 NOTE — PROGRESS NOTES
PAIN MANAGEMENT FOLLOW-UP OFFICE NOTE    Date of Service: 5/13/2024    SUBJECTIVE    CHIEF COMPLAINT: neck pain    HISTORY OF PRESENT ILLNESS    Meghan Hernández is a 48 y.o. female with PMH MDD, RA, ankylosing spondylitis who presents for follow-up neck pain.     On 3/28, pt underwent BL LMBNB #1 with 100% relief for 1 d. Since that time, LBP has returned to baseline. She is interested in repeating injection, but forgot to follow-up. Neck pain slowly returning this month. Ran out of Lyrica, but did not request refill as pain did not change    Pt denies new-onset numbness, weakness, bowel/bladder incontinence.  Pt denies recent infection, allergy to Latex/iodine/contrast. Patient is currently taking the following blood thinner(s): N/A    Procedure Log:  -BL LMBNB #1 3/28/24: 100%   -R SIJ CSI 1/25/24: 50% ongoing relief  -C7-T1 GINA 12/28/23: 80% ongoing relief  -C7-T1 GINA 10/26/23: 100% relief RUE, 40% neck    REVIEW OF SYSTEMS  Review of Systems   Constitutional: Negative.    HENT: Negative.     Eyes: Negative.    Respiratory: Negative.     Cardiovascular: Negative.    Gastrointestinal: Negative.    Endocrine: Negative.    Musculoskeletal:  Positive for back pain and neck pain.   Skin: Negative.    Neurological:  Positive for weakness and numbness.   Hematological: Negative.    Psychiatric/Behavioral: Negative.         PAST MEDICAL HISTORY  Past Medical History:   Diagnosis Date    AS (ankylosing spondylitis) (Multi)     Rheumatoid arthritis (Multi)      Past Surgical History:   Procedure Laterality Date    COLONOSCOPY      DILATION AND CURETTAGE OF UTERUS      ENDOSCOPY PROCEDURE NOT PERFORMED       Family History   Problem Relation Name Age of Onset    Thyroid disease Mother      Colon cancer Father      Thyroid disease Sister      Cerebral palsy Brother          1/2 brother    Stomach cancer Maternal Grandmother      Breast cancer Other AUNT     Heart attack Other UNCLE        CURRENT MEDICATIONS  Current Outpatient  Medications   Medication Sig Dispense Refill    acetaminophen (TylenoL) 325 mg tablet Take 2 tablets (650 mg) by mouth every 6 hours if needed for mild pain (1 - 3).      albuterol 90 mcg/actuation inhaler Inhale 2 puffs every 6 hours if needed for wheezing or shortness of breath. 8 g 1    Cosentyx Pen 150 mg/mL self-injector pen       cyanocobalamin (Vitamin B-12) 100 mcg tablet Take by mouth. As directed      DULoxetine (Cymbalta) 60 mg DR capsule Take 1 capsule (60 mg) by mouth once daily. 90 capsule 0    ibuprofen 600 mg tablet Take 1 tablet (600 mg) by mouth every 6 hours if needed for mild pain (1 - 3). FOR PATIENT RECEIVING KETOROLAC.  DO NO GIVE IBUPROFEN FOR 6 HRS FOLLOWING KETOROLAC ADMIN.  NOT TO EXCEED 5 TABLES IN 24 HOURS      methocarbamol (Robaxin) 750 mg tablet Take 1 tablet (750 mg) by mouth 4 times a day as needed for muscle spasms. 360 tablet 2    norethindrone-e.estradioL-iron (Lo Loestrin) 1 mg-10 mcg (24)/10 mcg (2) tablet Take 1 tablet by mouth once daily. 84 tablet 3    benzonatate (Tessalon) 200 mg capsule Take 1 capsule (200 mg) by mouth 3 times a day as needed for cough.      doxycycline (Vibra-Tabs) 100 mg tablet Take 1 tablet (100 mg) by mouth once daily.      pregabalin (Lyrica) 100 mg capsule Take 1 capsule (100 mg) by mouth 2 times a day. 60 capsule 0     No current facility-administered medications for this visit.       ALLERGIES AND DRUG REACTIONS  No Known Allergies       OBJECTIVE  Visit Vitals  /81   Pulse 82   Resp 18   Ht 1.524 m (5')   Wt 70.3 kg (155 lb)   BMI 30.27 kg/m²   OB Status Perimenopausal   Smoking Status Former   BSA 1.73 m²       Last Recorded Pain Score (if available):                Physical Exam  Vitals and nursing note reviewed.     General: Sitting in chair, NAD  Head: NCAT  Eyes: Sclera/conjunctiva clear, EOMI, PERRL  Nose/mouth: MMM  CV: palpable radial pulse RRR  Lungs: Good/equal chest excursion  Abdomen: Soft, ND  Ext: No cyanosis/edema  MSK: pain  "on lumbar extension with +facet-loading BL    Neuro: AAOx3   Psych: affect nl  Skin: no rash/lesions      REVIEW OF LABORATORY DATA  I have reviewed the following lab results:  WBC   Date Value Ref Range Status   09/07/2021 6.5 4.5 - 11.0 K/UL Final     RBC   Date Value Ref Range Status   09/07/2021 4.48 4.0 - 4.9 M/UL Final     Hemoglobin   Date Value Ref Range Status   09/07/2021 13.1 12.0 - 15.0 GM/DL Final     Hematocrit   Date Value Ref Range Status   09/07/2021 41.5 36 - 44 % Final     MCV   Date Value Ref Range Status   09/07/2021 92.6 80 - 100 FL Final     MCH   Date Value Ref Range Status   09/07/2021 29.2 26 - 34 PG Final     MCHC   Date Value Ref Range Status   09/07/2021 31.6 31 - 37 % Final     Platelets   Date Value Ref Range Status   09/07/2021 246 150 - 450 K/UL Final     MPV   Date Value Ref Range Status   09/07/2021 11.0 7.0 - 12.6 CU Final     Sodium   Date Value Ref Range Status   09/07/2021 140 133 - 145 MMOL/L Final     Potassium   Date Value Ref Range Status   09/07/2021 4.2 3.4 - 5.1 MMOL/L Final     Bicarbonate   Date Value Ref Range Status   09/07/2021 24 24 - 31 MMOL/L Final     Urea Nitrogen   Date Value Ref Range Status   09/07/2021 4 (L) 8 - 25 MG/DL Final     Calcium   Date Value Ref Range Status   09/07/2021 9.4 8.5 - 10.4 MG/DL Final     No results found for: \"PROTIME\", \"PTT\", \"INR\", \"FIBRINOGEN\"      REVIEW OF RADIOLOGY   I have reviewed the following:  Radiology Studies           MRI c-spine 8/30/23:  There is loss of the normal cervical lordosis. No acute fracture is  identified. There is grade 1 anterolisthesis C4 on C5. There are posterior  osteophytes extending from the C3-C6 levels. No  STIR abnormalities are seen  within the marrow.     The visualized cord signal is grossly unremarkable.     C2-C3: No disc herniation. No significant canal or foraminal stenosis.  C3-C4: There is a posterior disc osteophyte complex. No significant canal or  foraminal stenosis.  C4-C5: There is " a posterior disc osteophyte complex. No significant canal or  foraminal stenosis.  C5-C6: There is a posterior disc osteophyte complex. There is facet  osteoarthropathy. No significant canal stenosis. Mild right neural foraminal  narrowing.  C6-C7: There is a posterior disc osteophyte complex. There is facet  osteoarthropathy. No significant canal stenosis. Mild right neural foraminal  narrowing.  C7-T1: No disc herniation. No significant canal or foraminal stenosis.     The paravertebral soft tissues are unremarkable.     IMPRESSION:  Mild multilevel cervical spondylosis most severe at C5-C6 and C6-C7  minimally progressed as compared to the prior examination from 2014.            ASSESSMENT & PLAN  Meghan Hernández is a 48 y.o. old female with PMH MDD, RA, ankylosing spondylitis who presents for F/U neck pain    1) Cervical radic  -Neck pain since 2021 radiating to right upper extremity with subjective fourth and fifth digit numbness, tingling, weakness. Possible contribution from rheumatoid arthritis, ankylosing spondylitis  -Refractive to Tylenol, NSAIDs, muscle relaxants, Cymbalta, topical lidocaine, >6 w PT  -MRI C-spine 8/30/2023: Multilevel spondylosis featuring grade 1 C4-5 listhesis, severe facet arthropathy at C5-6 and C6-7 with right neuroforaminal stenosis  -C7-T1 GINA 12/28/23: 80% relief 4 mo, now diminishing  -Considering repeat     2) LBP  -Low back pain since prior to 2008 with right leg pain, numbness, weakness. There is appreciable weakness in bilateral lower extremities possibly related to deconditioning rather than radiculopathy. Pain, however, may be related to ankylosing spondylitis  -Refractive to Tylenol, NSAIDs, muscle relaxants, Cymbalta, topical lidocaine, chiropractics, >6 w PT. Cannot tolerate return to PT due to pain  -x-ray lumbar spine 5/18/2023: L5-S1 facet arthropathy  -CT L-spine 12/18/23: mild disc bulging at L4-5, L5-S1 with mild R NFS at L4-5  -BL LMBNB #1 on 3/28/24: 100% relief 1  d  -Schedule 2nd prognostic medial branch nerve blocks of BL L4-5, L5-S1 facets with local anesthetic only under fluoroscopic guidance to assess candidacy for RFA    3) Sacroiliitis  -R-sided LBP reproduced on numerous R SIJ-provocative maneuvers on exam  -R SIJ CSI 1/25/24: 50% ongoing relief    3) Fibromyalgia  -Diffuse whole body pain for yrs on hx RA and ankylosing spondylitis on duloxetine  -Refractive to Tylenol, NSAIDs, muscle relaxants, Cymbalta, topical lidocaine, chiropractics, >6 w PT, gabapentin, pregabalin  -Cont methocarbamol 750 mg QID PRN        Discussed procedure risks/benefits in detail with patient. Pt meets medical necessity for procedure due to failure of conservative measures. Reviewed procedural risks including bleeding, infection, nerve damage, paralysis. Also reviewed mitigating factors such as screening for infection/blood thinner use, sterile precautions, and image-guidance when applicable. All questions answered. Pt/guardian expressed understanding and choose to proceed                Sofiya Mandujano MD  Anesthesiologist & Interventional Pain Physician   [facility] [place]  O: [phone]  F: [phone]  3:44 PM  05/13/24

## 2024-05-13 NOTE — H&P (VIEW-ONLY)
PAIN MANAGEMENT FOLLOW-UP OFFICE NOTE    Date of Service: 5/13/2024    SUBJECTIVE    CHIEF COMPLAINT: neck pain    HISTORY OF PRESENT ILLNESS    Meghan Hernández is a 48 y.o. female with PMH MDD, RA, ankylosing spondylitis who presents for follow-up neck pain.     On 3/28, pt underwent BL LMBNB #1 with 100% relief for 1 d. Since that time, LBP has returned to baseline. She is interested in repeating injection, but forgot to follow-up. Neck pain slowly returning this month. Ran out of Lyrica, but did not request refill as pain did not change    Pt denies new-onset numbness, weakness, bowel/bladder incontinence.  Pt denies recent infection, allergy to Latex/iodine/contrast. Patient is currently taking the following blood thinner(s): N/A    Procedure Log:  -BL LMBNB #1 3/28/24: 100%   -R SIJ CSI 1/25/24: 50% ongoing relief  -C7-T1 GINA 12/28/23: 80% ongoing relief  -C7-T1 GINA 10/26/23: 100% relief RUE, 40% neck    REVIEW OF SYSTEMS  Review of Systems   Constitutional: Negative.    HENT: Negative.     Eyes: Negative.    Respiratory: Negative.     Cardiovascular: Negative.    Gastrointestinal: Negative.    Endocrine: Negative.    Musculoskeletal:  Positive for back pain and neck pain.   Skin: Negative.    Neurological:  Positive for weakness and numbness.   Hematological: Negative.    Psychiatric/Behavioral: Negative.         PAST MEDICAL HISTORY  Past Medical History:   Diagnosis Date    AS (ankylosing spondylitis) (Multi)     Rheumatoid arthritis (Multi)      Past Surgical History:   Procedure Laterality Date    COLONOSCOPY      DILATION AND CURETTAGE OF UTERUS      ENDOSCOPY PROCEDURE NOT PERFORMED       Family History   Problem Relation Name Age of Onset    Thyroid disease Mother      Colon cancer Father      Thyroid disease Sister      Cerebral palsy Brother          1/2 brother    Stomach cancer Maternal Grandmother      Breast cancer Other AUNT     Heart attack Other UNCLE        CURRENT MEDICATIONS  Current Outpatient  Medications   Medication Sig Dispense Refill    acetaminophen (TylenoL) 325 mg tablet Take 2 tablets (650 mg) by mouth every 6 hours if needed for mild pain (1 - 3).      albuterol 90 mcg/actuation inhaler Inhale 2 puffs every 6 hours if needed for wheezing or shortness of breath. 8 g 1    Cosentyx Pen 150 mg/mL self-injector pen       cyanocobalamin (Vitamin B-12) 100 mcg tablet Take by mouth. As directed      DULoxetine (Cymbalta) 60 mg DR capsule Take 1 capsule (60 mg) by mouth once daily. 90 capsule 0    ibuprofen 600 mg tablet Take 1 tablet (600 mg) by mouth every 6 hours if needed for mild pain (1 - 3). FOR PATIENT RECEIVING KETOROLAC.  DO NO GIVE IBUPROFEN FOR 6 HRS FOLLOWING KETOROLAC ADMIN.  NOT TO EXCEED 5 TABLES IN 24 HOURS      methocarbamol (Robaxin) 750 mg tablet Take 1 tablet (750 mg) by mouth 4 times a day as needed for muscle spasms. 360 tablet 2    norethindrone-e.estradioL-iron (Lo Loestrin) 1 mg-10 mcg (24)/10 mcg (2) tablet Take 1 tablet by mouth once daily. 84 tablet 3    benzonatate (Tessalon) 200 mg capsule Take 1 capsule (200 mg) by mouth 3 times a day as needed for cough.      doxycycline (Vibra-Tabs) 100 mg tablet Take 1 tablet (100 mg) by mouth once daily.      pregabalin (Lyrica) 100 mg capsule Take 1 capsule (100 mg) by mouth 2 times a day. 60 capsule 0     No current facility-administered medications for this visit.       ALLERGIES AND DRUG REACTIONS  No Known Allergies       OBJECTIVE  Visit Vitals  /81   Pulse 82   Resp 18   Ht 1.524 m (5')   Wt 70.3 kg (155 lb)   BMI 30.27 kg/m²   OB Status Perimenopausal   Smoking Status Former   BSA 1.73 m²       Last Recorded Pain Score (if available):                Physical Exam  Vitals and nursing note reviewed.     General: Sitting in chair, NAD  Head: NCAT  Eyes: Sclera/conjunctiva clear, EOMI, PERRL  Nose/mouth: MMM  CV: palpable radial pulse RRR  Lungs: Good/equal chest excursion  Abdomen: Soft, ND  Ext: No cyanosis/edema  MSK: pain  "on lumbar extension with +facet-loading BL    Neuro: AAOx3   Psych: affect nl  Skin: no rash/lesions      REVIEW OF LABORATORY DATA  I have reviewed the following lab results:  WBC   Date Value Ref Range Status   09/07/2021 6.5 4.5 - 11.0 K/UL Final     RBC   Date Value Ref Range Status   09/07/2021 4.48 4.0 - 4.9 M/UL Final     Hemoglobin   Date Value Ref Range Status   09/07/2021 13.1 12.0 - 15.0 GM/DL Final     Hematocrit   Date Value Ref Range Status   09/07/2021 41.5 36 - 44 % Final     MCV   Date Value Ref Range Status   09/07/2021 92.6 80 - 100 FL Final     MCH   Date Value Ref Range Status   09/07/2021 29.2 26 - 34 PG Final     MCHC   Date Value Ref Range Status   09/07/2021 31.6 31 - 37 % Final     Platelets   Date Value Ref Range Status   09/07/2021 246 150 - 450 K/UL Final     MPV   Date Value Ref Range Status   09/07/2021 11.0 7.0 - 12.6 CU Final     Sodium   Date Value Ref Range Status   09/07/2021 140 133 - 145 MMOL/L Final     Potassium   Date Value Ref Range Status   09/07/2021 4.2 3.4 - 5.1 MMOL/L Final     Bicarbonate   Date Value Ref Range Status   09/07/2021 24 24 - 31 MMOL/L Final     Urea Nitrogen   Date Value Ref Range Status   09/07/2021 4 (L) 8 - 25 MG/DL Final     Calcium   Date Value Ref Range Status   09/07/2021 9.4 8.5 - 10.4 MG/DL Final     No results found for: \"PROTIME\", \"PTT\", \"INR\", \"FIBRINOGEN\"      REVIEW OF RADIOLOGY   I have reviewed the following:  Radiology Studies           MRI c-spine 8/30/23:  There is loss of the normal cervical lordosis. No acute fracture is  identified. There is grade 1 anterolisthesis C4 on C5. There are posterior  osteophytes extending from the C3-C6 levels. No  STIR abnormalities are seen  within the marrow.     The visualized cord signal is grossly unremarkable.     C2-C3: No disc herniation. No significant canal or foraminal stenosis.  C3-C4: There is a posterior disc osteophyte complex. No significant canal or  foraminal stenosis.  C4-C5: There is " a posterior disc osteophyte complex. No significant canal or  foraminal stenosis.  C5-C6: There is a posterior disc osteophyte complex. There is facet  osteoarthropathy. No significant canal stenosis. Mild right neural foraminal  narrowing.  C6-C7: There is a posterior disc osteophyte complex. There is facet  osteoarthropathy. No significant canal stenosis. Mild right neural foraminal  narrowing.  C7-T1: No disc herniation. No significant canal or foraminal stenosis.     The paravertebral soft tissues are unremarkable.     IMPRESSION:  Mild multilevel cervical spondylosis most severe at C5-C6 and C6-C7  minimally progressed as compared to the prior examination from 2014.            ASSESSMENT & PLAN  Meghan Hernández is a 48 y.o. old female with PMH MDD, RA, ankylosing spondylitis who presents for F/U neck pain    1) Cervical radic  -Neck pain since 2021 radiating to right upper extremity with subjective fourth and fifth digit numbness, tingling, weakness. Possible contribution from rheumatoid arthritis, ankylosing spondylitis  -Refractive to Tylenol, NSAIDs, muscle relaxants, Cymbalta, topical lidocaine, >6 w PT  -MRI C-spine 8/30/2023: Multilevel spondylosis featuring grade 1 C4-5 listhesis, severe facet arthropathy at C5-6 and C6-7 with right neuroforaminal stenosis  -C7-T1 GINA 12/28/23: 80% relief 4 mo, now diminishing  -Considering repeat     2) LBP  -Low back pain since prior to 2008 with right leg pain, numbness, weakness. There is appreciable weakness in bilateral lower extremities possibly related to deconditioning rather than radiculopathy. Pain, however, may be related to ankylosing spondylitis  -Refractive to Tylenol, NSAIDs, muscle relaxants, Cymbalta, topical lidocaine, chiropractics, >6 w PT. Cannot tolerate return to PT due to pain  -x-ray lumbar spine 5/18/2023: L5-S1 facet arthropathy  -CT L-spine 12/18/23: mild disc bulging at L4-5, L5-S1 with mild R NFS at L4-5  -BL LMBNB #1 on 3/28/24: 100% relief 1  d  -Schedule 2nd prognostic medial branch nerve blocks of BL L4-5, L5-S1 facets with local anesthetic only under fluoroscopic guidance to assess candidacy for RFA    3) Sacroiliitis  -R-sided LBP reproduced on numerous R SIJ-provocative maneuvers on exam  -R SIJ CSI 1/25/24: 50% ongoing relief    3) Fibromyalgia  -Diffuse whole body pain for yrs on hx RA and ankylosing spondylitis on duloxetine  -Refractive to Tylenol, NSAIDs, muscle relaxants, Cymbalta, topical lidocaine, chiropractics, >6 w PT, gabapentin, pregabalin  -Cont methocarbamol 750 mg QID PRN        Discussed procedure risks/benefits in detail with patient. Pt meets medical necessity for procedure due to failure of conservative measures. Reviewed procedural risks including bleeding, infection, nerve damage, paralysis. Also reviewed mitigating factors such as screening for infection/blood thinner use, sterile precautions, and image-guidance when applicable. All questions answered. Pt/guardian expressed understanding and choose to proceed                Sofiya Mandujano MD  Anesthesiologist & Interventional Pain Physician   [facility] [place]  O: [phone]  F: [phone]  3:44 PM  05/13/24

## 2024-05-14 ENCOUNTER — APPOINTMENT (OUTPATIENT)
Dept: PRIMARY CARE | Facility: CLINIC | Age: 49
End: 2024-05-14
Payer: COMMERCIAL

## 2024-05-14 LAB
AMPHETAMINES UR QL SCN: NORMAL
BARBITURATES UR QL SCN: NORMAL
BZE UR QL SCN: NORMAL
CANNABINOIDS UR QL SCN: NORMAL
CREAT UR-MCNC: 29.1 MG/DL (ref 20–320)
PCP UR QL SCN: NORMAL

## 2024-05-30 ENCOUNTER — HOSPITAL ENCOUNTER (OUTPATIENT)
Dept: GASTROENTEROLOGY | Facility: HOSPITAL | Age: 49
Discharge: HOME | End: 2024-05-30
Payer: COMMERCIAL

## 2024-05-30 ENCOUNTER — HOSPITAL ENCOUNTER (OUTPATIENT)
Dept: RADIOLOGY | Facility: HOSPITAL | Age: 49
Discharge: HOME | End: 2024-05-30
Payer: COMMERCIAL

## 2024-05-30 VITALS
DIASTOLIC BLOOD PRESSURE: 75 MMHG | HEART RATE: 78 BPM | OXYGEN SATURATION: 100 % | SYSTOLIC BLOOD PRESSURE: 111 MMHG | TEMPERATURE: 96.8 F | RESPIRATION RATE: 16 BRPM

## 2024-05-30 DIAGNOSIS — M47.816 LUMBAR SPONDYLOSIS: ICD-10-CM

## 2024-05-30 PROCEDURE — 2500000004 HC RX 250 GENERAL PHARMACY W/ HCPCS (ALT 636 FOR OP/ED): Performed by: ANESTHESIOLOGY

## 2024-05-30 PROCEDURE — 2500000005 HC RX 250 GENERAL PHARMACY W/O HCPCS: Performed by: ANESTHESIOLOGY

## 2024-05-30 PROCEDURE — 99152 MOD SED SAME PHYS/QHP 5/>YRS: CPT | Performed by: ANESTHESIOLOGY

## 2024-05-30 PROCEDURE — 64493 INJ PARAVERT F JNT L/S 1 LEV: CPT | Mod: 50 | Performed by: ANESTHESIOLOGY

## 2024-05-30 PROCEDURE — 64493 INJ PARAVERT F JNT L/S 1 LEV: CPT | Performed by: ANESTHESIOLOGY

## 2024-05-30 PROCEDURE — 64494 INJ PARAVERT F JNT L/S 2 LEV: CPT | Performed by: ANESTHESIOLOGY

## 2024-05-30 PROCEDURE — 7100000009 HC PHASE TWO TIME - INITIAL BASE CHARGE

## 2024-05-30 PROCEDURE — 7100000010 HC PHASE TWO TIME - EACH INCREMENTAL 1 MINUTE

## 2024-05-30 PROCEDURE — 64494 INJ PARAVERT F JNT L/S 2 LEV: CPT | Mod: 50 | Performed by: ANESTHESIOLOGY

## 2024-05-30 RX ORDER — SODIUM CHLORIDE, SODIUM LACTATE, POTASSIUM CHLORIDE, CALCIUM CHLORIDE 600; 310; 30; 20 MG/100ML; MG/100ML; MG/100ML; MG/100ML
100 INJECTION, SOLUTION INTRAVENOUS CONTINUOUS
Status: DISCONTINUED | OUTPATIENT
Start: 2024-05-30 | End: 2024-05-31 | Stop reason: HOSPADM

## 2024-05-30 RX ORDER — BUPIVACAINE HYDROCHLORIDE 5 MG/ML
INJECTION, SOLUTION PERINEURAL AS NEEDED
Status: COMPLETED | OUTPATIENT
Start: 2024-05-30 | End: 2024-05-30

## 2024-05-30 RX ORDER — MIDAZOLAM HYDROCHLORIDE 1 MG/ML
INJECTION, SOLUTION INTRAMUSCULAR; INTRAVENOUS AS NEEDED
Status: COMPLETED | OUTPATIENT
Start: 2024-05-30 | End: 2024-05-30

## 2024-05-30 RX ORDER — FENTANYL CITRATE 50 UG/ML
100 INJECTION, SOLUTION INTRAMUSCULAR; INTRAVENOUS ONCE
Status: DISCONTINUED | OUTPATIENT
Start: 2024-05-30 | End: 2024-05-31 | Stop reason: HOSPADM

## 2024-05-30 RX ORDER — MIDAZOLAM HYDROCHLORIDE 1 MG/ML
2 INJECTION, SOLUTION INTRAMUSCULAR; INTRAVENOUS ONCE
Status: COMPLETED | OUTPATIENT
Start: 2024-05-30 | End: 2024-05-30

## 2024-05-30 RX ADMIN — MIDAZOLAM 1 MG: 1 INJECTION INTRAMUSCULAR; INTRAVENOUS at 08:35

## 2024-05-30 RX ADMIN — MIDAZOLAM HYDROCHLORIDE 2 MG: 1 INJECTION, SOLUTION INTRAMUSCULAR; INTRAVENOUS at 08:29

## 2024-05-30 RX ADMIN — BUPIVACAINE HYDROCHLORIDE 3 ML: 5 INJECTION, SOLUTION PERINEURAL at 08:34

## 2024-05-30 RX ADMIN — MIDAZOLAM 1 MG: 1 INJECTION INTRAMUSCULAR; INTRAVENOUS at 08:34

## 2024-05-30 ASSESSMENT — PAIN SCALES - GENERAL
PAINLEVEL_OUTOF10: 5 - MODERATE PAIN
PAINLEVEL_OUTOF10: 3
PAINLEVEL_OUTOF10: 7
PAINLEVEL_OUTOF10: 7

## 2024-05-30 ASSESSMENT — PAIN - FUNCTIONAL ASSESSMENT
PAIN_FUNCTIONAL_ASSESSMENT: 0-10

## 2024-05-30 ASSESSMENT — PAIN DESCRIPTION - DESCRIPTORS
DESCRIPTORS: ACHING
DESCRIPTORS: ACHING
DESCRIPTORS: SHARP;ACHING

## 2024-05-30 NOTE — OP NOTE
Procedure: BILATERAL L4-5, L5-S1 medial branch nerve blocks  Location:  [facility]  Proceduralist: Sofiya Mandujano MD  Image guidance: fluoroscopy confirmed    Patient consented for procedure.  Timeout performed.    Patient brought into the procedure suite and positioned in prone position on procedure table.  Patient's low back was prepped with chlorhexidine and draped in sterile fashion.  Using fluoroscopic guidance, left sacral ala identified in AP view.  Using coaxial approach, 3.5 inch 25-gauge needle advanced until bone contacted.  Then, using ipsilateral oblique view, left L4 pedicle, L5 pedicle identified.  3.5 inch 25-gauge needles advanced in coaxial fashion until bone contacted at both levels where superior articular process meets transverse process.  Needle positioning confirmed in AP view.  Sacral ala needle also confirmed using 30 degrees caudad.  After negative aspiration, 0.5 cc 0.5% bupivacaine injected at each level.  Needles restyletted and withdrawn.      Procedure was repeated on contralateral side in similar fashion without issue.      Hemostasis achieved.  Band-Aids placed.  Patient tolerated procedure well and without issue.      See nursing details for VS/sedation details.     Patient was discharged home in stable condition.      Sofiya Mandujano MD  Anesthesiologist & Interventional Pain Physician   [facility] [place]  O: [phone]  F: [phone]  8:05 AM  05/30/24

## 2024-05-30 NOTE — INTERVAL H&P NOTE
H&P reviewed. The patient was examined and there are no changes to the H&P.  Meghan Hernández is a 48 y.o. female with PMH MDD, RA, ankylosing spondylitis who presents for  2nd prognostic medial branch nerve blocks of BL L4-5, L5-S1 facets with local anesthetic only under fluoroscopic guidance to assess candidacy for RFA. Patient's pain stable and persistent from last visit.  Appropriately NPO.  No personal/family hx issues with anesthesia. Denies allergies to Latex, steroids, local anesthetics, or iodine/contrast. Denies being on blood thinners. Not diabetic.  Denies fever, chills, NS, CP, SOB, cough, N/V.    Discussed procedure risks/benefits in detail with patient. Pt meets medical necessity for procedure due to failure of conservative measures. Reviewed procedural risks including bleeding, infection, nerve damage, paralysis. Also reviewed mitigating factors such as screening for infection/blood thinner use, sterile precautions, and image-guidance when applicable. All questions answered. Pt/guardian expressed understanding and choose to proceed      Sofiya Mandujano MD  Anesthesiologist & Interventional Pain Physician   [facility] [place]  O: [phone]  F: [phone]  8:02 AM  05/30/24

## 2024-05-30 NOTE — DISCHARGE INSTRUCTIONS
DISCHARGE INSTRUCTIONS FOR INJECTIONS     You underwent lumbar medial branch nerve blocks today    Aftermost injections, it is recommended that you relax and limit your activity for the remainder of the day unless you have been told otherwise by your pain physician.  You should not drive a car, operate machinery, or make important legal decisions unless otherwise directed by your pain physician.  You may resume your normal activity, including exercise, tomorrow.      Keep a written pain diary of how much pain relief you experienced following the injection procedure and the length of time of pain relief you experienced pain relief. Following diagnostic injections like medial branch nerve blocks, sacroiliac joint blocks, stellate ganglion injections and other blocks, it is very important you record the specific amount of pain relief you experienced immediately after the injectionand how long it lasted. Your doctor will ask you for this information at your follow up visit.     For all injections, please keep the injection site dry and inspect the site for a couple of days. You may remove the Band-Aid the day of the injection at any time.     Some discomfort, bruising or slight swelling may occur at the injection site. This is not abnormal if it occurs.  If needed you may:    -Take over the counter medication such as Tylenol or Motrin.   -Apply an ice pack for 30 minutes, 2 to 3 times a day for the first 24 hours.     You may shower today; no soaking baths, hot tubs, whirlpools or swimming pools for two days.      If you are given steroids in your injection, it may take 3-5 days for the steroid medication to take effect. You may notice a worsening of your symptoms for 1-2 days after the injection. This is not abnormal.  You may use acetaminophen, ibuprofen, or prescription medication that your doctor may have prescribed for you if you need to do so.     A few common side effects of steroids include facial flushing,  sweating, restlessness, irritability,difficulty sleeping, increase in blood sugar, and increased blood pressure. If you have diabetes, please monitor your blood sugar at least once a day for at least 5 days. If you have poorly controlled high blood pressure, monitoryour blood pressure for at least 2 days and contact your primary care physician if these numbers are unusually high for you.      If you take aspirin or non-steroidal anti-inflammatory drugs (examples are Motrin, Advil, ibuprofen, Naprosyn, Voltaren, Relafen, etc.) you may restart these this evening, but stop taking it 3 days before your next appointment, unless instructed otherwiseby your physician.      You do not need to discontinue non-aspirin-containing pain medications prior to an injection (examples: Celebrex, tramadol, hydrocodone and acetaminophen).      If you take a blood thinning medication (Coumadin, Lovenox, Fragmin,Ticlid, Plavix, Pradaxa, etc.), please discuss this with your primary care physician/cardiologist and your pain physician. These medications MUST be discontinued before you can have an injection safely, without the risk of uncontrolled bleeding. If these medications are not discontinued for an appropriate period of time, you will not be able to receivean injection.      If you are taking Coumadin, please have your INR checked the morning of your procedure and bringthe result to your appointment unless otherwise instructed. If your INR is over 1.2, your injection may need to be rescheduled to avoid uncontrolled bleeding from the needle placement.     Call [facility] at [phone] between 8am-4pm Monday - Friday if you are experiencing the following:    If you received an epidural or spinal injection:    -Headache that doesnot go away with medicine, is worse when sitting or standing up, and is greatly relieved upon lying down.   -Severe pain worse than or different than your baseline pain.   -Chills or fever  (101º F or greater).   -Drainage or signs of infection at the injection site     Go directly to the Emergency Department if you are experiencing the following and received an epidural or spinal injection:   -Abrupt weakness or progressive weakness in your legs that starts after you leave the clinic.   -Abrupt severe or worsening numbness in your legs.   -Inability to urinate after the injection or loss of bowel or bladder control without the urge to defecate or urinate.     If you have a clinical question that cannot wait until your next appointment, please call [phone] between 8am-4pm Monday - Friday or send a [facility] message. We do our best to return all non-emergency messages within 24 hours, Monday - Friday. A nurse or physician will return your message.      If you need to cancel an appointment, please call the scheduling staff at [phone] during normal business hours or leave a message at least 24 hours in advance.     If you are going to be sedated for your next procedure, you MUST have responsible adult who can legally drive accompany you home. You cannot eat or drink for eight hours prior to the planned procedure if you are going to receive sedation. You may take your non-blood thinning medications with a small sip of water.

## 2024-05-30 NOTE — PERIOPERATIVE NURSING NOTE
0841- Arrived to rm 24 via cart with RN. Rates pain 5/10,tolerable. Drowsy on and off, repositioned with HOB elevated, drink and snack. Friend at bedside. 2 low back band aids dry and intact. VSS. Awaiting discharge orders. Call light within reach.  0905- Tolerating PO. Remaining awake. VSS. IV discontinued. Reviewed discharged instructions, verbalized understanding.

## 2024-06-10 ENCOUNTER — TELEPHONE (OUTPATIENT)
Dept: PAIN MEDICINE | Facility: CLINIC | Age: 49
End: 2024-06-10
Payer: COMMERCIAL

## 2024-06-10 NOTE — TELEPHONE ENCOUNTER
Pt calls asking for refill of Tramadol. Call back to patient and noted that she has a TELEV with Camryn Adkins on Wed 06/12/2024 and she can fill her Tramadol at that time.

## 2024-06-12 ENCOUNTER — TELEMEDICINE (OUTPATIENT)
Dept: PAIN MEDICINE | Facility: CLINIC | Age: 49
End: 2024-06-12
Payer: COMMERCIAL

## 2024-06-12 VITALS — BODY MASS INDEX: 30.43 KG/M2 | WEIGHT: 155 LBS | HEIGHT: 60 IN

## 2024-06-12 DIAGNOSIS — G89.29 CHRONIC LOW BACK PAIN, UNSPECIFIED BACK PAIN LATERALITY, UNSPECIFIED WHETHER SCIATICA PRESENT: ICD-10-CM

## 2024-06-12 DIAGNOSIS — M47.816 LUMBAR FACET ARTHROPATHY: Primary | ICD-10-CM

## 2024-06-12 DIAGNOSIS — M54.50 CHRONIC LOW BACK PAIN, UNSPECIFIED BACK PAIN LATERALITY, UNSPECIFIED WHETHER SCIATICA PRESENT: ICD-10-CM

## 2024-06-12 PROCEDURE — 1036F TOBACCO NON-USER: CPT | Performed by: NURSE PRACTITIONER

## 2024-06-12 PROCEDURE — 99214 OFFICE O/P EST MOD 30 MIN: CPT | Performed by: NURSE PRACTITIONER

## 2024-06-12 RX ORDER — SODIUM CHLORIDE, SODIUM LACTATE, POTASSIUM CHLORIDE, CALCIUM CHLORIDE 600; 310; 30; 20 MG/100ML; MG/100ML; MG/100ML; MG/100ML
20 INJECTION, SOLUTION INTRAVENOUS CONTINUOUS
OUTPATIENT
Start: 2024-06-12

## 2024-06-12 RX ORDER — TRAMADOL HYDROCHLORIDE 50 MG/1
50 TABLET ORAL 3 TIMES DAILY PRN
Qty: 90 TABLET | Refills: 1 | Status: SHIPPED | OUTPATIENT
Start: 2024-06-13 | End: 2024-07-13

## 2024-06-12 ASSESSMENT — PAIN SCALES - GENERAL
PAINLEVEL: 7
PAINLEVEL_OUTOF10: 7

## 2024-06-12 ASSESSMENT — PAIN - FUNCTIONAL ASSESSMENT: PAIN_FUNCTIONAL_ASSESSMENT: 0-10

## 2024-06-12 ASSESSMENT — PAIN DESCRIPTION - DESCRIPTORS: DESCRIPTORS: ACHING

## 2024-06-12 NOTE — PROGRESS NOTES
Subjective   Patient ID: Meghan Hernández is a 48 y.o. female who presents for Follow-up (Post LMBB - almost 100% relief for 4hrs).    Virtual or Telephone Consent    An interactive audio and video telecommunication system which permits real time communications between the patient (at the originating site) and provider (at the distant site) was utilized to provide this telehealth service.   Verbal consent was requested and obtained from Meghan Hernández on this date, 06/12/24 for a telehealth visit.     HPI 49 YO Female with a PMHx significant for MDD, RA, Ankylosing Spondylitis, Low back and Neck Pain. She presents for a follow-up to her 2nd diagnostic Bilateral L4-5, L5-S1 MBNB she underwent on 5/30/24 with Dr. Mandujano. She reports 90-95% resolution in pain for 4-5 hours post-procedure. Her pain has since returned to baseline and she is eager to proceed with scheduling RFA. Of note, Meghan is also due for her Tramadol refill. She is currently taking 50mg TID. She reports this regimen allows her to participate actively in her ADLs as well as provides her some quality of life without any adverse effects from the medication.     Review of Systems Unless noted in the HPI all other systems have been reviewed and are negative for complaint.     Objective   Physical Exam Telehealth Visit  General- No acute distress, well appearing and well nourished.   Eyes Conjunctiva and lids: No erythema, swelling or discharge  Pulmonary - Respiratory effort: Normal respiration.   Neurologic - Coordination: Normal. A&Ox3.  Psychiatric - Orientation to person, place, and time: Normal. Mood and affect: Normal.    Assessment/Plan   Problem List Items Addressed This Visit    None  Visit Diagnoses       Lumbar facet arthropathy    -  Primary    Relevant Orders    Radiofrequency Ablation    [place] pain management    Chronic low back pain, unspecified back pain laterality, unspecified whether sciatica present        Relevant Medications    traMADol  (Ultram) 50 mg tablet (Start on 6/13/2024)          TREATMENT PLAN:  I had a nice discussion with the patient today and our plan will be as follows:  Radiology: No new imaging to review at this time.   Physically: Encouraged patient to continue with increased physical activity as able.   Psychologically: No acute psychological needs at this time.    Medication: I will refill the patient's opioids today for [ 1 ] month. The patient continues to see benefit and improvement in their quality of life and ability to maintain ADLs. Patient educated about the risks of taking opioids and operating a motor vehicle.  Patient reports no adverse side effects to current medication regimen.  Current regimen does allow patient to maintain ADLs.  Patient reports no new neurologic symptoms, new pain areas, or exacerbation in pain today.  Patient reports they are happy with current treatment care path.  Patient has been educated on the risks, benefits, and alternatives of controlled substances as well as the proper way to store these medications. The patient and I discussed the nature of this medication and its side effects.  We discussed tolerance, physical dependence, psychological dependence, addiction and opioid-induced hyperalgesia.  We discussed the potential need to wean from this medication.  We discussed the availability of programs that can help with this process if necessary.  We discussed safety issues related to opioids including safe storage.  We discussed the fact that the patient should not drive an automobile or operate heavy machinery while taking this medication.  A prescription for naloxone was offered to the patient.  The patient will be re-evaluated for the need to continue opioid therapy in 60-90 days.  A PDMP report was reviewed today and was consistent with reported prescribing. Tox screen is up-to-date and consistent with prescribing hx.   Duration: Chronic/ongoing..   Intervention: Patient is s/p her 2nd  diagnostic Bilateral L4-5, L5-S1 MBNB she underwent on 5/30/24 with Dr. Mandujano. She reports 90-95% resolution in pain for 4-5 hours post-procedure. Her pain has since returned to baseline and she is eager to proceed with scheduling RFA.

## 2024-06-17 ENCOUNTER — OFFICE VISIT (OUTPATIENT)
Dept: OBSTETRICS AND GYNECOLOGY | Facility: CLINIC | Age: 49
End: 2024-06-17
Payer: COMMERCIAL

## 2024-06-17 VITALS
HEIGHT: 60 IN | DIASTOLIC BLOOD PRESSURE: 84 MMHG | BODY MASS INDEX: 29.76 KG/M2 | WEIGHT: 151.6 LBS | SYSTOLIC BLOOD PRESSURE: 130 MMHG

## 2024-06-17 DIAGNOSIS — N95.1 PERIMENOPAUSE: Primary | ICD-10-CM

## 2024-06-17 DIAGNOSIS — N92.6 IRREGULAR MENSES: ICD-10-CM

## 2024-06-17 DIAGNOSIS — R45.86 MOOD SWINGS: ICD-10-CM

## 2024-06-17 PROCEDURE — 99213 OFFICE O/P EST LOW 20 MIN: CPT | Performed by: OBSTETRICS & GYNECOLOGY

## 2024-06-17 PROCEDURE — 1036F TOBACCO NON-USER: CPT | Performed by: OBSTETRICS & GYNECOLOGY

## 2024-06-17 RX ORDER — TRIAMCINOLONE ACETONIDE 1 MG/G
CREAM TOPICAL
COMMUNITY
Start: 2024-06-10

## 2024-06-17 RX ORDER — ACETAMINOPHEN 500 MG
TABLET ORAL DAILY
COMMUNITY

## 2024-06-17 RX ORDER — OMEPRAZOLE 40 MG/1
CAPSULE, DELAYED RELEASE ORAL
COMMUNITY
Start: 2022-07-19

## 2024-06-17 ASSESSMENT — PATIENT HEALTH QUESTIONNAIRE - PHQ9
2. FEELING DOWN, DEPRESSED OR HOPELESS: NOT AT ALL
SUM OF ALL RESPONSES TO PHQ9 QUESTIONS 1 & 2: 0
1. LITTLE INTEREST OR PLEASURE IN DOING THINGS: NOT AT ALL

## 2024-06-17 ASSESSMENT — ENCOUNTER SYMPTOMS
DIZZINESS: 0
WEAKNESS: 0
HEADACHES: 0
ABDOMINAL DISTENTION: 0
DEPRESSION: 0
NECK PAIN: 1
OCCASIONAL FEELINGS OF UNSTEADINESS: 0
CHEST TIGHTNESS: 0
DYSURIA: 0
ADENOPATHY: 0
DIFFICULTY URINATING: 0
LOSS OF SENSATION IN FEET: 0
ACTIVITY CHANGE: 1
MYALGIAS: 1
ABDOMINAL PAIN: 0
JOINT SWELLING: 1
DYSPHORIC MOOD: 1
FATIGUE: 1
SHORTNESS OF BREATH: 0
NECK STIFFNESS: 1
UNEXPECTED WEIGHT CHANGE: 0
COLOR CHANGE: 0

## 2024-06-17 ASSESSMENT — LIFESTYLE VARIABLES
AUDIT-C TOTAL SCORE: 0
HOW OFTEN DO YOU HAVE SIX OR MORE DRINKS ON ONE OCCASION: NEVER
HOW MANY STANDARD DRINKS CONTAINING ALCOHOL DO YOU HAVE ON A TYPICAL DAY: PATIENT DOES NOT DRINK
SKIP TO QUESTIONS 9-10: 1
HOW OFTEN DO YOU HAVE A DRINK CONTAINING ALCOHOL: NEVER

## 2024-06-17 ASSESSMENT — PAIN SCALES - GENERAL: PAINLEVEL: 0-NO PAIN

## 2024-06-17 NOTE — PROGRESS NOTES
Subjective   Meghan Hernández is a 48 y.o. female who is here for med chk; last seen 12/2023 for perimenopausal dub/mood/pain issues. FSH 67.5.  Complaints:  vaso/sleep/mood sxs,  chronic neck/lumbar pain issues, and lumps under each arm pit.  Noting vaginal dryness; wiping irritates now.  Seeing rheum for ank spond and polyarthrlagias, just incr Cosentyx to every 2 wks; has to stop alcohol d/t medication interaction/effects on liver.  Upcoming nerve ablation through pain management for lower lumbar; disc issue; 2 temp blocks  worked well; also now on tramadol. Still also bothersome neck pain.  lo=loestrin to relieve dub to good effect and duloxetine for mood/ chronic pain/polyathralgia.   dxd w ankylosing spondylitis by rheum specialist Azem per exam/x rays; prescribed prednisone taper/w f/u 08/23.   NO reg menses on ocps; spots if forgets ocp dose.  Review of Systems   Constitutional:  Positive for activity change and fatigue. Negative for unexpected weight change.   Respiratory:  Negative for chest tightness and shortness of breath.    Cardiovascular:  Negative for chest pain and leg swelling.   Gastrointestinal:  Negative for abdominal distention and abdominal pain.   Genitourinary:  Negative for difficulty urinating, dysuria, genital sores, pelvic pain, vaginal bleeding, vaginal discharge and vaginal pain.   Musculoskeletal:  Positive for joint swelling, myalgias, neck pain and neck stiffness. Negative for gait problem.   Skin:  Negative for color change and rash.   Neurological:  Negative for dizziness, weakness and headaches.   Hematological:  Negative for adenopathy.   Psychiatric/Behavioral:  Positive for dysphoric mood.    Objective Visit Vitals  /84   Ht 1.524 m (5')   Wt 68.8 kg (151 lb 9.6 oz)   LMP  (LMP Unknown) Comment: last period 2023   BMI 29.61 kg/m²   OB Status Perimenopausal   Smoking Status Former   BSA 1.71 m²       General:   Alert and oriented, in no acute distress   Neck: Supple. No  visible thyromegaly.    Breast/Axilla: BOTH axilla have focal adiposity c/w lipomas; No discrete lad or mass. Normal to palpation bilaterally without masses, skin changes, or nipple discharge.    Abdomen:    Vulva: Pelvic exam deferred until annual.pt denies sxs.   Vagina:    Cervix:    Uterus:    Adnexa:    Pelvic Floor    Psych Normal affect. Nl mood.    Assessment/Plan   Encounter Diagnoses   Name Primary?    Perimenopause; reporting good control on current meds.  Yes    Irregular menses; resolved by lo-loestrin.     Mood swings; current ssri and  ultra low dose ocp have imrpoved. As pain syndromes improving, mood and sleep also improving.     Annual due 12/2024.  Soheila Johnson MD

## 2024-06-20 ENCOUNTER — OFFICE VISIT (OUTPATIENT)
Dept: PRIMARY CARE | Facility: CLINIC | Age: 49
End: 2024-06-20
Payer: COMMERCIAL

## 2024-06-20 VITALS
WEIGHT: 152.6 LBS | SYSTOLIC BLOOD PRESSURE: 112 MMHG | HEIGHT: 60 IN | HEART RATE: 82 BPM | OXYGEN SATURATION: 98 % | BODY MASS INDEX: 29.96 KG/M2 | TEMPERATURE: 97.4 F | DIASTOLIC BLOOD PRESSURE: 80 MMHG

## 2024-06-20 DIAGNOSIS — E55.9 VITAMIN D DEFICIENCY: ICD-10-CM

## 2024-06-20 DIAGNOSIS — R59.1 LYMPHADENOPATHY: ICD-10-CM

## 2024-06-20 DIAGNOSIS — M45.0 ANKYLOSING SPONDYLITIS OF MULTIPLE SITES IN SPINE (MULTI): ICD-10-CM

## 2024-06-20 DIAGNOSIS — F32.A DEPRESSION, UNSPECIFIED DEPRESSION TYPE: ICD-10-CM

## 2024-06-20 DIAGNOSIS — Z00.00 ANNUAL PHYSICAL EXAM: Primary | ICD-10-CM

## 2024-06-20 DIAGNOSIS — Z23 ENCOUNTER FOR IMMUNIZATION: ICD-10-CM

## 2024-06-20 PROBLEM — M79.7 FIBROMYALGIA: Status: RESOLVED | Noted: 2024-02-09 | Resolved: 2024-06-20

## 2024-06-20 PROCEDURE — 99396 PREV VISIT EST AGE 40-64: CPT | Performed by: FAMILY MEDICINE

## 2024-06-20 PROCEDURE — 1036F TOBACCO NON-USER: CPT | Performed by: FAMILY MEDICINE

## 2024-06-20 PROCEDURE — 90715 TDAP VACCINE 7 YRS/> IM: CPT | Performed by: FAMILY MEDICINE

## 2024-06-20 PROCEDURE — 90471 IMMUNIZATION ADMIN: CPT | Performed by: FAMILY MEDICINE

## 2024-06-20 RX ORDER — DULOXETIN HYDROCHLORIDE 60 MG/1
60 CAPSULE, DELAYED RELEASE ORAL 2 TIMES DAILY
Qty: 90 CAPSULE | Refills: 3 | Status: SHIPPED | OUTPATIENT
Start: 2024-06-20 | End: 2025-06-20

## 2024-06-20 ASSESSMENT — PAIN SCALES - GENERAL: PAINLEVEL: 6

## 2024-06-20 ASSESSMENT — LIFESTYLE VARIABLES: HOW MANY STANDARD DRINKS CONTAINING ALCOHOL DO YOU HAVE ON A TYPICAL DAY: PATIENT DOES NOT DRINK

## 2024-06-20 NOTE — PATIENT INSTRUCTIONS
Get labs done fasting    Dr. Amrita Alberts (Dermatologist)  Address: [address], [place] [address]  Phone: [phone]

## 2024-06-20 NOTE — PROGRESS NOTES
"History Of Present Illness  Meghan Hernández is a 48 y.o. female presenting for Annual Exam (Yearly physical exam.)  .    HPI     Health maintenance: Mammogram UTD, sees OBGYN Dr. Johnson, next appt 12/5/2024. Colonoscopy 10/2022, due again in 2027.     Depression: manageable on duloxetine. Still \"down in the dumps\" because of chronic pain. Change of rheumatological meds is helping with pain somewhat.     She also complains of swelling in her bilateral armpits and now above her clavicle.  Rheumatology believes this is due to steroid use.  Patient denies any fever, chills, night sweats.  Rheumatology apparently requested cortisol levels.    She has both iron and vitamin D deficiency and is on supplementation.      Past Medical History  Patient Active Problem List    Diagnosis Date Noted    Vitamin D deficiency 06/20/2024    Sleep-related movement disorder 02/09/2024    Internal hemorrhoids 02/09/2024    Inflammation of sacroiliac joint (CMS-HCC) 02/09/2024    Neck pain 02/09/2024    Chronic pain syndrome 02/09/2024    Mood swings 12/01/2023    Radiculopathy, lumbosacral region 11/15/2023    Radiculopathy, cervical region 11/15/2023    Lumbosacral radiculitis 11/14/2023    Cervical radiculitis 10/26/2023    Daytime somnolence 09/01/2023    Irregular menses 09/01/2023    Perimenopause 09/01/2023    Schatzki's ring 09/01/2023    Ankylosing spondylitis (Multi) 05/18/2023    Rheumatoid arthritis (Multi) 05/15/2023    Dysphagia 06/21/2022    Gastroesophageal reflux disease without esophagitis 06/21/2022        Medications  Current Outpatient Medications on File Prior to Visit   Medication Sig    albuterol 90 mcg/actuation inhaler Inhale 2 puffs every 6 hours if needed for wheezing or shortness of breath.    cholecalciferol (Vitamin D3) 5,000 Units tablet Take by mouth once daily.    Cosentyx Pen 150 mg/mL self-injector pen 2 mL (300 mg).    cyanocobalamin (Vitamin B-12) 100 mcg tablet Take by mouth. As directed    " iron,carb/vit C/vit B12/folic (IRON 100 PLUS ORAL) Take by mouth.    methocarbamol (Robaxin) 750 mg tablet Take 1 tablet (750 mg) by mouth 4 times a day as needed for muscle spasms.    norethindrone-e.estradioL-iron (Lo Loestrin) 1 mg-10 mcg (24)/10 mcg (2) tablet Take 1 tablet by mouth once daily.    omeprazole (PriLOSEC) 40 mg DR capsule Take by mouth.    traMADol (Ultram) 50 mg tablet Take 1 tablet (50 mg) by mouth 3 times a day as needed for moderate pain (4 - 6) or severe pain (7 - 10). Do not fill before June 13, 2024.    triamcinolone (Kenalog) 0.1 % cream APPLY CREAM EXTERNALLY TO AFFECTED AREA TWICE DAILY    [DISCONTINUED] DULoxetine (Cymbalta) 60 mg DR capsule Take 1 capsule (60 mg) by mouth once daily.    [DISCONTINUED] ibuprofen 600 mg tablet Take 1 tablet (600 mg) by mouth every 6 hours if needed for mild pain (1 - 3). FOR PATIENT RECEIVING KETOROLAC.  DO NO GIVE IBUPROFEN FOR 6 HRS FOLLOWING KETOROLAC ADMIN.  NOT TO EXCEED 5 TABLES IN 24 HOURS     No current facility-administered medications on file prior to visit.        Surgical History  She has a past surgical history that includes Procedure Not Performed; Colonoscopy; Dilation and curettage of uterus; and Epidural block injection (Multiple).     Social History  She reports that she quit smoking about 4 years ago. Her smoking use included cigarettes. She has a 30 pack-year smoking history. She has never used smokeless tobacco. She reports that she does not currently use alcohol after a past usage of about 2.0 standard drinks of alcohol per week. She reports that she does not use drugs.    Family History  Family History   Problem Relation Name Age of Onset    Thyroid disease Mother      Colon cancer Father Mynor     Cancer Father Mynor     Thyroid disease Sister      Cerebral palsy Brother          1/2 brother    Stomach cancer Maternal Grandmother      Breast cancer Other AUNT     Heart attack Other UNCLE     Arthritis Paternal Grandmother Shaniqua          Allergies  Patient has no known allergies.    Immunizations  Immunization History   Administered Date(s) Administered    Moderna SARS-CoV-2 Vaccination 04/19/2021, 05/17/2021, 01/22/2022    Tdap vaccine, age 7 year and older (BOOSTRIX, ADACEL) 06/20/2024        ROS  Negative, except as discussed in HPI     Vitals  /80   Pulse 82   Temp 36.3 °C (97.4 °F)   Ht 1.524 m (5')   Wt 69.2 kg (152 lb 9.6 oz)   LMP  (LMP Unknown) Comment: last period 2023  SpO2 98%   BMI 29.80 kg/m²      Physical Exam  Vitals and nursing note reviewed.   Constitutional:       Appearance: Normal appearance.   HENT:      Head: Normocephalic.      Right Ear: Tympanic membrane normal.      Left Ear: Tympanic membrane normal.      Nose: Nose normal.      Mouth/Throat:      Mouth: Mucous membranes are moist.   Eyes:      Extraocular Movements: Extraocular movements intact.      Conjunctiva/sclera: Conjunctivae normal.      Pupils: Pupils are equal, round, and reactive to light.   Cardiovascular:      Rate and Rhythm: Normal rate and regular rhythm.      Heart sounds: Normal heart sounds.   Pulmonary:      Effort: Pulmonary effort is normal. No respiratory distress.      Breath sounds: Normal breath sounds.   Abdominal:      General: Abdomen is flat.      Palpations: Abdomen is soft.      Tenderness: There is no abdominal tenderness.   Musculoskeletal:      Cervical back: Neck supple.   Lymphadenopathy:      Cervical: No cervical adenopathy.   Skin:     General: Skin is warm and dry.      Findings: No rash.   Neurological:      General: No focal deficit present.      Mental Status: She is alert. Mental status is at baseline.      Coordination: Coordination normal.      Gait: Gait normal.      Deep Tendon Reflexes: Reflexes normal.   Psychiatric:         Mood and Affect: Mood normal.         Behavior: Behavior normal.         Relevant Results  Lab Results   Component Value Date    WBC 6.5 09/07/2021    WBC 8.2 11/11/2019    HGB  "13.1 09/07/2021    HGB 13.0 11/11/2019    HCT 41.5 09/07/2021    HCT 40.4 11/11/2019    MCV 92.6 09/07/2021    MCV 90.2 11/11/2019     09/07/2021     11/11/2019     Lab Results   Component Value Date     09/07/2021     11/11/2019    K 4.2 09/07/2021    K 3.6 11/11/2019     09/07/2021     11/11/2019    CO2 24 09/07/2021    CO2 27 11/11/2019    BUN 4 (L) 09/07/2021    BUN 4 (L) 11/11/2019    CREATININE 0.6 09/07/2021    CREATININE 0.8 11/11/2019    CALCIUM 9.4 09/07/2021    CALCIUM 9.7 11/11/2019    PROT 7.1 09/07/2021    PROT 7.7 11/11/2019    BILITOT 0.3 09/07/2021    BILITOT 0.2 11/11/2019    ALKPHOS 76 09/07/2021    ALKPHOS 86 11/11/2019    ALT 8 09/07/2021    ALT 15 11/11/2019    AST 17 09/07/2021    AST 15 11/11/2019    GLUCOSE 103 (H) 09/07/2021    GLUCOSE 122 (H) 11/11/2019     No results found for: \"HGBA1C\"  Lab Results   Component Value Date    TSH 1.64 09/07/2021      No results found for: \"CHOL\", \"TRIG\", \"HDL\", \"LDLDIRECT\"        Assessment/Plan   Meghan was seen today for annual exam.  Diagnoses and all orders for this visit:  Annual physical exam (Primary)  -     Lipid Panel; Future  Encounter for immunization  -     Tdap vaccine, age 7 years and older  Vitamin D deficiency  Depression, unspecified depression type  -     DULoxetine (Cymbalta) 60 mg DR capsule; Take 1 capsule (60 mg) by mouth 2 times a day.  Ankylosing spondylitis of multiple sites in spine (Multi)  -     Cortisol AM; Future  -     CT cardiac scoring wo IV contrast; Future  Lymphadenopathy  -     US head neck soft tissue; Future         Counseling:   Medication education:   -Education:  The patient is counseled regarding potential side-effects of any and all new medications  -Understanding:  Patient expressed understanding of information discussed today  -Adherence:  No barriers to adherence identified    Final treatment plan is a result of shared decision making with patient.         Pepe Gee MD "

## 2024-06-26 ENCOUNTER — HOSPITAL ENCOUNTER (OUTPATIENT)
Dept: RADIOLOGY | Facility: HOSPITAL | Age: 49
Discharge: HOME | End: 2024-06-26
Payer: COMMERCIAL

## 2024-06-26 ENCOUNTER — LAB (OUTPATIENT)
Dept: LAB | Facility: LAB | Age: 49
End: 2024-06-26
Payer: COMMERCIAL

## 2024-06-26 DIAGNOSIS — Z00.00 ANNUAL PHYSICAL EXAM: ICD-10-CM

## 2024-06-26 DIAGNOSIS — M45.0 ANKYLOSING SPONDYLITIS OF MULTIPLE SITES IN SPINE (MULTI): ICD-10-CM

## 2024-06-26 DIAGNOSIS — R59.1 LYMPHADENOPATHY: ICD-10-CM

## 2024-06-26 LAB
CHOLEST SERPL-MCNC: 185 MG/DL (ref 133–200)
CHOLEST/HDLC SERPL: 2.9 {RATIO}
CORTIS AM PEAK SERPL-MSCNC: 14 UG/DL (ref 5–20)
HDLC SERPL-MCNC: 64 MG/DL
LDLC SERPL CALC-MCNC: 109 MG/DL (ref 65–130)
TRIGL SERPL-MCNC: 61 MG/DL (ref 40–150)

## 2024-06-26 PROCEDURE — 76770 US EXAM ABDO BACK WALL COMP: CPT

## 2024-06-26 PROCEDURE — 36415 COLL VENOUS BLD VENIPUNCTURE: CPT

## 2024-06-26 PROCEDURE — 82533 TOTAL CORTISOL: CPT

## 2024-06-26 PROCEDURE — 76882 US LMTD JT/FCL EVL NVASC XTR: CPT | Performed by: RADIOLOGY

## 2024-06-26 PROCEDURE — 80061 LIPID PANEL: CPT

## 2024-06-27 ENCOUNTER — TELEPHONE (OUTPATIENT)
Dept: PAIN MEDICINE | Facility: CLINIC | Age: 49
End: 2024-06-27
Payer: COMMERCIAL

## 2024-07-11 ENCOUNTER — HOSPITAL ENCOUNTER (OUTPATIENT)
Dept: GASTROENTEROLOGY | Facility: HOSPITAL | Age: 49
Discharge: HOME | End: 2024-07-11
Payer: COMMERCIAL

## 2024-07-11 VITALS
TEMPERATURE: 97.2 F | HEART RATE: 81 BPM | RESPIRATION RATE: 16 BRPM | HEIGHT: 60 IN | WEIGHT: 150 LBS | DIASTOLIC BLOOD PRESSURE: 74 MMHG | OXYGEN SATURATION: 98 % | SYSTOLIC BLOOD PRESSURE: 111 MMHG | BODY MASS INDEX: 29.45 KG/M2

## 2024-07-11 DIAGNOSIS — M47.816 LUMBAR FACET ARTHROPATHY: ICD-10-CM

## 2024-07-11 PROCEDURE — 64635 DESTROY LUMB/SAC FACET JNT: CPT | Mod: 50 | Performed by: ANESTHESIOLOGY

## 2024-07-11 PROCEDURE — 99152 MOD SED SAME PHYS/QHP 5/>YRS: CPT | Performed by: ANESTHESIOLOGY

## 2024-07-11 PROCEDURE — 99153 MOD SED SAME PHYS/QHP EA: CPT | Performed by: ANESTHESIOLOGY

## 2024-07-11 PROCEDURE — 64636 DESTROY L/S FACET JNT ADDL: CPT | Mod: 50 | Performed by: ANESTHESIOLOGY

## 2024-07-11 PROCEDURE — 2500000005 HC RX 250 GENERAL PHARMACY W/O HCPCS: Performed by: ANESTHESIOLOGY

## 2024-07-11 PROCEDURE — 64635 DESTROY LUMB/SAC FACET JNT: CPT | Performed by: ANESTHESIOLOGY

## 2024-07-11 PROCEDURE — 64636 DESTROY L/S FACET JNT ADDL: CPT | Performed by: ANESTHESIOLOGY

## 2024-07-11 PROCEDURE — 2500000004 HC RX 250 GENERAL PHARMACY W/ HCPCS (ALT 636 FOR OP/ED): Performed by: ANESTHESIOLOGY

## 2024-07-11 RX ORDER — FENTANYL CITRATE 50 UG/ML
INJECTION, SOLUTION INTRAMUSCULAR; INTRAVENOUS AS NEEDED
Status: COMPLETED | OUTPATIENT
Start: 2024-07-11 | End: 2024-07-11

## 2024-07-11 RX ORDER — LIDOCAINE HYDROCHLORIDE 20 MG/ML
INJECTION, SOLUTION EPIDURAL; INFILTRATION; INTRACAUDAL; PERINEURAL AS NEEDED
Status: COMPLETED | OUTPATIENT
Start: 2024-07-11 | End: 2024-07-11

## 2024-07-11 RX ORDER — LIDOCAINE HYDROCHLORIDE 10 MG/ML
INJECTION, SOLUTION EPIDURAL; INFILTRATION; INTRACAUDAL; PERINEURAL AS NEEDED
Status: COMPLETED | OUTPATIENT
Start: 2024-07-11 | End: 2024-07-11

## 2024-07-11 RX ORDER — MIDAZOLAM HYDROCHLORIDE 1 MG/ML
INJECTION, SOLUTION INTRAMUSCULAR; INTRAVENOUS AS NEEDED
Status: COMPLETED | OUTPATIENT
Start: 2024-07-11 | End: 2024-07-11

## 2024-07-11 RX ORDER — SODIUM CHLORIDE, SODIUM LACTATE, POTASSIUM CHLORIDE, CALCIUM CHLORIDE 600; 310; 30; 20 MG/100ML; MG/100ML; MG/100ML; MG/100ML
20 INJECTION, SOLUTION INTRAVENOUS CONTINUOUS
Status: DISCONTINUED | OUTPATIENT
Start: 2024-07-11 | End: 2024-07-12 | Stop reason: HOSPADM

## 2024-07-11 ASSESSMENT — PAIN DESCRIPTION - DESCRIPTORS
DESCRIPTORS: ACHING
DESCRIPTORS: ACHING

## 2024-07-11 ASSESSMENT — PAIN SCALES - GENERAL
PAINLEVEL_OUTOF10: 8
PAINLEVEL_OUTOF10: 5 - MODERATE PAIN
PAINLEVEL_OUTOF10: 6

## 2024-07-11 ASSESSMENT — PAIN - FUNCTIONAL ASSESSMENT
PAIN_FUNCTIONAL_ASSESSMENT: 0-10
PAIN_FUNCTIONAL_ASSESSMENT: 0-10

## 2024-07-11 NOTE — INTERVAL H&P NOTE
H&P reviewed. The patient was examined and there are no changes to the H&P. Pt is a 49 yo F with lumbar spondylosis who presents for LMBN RFA of BL L4-5, L5-S1 facet joints under IV sed to target pain generator as seen on imaging and minimize risk/likelihood of chronic opioid use and/or surgery. Patient's pain stable and persistent from last visit.  Appropriately NPO.  No personal/family hx issues with anesthesia. Denies allergies to Latex, steroids, local anesthetics, or iodine/contrast. Denies being on blood thinners. Not diabetic.  Denies fever, chills, NS, CP, SOB, cough, N/V.    Discussed procedure risks/benefits in detail with patient. Pt meets medical necessity for procedure due to failure of conservative measures. Reviewed procedural risks including bleeding, infection, nerve damage, paralysis. Also reviewed mitigating factors such as screening for infection/blood thinner use, sterile precautions, and image-guidance when applicable. All questions answered. Pt/guardian expressed understanding and choose to proceed      Sofiya Mandujano MD  Anesthesiologist & Interventional Pain Physician   [facility]  O: [phone]  F: [phone]  7:56 AM  07/11/24

## 2024-07-11 NOTE — Clinical Note
Dressing applied to insertion site. Patient tolerated procedure well. Post grounding pad site clear.

## 2024-07-11 NOTE — POST-PROCEDURE NOTE
Band aid dry and intact. No neuro deficits. Ivory and andrew enjoyed.  Home with her . Chair to [employer].

## 2024-07-11 NOTE — DISCHARGE INSTRUCTIONS
DISCHARGE INSTRUCTIONS FOR INJECTIONS     You underwent a lumbar medial branch nerve radiofrequency ablation today    Aftermost injections, it is recommended that you relax and limit your activity for the remainder of the day unless you have been told otherwise by your pain physician.  You should not drive a car, operate machinery, or make important legal decisions unless otherwise directed by your pain physician.  You may resume your normal activity, including exercise, tomorrow.      Keep a written pain diary of how much pain relief you experienced following the injection procedure and the length of time of pain relief you experienced pain relief. Following diagnostic injections like medial branch nerve blocks, sacroiliac joint blocks, stellate ganglion injections and other blocks, it is very important you record the specific amount of pain relief you experienced immediately after the injectionand how long it lasted. Your doctor will ask you for this information at your follow up visit.     For all injections, please keep the injection site dry and inspect the site for a couple of days. You may remove the Band-Aid the day of the injection at any time.     Some discomfort, bruising or slight swelling may occur at the injection site. This is not abnormal if it occurs.  If needed you may:    -Take over the counter medication such as Tylenol or Motrin.   -Apply an ice pack for 30 minutes, 2 to 3 times a day for the first 24 hours.     You may shower today; no soaking baths, hot tubs, whirlpools or swimming pools for two days.      If you are given steroids in your injection, it may take 3-5 days for the steroid medication to take effect. You may notice a worsening of your symptoms for 1-2 days after the injection. This is not abnormal.  You may use acetaminophen, ibuprofen, or prescription medication that your doctor may have prescribed for you if you need to do so.     A few common side effects of steroids include  facial flushing, sweating, restlessness, irritability,difficulty sleeping, increase in blood sugar, and increased blood pressure. If you have diabetes, please monitor your blood sugar at least once a day for at least 5 days. If you have poorly controlled high blood pressure, monitoryour blood pressure for at least 2 days and contact your primary care physician if these numbers are unusually high for you.      If you take aspirin or non-steroidal anti-inflammatory drugs (examples are Motrin, Advil, ibuprofen, Naprosyn, Voltaren, Relafen, etc.) you may restart these this evening, but stop taking it 3 days before your next appointment, unless instructed otherwiseby your physician.      You do not need to discontinue non-aspirin-containing pain medications prior to an injection (examples: Celebrex, tramadol, hydrocodone and acetaminophen).      If you take a blood thinning medication (Coumadin, Lovenox, Fragmin,Ticlid, Plavix, Pradaxa, etc.), please discuss this with your primary care physician/cardiologist and your pain physician. These medications MUST be discontinued before you can have an injection safely, without the risk of uncontrolled bleeding. If these medications are not discontinued for an appropriate period of time, you will not be able to receivean injection.      If you are taking Coumadin, please have your INR checked the morning of your procedure and bringthe result to your appointment unless otherwise instructed. If your INR is over 1.2, your injection may need to be rescheduled to avoid uncontrolled bleeding from the needle placement.     Call [facility] at [phone] between 8am-4pm Monday - Friday if you are experiencing the following:    If you received an epidural or spinal injection:    -Headache that doesnot go away with medicine, is worse when sitting or standing up, and is greatly relieved upon lying down.   -Severe pain worse than or different than your baseline pain.    -Chills or fever (101º F or greater).   -Drainage or signs of infection at the injection site     Go directly to the Emergency Department if you are experiencing the following and received an epidural or spinal injection:   -Abrupt weakness or progressive weakness in your legs that starts after you leave the clinic.   -Abrupt severe or worsening numbness in your legs.   -Inability to urinate after the injection or loss of bowel or bladder control without the urge to defecate or urinate.     If you have a clinical question that cannot wait until your next appointment, please call [phone] between 8am-4pm Monday - Friday or send a [employer] message. We do our best to return all non-emergency messages within 24 hours, Monday - Friday. A nurse or physician will return your message.      If you need to cancel an appointment, please call the scheduling staff at [phone] during normal business hours or leave a message at least 24 hours in advance.     If you are going to be sedated for your next procedure, you MUST have responsible adult who can legally drive accompany you home. You cannot eat or drink for eight hours prior to the planned procedure if you are going to receive sedation. You may take your non-blood thinning medications with a small sip of water.

## 2024-08-08 ENCOUNTER — OFFICE VISIT (OUTPATIENT)
Dept: PAIN MEDICINE | Facility: CLINIC | Age: 49
End: 2024-08-08
Payer: COMMERCIAL

## 2024-08-08 VITALS
DIASTOLIC BLOOD PRESSURE: 73 MMHG | WEIGHT: 150 LBS | BODY MASS INDEX: 29.45 KG/M2 | OXYGEN SATURATION: 99 % | HEIGHT: 60 IN | SYSTOLIC BLOOD PRESSURE: 113 MMHG | HEART RATE: 84 BPM

## 2024-08-08 DIAGNOSIS — M54.17 RADICULOPATHY, LUMBOSACRAL REGION: Primary | ICD-10-CM

## 2024-08-08 DIAGNOSIS — M45.0 ANKYLOSING SPONDYLITIS OF MULTIPLE SITES IN SPINE (MULTI): ICD-10-CM

## 2024-08-08 DIAGNOSIS — M54.17 LUMBOSACRAL RADICULITIS: ICD-10-CM

## 2024-08-08 PROCEDURE — 99214 OFFICE O/P EST MOD 30 MIN: CPT | Performed by: NURSE PRACTITIONER

## 2024-08-08 PROCEDURE — 3008F BODY MASS INDEX DOCD: CPT | Performed by: NURSE PRACTITIONER

## 2024-08-08 PROCEDURE — 1036F TOBACCO NON-USER: CPT | Performed by: NURSE PRACTITIONER

## 2024-08-08 RX ORDER — TRAMADOL HYDROCHLORIDE 50 MG/1
100 TABLET ORAL EVERY 8 HOURS PRN
Qty: 180 TABLET | Refills: 0 | Status: SHIPPED | OUTPATIENT
Start: 2024-08-08 | End: 2024-09-07

## 2024-08-08 ASSESSMENT — PAIN DESCRIPTION - DESCRIPTORS: DESCRIPTORS: ACHING

## 2024-08-08 ASSESSMENT — PAIN SCALES - GENERAL
PAINLEVEL: 6
PAINLEVEL_OUTOF10: 6

## 2024-08-08 ASSESSMENT — PAIN - FUNCTIONAL ASSESSMENT: PAIN_FUNCTIONAL_ASSESSMENT: 0-10

## 2024-08-08 NOTE — PROGRESS NOTES
Subjective   Patient ID: Meghan Hernández is a 48 y.o. female who presents for Pain Management.     HPI 47 YO Female with a PMHx significant for MDD, RA, Ankylosing Spondylitis, Low back and Neck Pain. She presents for a follow-up to her Bilateral L4-5, L5-S1 MBN RFA she underwent on 07/11/24 with Dr. Mandujano. She reports about 50% improvement in pain post-procedure. Of note, Meghan is also due for her Tramadol refill. She is currently taking 50mg TID. She reports this regimen allows her to participate actively in her ADLs as well as provides her some quality of life without any adverse effects from the medication.     Review of Systems Unless noted in the HPI all other systems have been reviewed and are negative for complaint.     Objective   Physical Exam  General- No acute distress, well appearing and well nourished.    Eyes Conjunctiva and lids: No erythema, swelling or discharge  Neck - Supple, no cervical lymphadenopathy.   Pulmonary - Respiratory effort: Normal respiration.   Cardiovascular - Normal rate and rhythm.  Examination of extremities for edema and/or varicosities: No peripheral edema  Abdomen: Soft, Non-tender, non-distended, no abdominal masses.   Musculoskeletal - Range of motion: decreased ROM to the lumbar spine.   Skin - Skin and subcutaneous tissue: Normal without rashes or lesions.  Neurologic - Reflexes: Normal. Coordination: Antalgic Gait   Psychiatric - Orientation to person, place, and time: Normal. Mood and affect: Normal.    Assessment/Plan   Assessment & Plan  Ankylosing spondylitis of multiple sites in spine (Multi)    Orders:    traMADol (Ultram) 50 mg tablet; Take 2 tablets (100 mg) by mouth every 8 hours if needed for severe pain (7 - 10).    Radiculopathy, lumbosacral region    Orders:    traMADol (Ultram) 50 mg tablet; Take 2 tablets (100 mg) by mouth every 8 hours if needed for severe pain (7 - 10).    Lumbosacral radiculitis    Orders:    traMADol (Ultram) 50 mg tablet; Take 2  tablets (100 mg) by mouth every 8 hours if needed for severe pain (7 - 10).    TREATMENT PLAN:  I had a nice discussion with the patient today and our plan will be as follows:  Radiology: No new imaging to review at this time.   Physically: Encouraged patient to continue with increased physical activity as able.   Psychologically: No acute psychological needs at this time.    Medication: After discussing with my collaborating physician, Dr. Mandujano, I will refill the patient's opioids today, at an increased dose, for [ 1 ] month.  The patient continues to see benefit and improvement in their quality of life and ability to maintain ADLs. Patient educated about the risks of taking opioids and operating a motor vehicle. Patient reports no adverse side effects to current medication regimen.  Current regimen does allow patient to maintain ADLs.  Patient reports no new neurologic symptoms, new pain areas, or exacerbation in pain today.  Patient reports they are happy with current treatment care path.  Patient has been educated on the risks, benefits, and alternatives of controlled substances as well as the proper way to store these medications. The patient and I discussed the nature of this medication and its side effects.  We discussed tolerance, physical dependence, psychological dependence, addiction and opioid-induced hyperalgesia.  We discussed the potential need to wean from this medication.  We discussed the availability of programs that can help with this process if necessary.  We discussed safety issues related to opioids including safe storage.  We discussed the fact that the patient should not drive an automobile or operate heavy machinery while taking this medication.  A prescription for naloxone was offered to the patient.  The patient will be re-evaluated for the need to continue opioid therapy in 60-90 days.  A PDMP report was reviewed today and was consistent with reported prescribing. Tox screen is  up-to-date and consistent with prescribing history.  Duration: Chronic/ongoing.   Intervention: Patient is s/p Bilateral L4-5, L5-S1 MBN RFA she underwent on 07/11/24 with Dr. Mandujano with about 50% improvement in pain so far.

## 2024-08-08 NOTE — ASSESSMENT & PLAN NOTE
Orders:    traMADol (Ultram) 50 mg tablet; Take 2 tablets (100 mg) by mouth every 8 hours if needed for severe pain (7 - 10).

## 2024-09-09 ENCOUNTER — OFFICE VISIT (OUTPATIENT)
Dept: PAIN MEDICINE | Facility: CLINIC | Age: 49
End: 2024-09-09
Payer: COMMERCIAL

## 2024-09-09 VITALS
WEIGHT: 150 LBS | DIASTOLIC BLOOD PRESSURE: 75 MMHG | SYSTOLIC BLOOD PRESSURE: 123 MMHG | HEIGHT: 60 IN | HEART RATE: 91 BPM | BODY MASS INDEX: 29.45 KG/M2 | RESPIRATION RATE: 16 BRPM

## 2024-09-09 DIAGNOSIS — M54.17 LUMBOSACRAL RADICULITIS: ICD-10-CM

## 2024-09-09 DIAGNOSIS — M79.7 FIBROMYALGIA: ICD-10-CM

## 2024-09-09 DIAGNOSIS — M54.17 RADICULOPATHY, LUMBOSACRAL REGION: ICD-10-CM

## 2024-09-09 DIAGNOSIS — M54.12 CERVICAL RADICULITIS: Primary | ICD-10-CM

## 2024-09-09 DIAGNOSIS — M46.1 INFLAMMATION OF SACROILIAC JOINT (CMS-HCC): ICD-10-CM

## 2024-09-09 DIAGNOSIS — M45.0 ANKYLOSING SPONDYLITIS OF MULTIPLE SITES IN SPINE (MULTI): ICD-10-CM

## 2024-09-09 PROCEDURE — 99214 OFFICE O/P EST MOD 30 MIN: CPT | Performed by: ANESTHESIOLOGY

## 2024-09-09 PROCEDURE — 3008F BODY MASS INDEX DOCD: CPT | Performed by: ANESTHESIOLOGY

## 2024-09-09 RX ORDER — TRAMADOL HYDROCHLORIDE 50 MG/1
100 TABLET ORAL EVERY 8 HOURS PRN
Qty: 180 TABLET | Refills: 2 | Status: SHIPPED | OUTPATIENT
Start: 2024-09-09 | End: 2024-12-08

## 2024-09-09 ASSESSMENT — ENCOUNTER SYMPTOMS
OCCASIONAL FEELINGS OF UNSTEADINESS: 1
EYES NEGATIVE: 1
LOSS OF SENSATION IN FEET: 0
CONSTITUTIONAL NEGATIVE: 1
RESPIRATORY NEGATIVE: 1
ENDOCRINE NEGATIVE: 1
DEPRESSION: 1
GASTROINTESTINAL NEGATIVE: 1
NECK PAIN: 1
PSYCHIATRIC NEGATIVE: 1
BACK PAIN: 1
WEAKNESS: 1
HEMATOLOGIC/LYMPHATIC NEGATIVE: 1
NUMBNESS: 1
CARDIOVASCULAR NEGATIVE: 1

## 2024-09-09 ASSESSMENT — PAIN SCALES - GENERAL
PAINLEVEL: 5
PAINLEVEL_OUTOF10: 7

## 2024-09-09 ASSESSMENT — PAIN - FUNCTIONAL ASSESSMENT: PAIN_FUNCTIONAL_ASSESSMENT: 0-10

## 2024-09-09 ASSESSMENT — PAIN DESCRIPTION - DESCRIPTORS: DESCRIPTORS: ACHING;SHARP

## 2024-09-09 ASSESSMENT — PATIENT HEALTH QUESTIONNAIRE - PHQ9
SUM OF ALL RESPONSES TO PHQ9 QUESTIONS 1 AND 2: 6
7. TROUBLE CONCENTRATING ON THINGS, SUCH AS READING THE NEWSPAPER OR WATCHING TELEVISION: NEARLY EVERY DAY
6. FEELING BAD ABOUT YOURSELF - OR THAT YOU ARE A FAILURE OR HAVE LET YOURSELF OR YOUR FAMILY DOWN: NEARLY EVERY DAY
4. FEELING TIRED OR HAVING LITTLE ENERGY: NEARLY EVERY DAY
SUM OF ALL RESPONSES TO PHQ QUESTIONS 1-9: 21
8. MOVING OR SPEAKING SO SLOWLY THAT OTHER PEOPLE COULD HAVE NOTICED. OR THE OPPOSITE, BEING SO FIGETY OR RESTLESS THAT YOU HAVE BEEN MOVING AROUND A LOT MORE THAN USUAL: NEARLY EVERY DAY
10. IF YOU CHECKED OFF ANY PROBLEMS, HOW DIFFICULT HAVE THESE PROBLEMS MADE IT FOR YOU TO DO YOUR WORK, TAKE CARE OF THINGS AT HOME, OR GET ALONG WITH OTHER PEOPLE: SOMEWHAT DIFFICULT
2. FEELING DOWN, DEPRESSED OR HOPELESS: NEARLY EVERY DAY
3. TROUBLE FALLING OR STAYING ASLEEP OR SLEEPING TOO MUCH: NOT AT ALL
9. THOUGHTS THAT YOU WOULD BE BETTER OFF DEAD, OR OF HURTING YOURSELF: NOT AT ALL
5. POOR APPETITE OR OVEREATING: NEARLY EVERY DAY
1. LITTLE INTEREST OR PLEASURE IN DOING THINGS: NEARLY EVERY DAY

## 2024-09-09 ASSESSMENT — LIFESTYLE VARIABLES: TOTAL SCORE: 5

## 2024-09-09 NOTE — PROGRESS NOTES
PAIN MANAGEMENT FOLLOW-UP OFFICE NOTE    Date of Service: 9/9/2024    SUBJECTIVE    CHIEF COMPLAINT: LB pain    HISTORY OF PRESENT ILLNESS    Meghan Hernández is a 48 y.o. female with PMH MDD, RA, ankylosing spondylitis who presents for follow-up LB pain.     On 7/11, pt underwent BL LMBN RFA with 60% ongoing relief. Pt is able to tolerate standing/walking better. Notes new LBP radiating to R buttock and left lateral thigh. Pain is worst with sitting down.     Meanwhile neck pain has returned with radiation to BUE. Interested in repeated TIM.     Pt denies new-onset numbness, weakness, bowel/bladder incontinence.  Pt denies recent infection, allergy to Latex/iodine/contrast. Patient is currently taking the following blood thinner(s): N/A    Procedure Log:  -BL LMBN RFA 7/11/24: 60% ongoing  -R SIJ CSI 1/25/24: 50% ongoing relief  -C7-T1 GINA 12/28/23: 80% relief 8 mo  -C7-T1 GINA 10/26/23: 100% relief RUE, 40% neck    REVIEW OF SYSTEMS  Review of Systems   Constitutional: Negative.    HENT: Negative.     Eyes: Negative.    Respiratory: Negative.     Cardiovascular: Negative.    Gastrointestinal: Negative.    Endocrine: Negative.    Musculoskeletal:  Positive for back pain and neck pain.   Skin: Negative.    Neurological:  Positive for weakness and numbness.   Hematological: Negative.    Psychiatric/Behavioral: Negative.         PAST MEDICAL HISTORY  Past Medical History:   Diagnosis Date    Ankylosing spondylitis of site in spine (Multi) May2023    AS (ankylosing spondylitis) (Multi)     Chronic pain disorder Long time    Headache Entire life    Low back pain In my 20s    Migraine Entire life    Neck pain In my20s    Osteoarthritis May2023    Rheumatoid arthritis (Multi)      Past Surgical History:   Procedure Laterality Date    COLONOSCOPY      DILATION AND CURETTAGE OF UTERUS      ENDOSCOPY PROCEDURE NOT PERFORMED      EPIDURAL BLOCK INJECTION  Multiple     Family History   Problem Relation Name Age of Onset    Thyroid  disease Mother      Colon cancer Father Mynor     Cancer Father Mynor     Thyroid disease Sister      Cerebral palsy Brother          1/2 brother    Stomach cancer Maternal Grandmother      Breast cancer Other AUNT     Heart attack Other UNCLE     Arthritis Paternal Grandmother Shaniqua        CURRENT MEDICATIONS  Current Outpatient Medications   Medication Sig Dispense Refill    cholecalciferol (Vitamin D3) 5,000 Units tablet Take by mouth once daily.      Cosentyx Pen 150 mg/mL self-injector pen 2 mL (300 mg).      cyanocobalamin (Vitamin B-12) 100 mcg tablet Take by mouth. As directed      DULoxetine (Cymbalta) 60 mg DR capsule Take 1 capsule (60 mg) by mouth 2 times a day. 90 capsule 3    iron,carb/vit C/vit B12/folic (IRON 100 PLUS ORAL) Take by mouth.      methocarbamol (Robaxin) 750 mg tablet Take 1 tablet (750 mg) by mouth 4 times a day as needed for muscle spasms. 360 tablet 2    norethindrone-e.estradioL-iron (Lo Loestrin) 1 mg-10 mcg (24)/10 mcg (2) tablet Take 1 tablet by mouth once daily. 84 tablet 3    omeprazole (PriLOSEC) 40 mg DR capsule Take by mouth.       No current facility-administered medications for this visit.       ALLERGIES AND DRUG REACTIONS  No Known Allergies       OBJECTIVE  Visit Vitals  /75   Pulse 91   Resp 16   Ht 1.524 m (5')   Wt 68 kg (150 lb)   BMI 29.29 kg/m²   OB Status Perimenopausal   Smoking Status Former   BSA 1.7 m²       Last Recorded Pain Score (if available):                Physical Exam  Vitals and nursing note reviewed.     General: Sitting in chair, NAD  Head: NCAT  Eyes: Sclera/conjunctiva clear, EOMI, PERRL  Nose/mouth: MMM  CV: palpable radial pulse RRR  Lungs: Good/equal chest excursion  Abdomen: Soft, ND  Ext: No cyanosis/edema  MSK: c-spine: anterior tilt, BL paraspinal m TTP, neg Spurling/Lhermitte, pain with turning  L-spine: alignment WNL, BL paraspinal m TTP, L-spine extension lmtd by pain    Neuro: AAOx3, CN grossly nl  Dermatome sensation to light  touch  LEFT C5: WNL    RIGHT C5: WNL      LEFT C6: WNL       RIGHT C6: WNL      LEFT C7: WNL       RIGHT C7: WNL      LEFT C8: WNL       RIGHT C8: WNL      LEFT T1: WNL       RIGHT T1: WNL    LEFT L1 (lower pelvis/upper thigh): WNL    RIGHT L1: WNL      LEFT L2 (upper thigh): WNL       RIGHT: L2:WNL      LEFT L3 (medial knee): WNL       RIGHT L3: WNL      LEFT L4 (superior medial malleolus): WNL       RIGHT L4: WNL      LEFT L5 (dorsal foot): WNL       RIGHT L5: WNL      LEFT S1 (lateral foot): WNL     RIGHT S1: WNL      LEFT S2 (popliteal fossa): WNL    RIGHT S2: WNL        Motor strength  LEFT C5 (elbow flexion): 5/5   RIGHT C5: 5/5  LEFT C6 (wrist extension): 5/5     RIGHT C6: 5/5  LEFT C7 (elbow extension): 5/5     RIGHT C7: 5/5  LEFT C8 (finger abduction): 5/5     RIGHT C8: 5/5  LEFT T1 (hand ): 5/5     RIGHT T1: 5/5    LEFT L2 (hip flexion): 5/5   RIGHT L2: 5/5  LEFT L3 (knee extension): 5/5     RIGHT L3: 5/5  LEFT L4 (dorsiflexion): 5/5     RIGHT L4: 5/5  LEFT L5 (EHL extension): 5/5     RIGHT L5: 5/5  LEFT S1 (plantarflexion): 5/5     RIGHT S1: 5/5  LEFT S2 (knee flexion): 5/5      RIGHT S2: 5/5    Special testing  Forde: neg BL  DTR unremarkable  Seated slump test neg BL  Clonus: neg BL  Babinski: neg BL    Psych: affect nl  Skin: no rash/lesions      REVIEW OF LABORATORY DATA  I have reviewed the following lab results:  WBC   Date Value Ref Range Status   09/07/2021 6.5 4.5 - 11.0 K/UL Final     RBC   Date Value Ref Range Status   09/07/2021 4.48 4.0 - 4.9 M/UL Final     Hemoglobin   Date Value Ref Range Status   09/07/2021 13.1 12.0 - 15.0 GM/DL Final     Hematocrit   Date Value Ref Range Status   09/07/2021 41.5 36 - 44 % Final     MCV   Date Value Ref Range Status   09/07/2021 92.6 80 - 100 FL Final     MCH   Date Value Ref Range Status   09/07/2021 29.2 26 - 34 PG Final     MCHC   Date Value Ref Range Status   09/07/2021 31.6 31 - 37 % Final     Platelets   Date Value Ref Range Status  "  09/07/2021 246 150 - 450 K/UL Final     MPV   Date Value Ref Range Status   09/07/2021 11.0 7.0 - 12.6 CU Final     Sodium   Date Value Ref Range Status   09/07/2021 140 133 - 145 MMOL/L Final     Potassium   Date Value Ref Range Status   09/07/2021 4.2 3.4 - 5.1 MMOL/L Final     Bicarbonate   Date Value Ref Range Status   09/07/2021 24 24 - 31 MMOL/L Final     Urea Nitrogen   Date Value Ref Range Status   09/07/2021 4 (L) 8 - 25 MG/DL Final     Calcium   Date Value Ref Range Status   09/07/2021 9.4 8.5 - 10.4 MG/DL Final     No results found for: \"PROTIME\", \"PTT\", \"INR\", \"FIBRINOGEN\"      REVIEW OF RADIOLOGY   I have reviewed the following:  Radiology Studies           MRI c-spine 8/30/23:  There is loss of the normal cervical lordosis. No acute fracture is  identified. There is grade 1 anterolisthesis C4 on C5. There are posterior  osteophytes extending from the C3-C6 levels. No  STIR abnormalities are seen  within the marrow.     The visualized cord signal is grossly unremarkable.     C2-C3: No disc herniation. No significant canal or foraminal stenosis.  C3-C4: There is a posterior disc osteophyte complex. No significant canal or  foraminal stenosis.  C4-C5: There is a posterior disc osteophyte complex. No significant canal or  foraminal stenosis.  C5-C6: There is a posterior disc osteophyte complex. There is facet  osteoarthropathy. No significant canal stenosis. Mild right neural foraminal  narrowing.  C6-C7: There is a posterior disc osteophyte complex. There is facet  osteoarthropathy. No significant canal stenosis. Mild right neural foraminal  narrowing.  C7-T1: No disc herniation. No significant canal or foraminal stenosis.     The paravertebral soft tissues are unremarkable.     IMPRESSION:  Mild multilevel cervical spondylosis most severe at C5-C6 and C6-C7  minimally progressed as compared to the prior examination from 2014.            ASSESSMENT & PLAN  Meghan Hernández is a 48 y.o. old female with PMH " MDD, RA, ankylosing spondylitis who presents for F/U neck pain    1) Cervical radic  -Neck pain since 2021 radiating to right upper extremity with subjective fourth and fifth digit numbness, tingling, weakness. Possible contribution from rheumatoid arthritis, ankylosing spondylitis  -Refractive to Tylenol, NSAIDs, muscle relaxants, Cymbalta, topical lidocaine, >6 w PT  -MRI C-spine 8/30/2023: Multilevel spondylosis featuring grade 1 C4-5 listhesis, severe facet arthropathy at C5-6 and C6-7 with right neuroforaminal stenosis  -C7-T1 GINA 12/28/23: 80% relief 8 mo  -Schedule repeat C7-T1 GIAN w/ IV sed to target pain generator as seen on imaging and minimize risk/likelihood of chronic opioid use and/or surgery    2) LBP  -Low back pain since prior to 2008 with right leg pain, numbness, weakness. There is appreciable weakness in bilateral lower extremities possibly related to deconditioning rather than radiculopathy. Pain, however, may be related to ankylosing spondylitis  -Refractive to Tylenol, NSAIDs, muscle relaxants, Cymbalta, topical lidocaine, chiropractics, >6 w PT. Cannot tolerate return to PT due to pain  -x-ray lumbar spine 5/18/2023: L5-S1 facet arthropathy  -CT L-spine 12/18/23: mild disc bulging at L4-5, L5-S1 with mild R NFS at L4-5  -BL LMBN RFA 7/11/24: 60% ongoing    3) Sacroiliitis  -R-sided LBP reproduced on numerous R SIJ-provocative maneuvers on exam  -R SIJ CSI 1/25/24: 50% ongoing relief    4) Fibromyalgia  -Diffuse whole body pain for yrs on hx RA and ankylosing spondylitis on duloxetine  -Refractive to Tylenol, NSAIDs, muscle relaxants, Cymbalta, topical lidocaine, chiropractics, >6 w PT, gabapentin, pregabalin  -Cont methocarbamol 750 mg QID PRN    5) opioid mgmt  2/2 chronic neck and LBP refractive to conservative, interventional tx  -On tramadol 100 mg TID PRN  (30-60 MME) with good relief. Denies SE. Requests RF  - reviewed/appropriate. Reviewed UDS 5/13/24: OK. No sign of aberrant  behavior  -RF tramadol x30 d +2 RF        Discussed procedure risks/benefits in detail with patient. Pt meets medical necessity for procedure due to failure of conservative measures. Reviewed procedural risks including bleeding, infection, nerve damage, paralysis. Also reviewed mitigating factors such as screening for infection/blood thinner use, sterile precautions, and image-guidance when applicable. All questions answered. Pt/guardian expressed understanding and choose to proceed                Sofiya Mandujano MD  Anesthesiologist & Interventional Pain Physician   [facility] [place]  O: [phone]  F: [phone]  3:30 PM  09/09/24

## 2024-09-09 NOTE — PROGRESS NOTES
MEDICATION NAME: Tramadol  STRENGTH: 50mg  LAST FILL DATE: 24  DATE LAST TAKEN: 24  QUANTITY FILLED: 180  QUANTITY REMAININ  COUNT COMPLETED BY: ROMA DIAZ MA and TYE KAMINSKI      UDS LAST COMPLETED:   CONTROLLED SUBSTANCES AGREEMENT LAST SIGNED:   ORT LAST COMPLETED:  Modified [employer] disability form filled out annually.

## 2024-09-10 RX ORDER — SODIUM CHLORIDE, SODIUM LACTATE, POTASSIUM CHLORIDE, CALCIUM CHLORIDE 600; 310; 30; 20 MG/100ML; MG/100ML; MG/100ML; MG/100ML
20 INJECTION, SOLUTION INTRAVENOUS CONTINUOUS
OUTPATIENT
Start: 2024-09-10

## 2024-09-18 ENCOUNTER — TELEPHONE (OUTPATIENT)
Dept: PAIN MEDICINE | Facility: CLINIC | Age: 49
End: 2024-09-18
Payer: COMMERCIAL

## 2024-09-18 NOTE — TELEPHONE ENCOUNTER
Pt. Called and stated that she called and had her MRI, ordered 11/2023, scheduled for October 2. Radiology asked her to reach out to our office to see if you would still like the MRI completed or if the pt. Should cancel the appointment.

## 2024-09-19 NOTE — TELEPHONE ENCOUNTER
Pt. Called and made aware to keep MRI scheduled if it would be covered by her insurance. Pt. Advised to reach out to the radiology department to see if they have approval.

## 2024-09-26 ENCOUNTER — OFFICE VISIT (OUTPATIENT)
Dept: PRIMARY CARE | Facility: CLINIC | Age: 49
End: 2024-09-26
Payer: COMMERCIAL

## 2024-09-26 VITALS
OXYGEN SATURATION: 99 % | TEMPERATURE: 96.2 F | RESPIRATION RATE: 20 BRPM | HEART RATE: 104 BPM | HEIGHT: 60 IN | WEIGHT: 139 LBS | SYSTOLIC BLOOD PRESSURE: 150 MMHG | DIASTOLIC BLOOD PRESSURE: 84 MMHG | BODY MASS INDEX: 27.29 KG/M2

## 2024-09-26 DIAGNOSIS — M54.41 ACUTE MIDLINE LOW BACK PAIN WITH RIGHT-SIDED SCIATICA: Primary | ICD-10-CM

## 2024-09-26 PROCEDURE — 99214 OFFICE O/P EST MOD 30 MIN: CPT | Performed by: NURSE PRACTITIONER

## 2024-09-26 PROCEDURE — 1036F TOBACCO NON-USER: CPT | Performed by: NURSE PRACTITIONER

## 2024-09-26 PROCEDURE — 3008F BODY MASS INDEX DOCD: CPT | Performed by: NURSE PRACTITIONER

## 2024-09-26 RX ORDER — PREDNISONE 20 MG/1
60 TABLET ORAL DAILY
Qty: 15 TABLET | Refills: 0 | Status: SHIPPED | OUTPATIENT
Start: 2024-09-26 | End: 2024-10-01

## 2024-09-26 RX ORDER — NALOXONE HYDROCHLORIDE 4 MG/.1ML
1 SPRAY NASAL AS NEEDED
COMMUNITY
Start: 2024-08-08

## 2024-09-26 RX ORDER — TRAZODONE HYDROCHLORIDE 100 MG/1
100 TABLET ORAL NIGHTLY PRN
COMMUNITY
Start: 2024-08-04

## 2024-09-26 ASSESSMENT — PATIENT HEALTH QUESTIONNAIRE - PHQ9
9. THOUGHTS THAT YOU WOULD BE BETTER OFF DEAD, OR OF HURTING YOURSELF: NOT AT ALL
3. TROUBLE FALLING OR STAYING ASLEEP OR SLEEPING TOO MUCH: SEVERAL DAYS
SUM OF ALL RESPONSES TO PHQ9 QUESTIONS 1 AND 2: 4
SUM OF ALL RESPONSES TO PHQ9 QUESTIONS 1 AND 2: 0
1. LITTLE INTEREST OR PLEASURE IN DOING THINGS: MORE THAN HALF THE DAYS
2. FEELING DOWN, DEPRESSED OR HOPELESS: NOT AT ALL
7. TROUBLE CONCENTRATING ON THINGS, SUCH AS READING THE NEWSPAPER OR WATCHING TELEVISION: NOT AT ALL
2. FEELING DOWN, DEPRESSED OR HOPELESS: MORE THAN HALF THE DAYS
1. LITTLE INTEREST OR PLEASURE IN DOING THINGS: NOT AT ALL
4. FEELING TIRED OR HAVING LITTLE ENERGY: SEVERAL DAYS
SUM OF ALL RESPONSES TO PHQ QUESTIONS 1-9: 8
10. IF YOU CHECKED OFF ANY PROBLEMS, HOW DIFFICULT HAVE THESE PROBLEMS MADE IT FOR YOU TO DO YOUR WORK, TAKE CARE OF THINGS AT HOME, OR GET ALONG WITH OTHER PEOPLE: SOMEWHAT DIFFICULT
6. FEELING BAD ABOUT YOURSELF - OR THAT YOU ARE A FAILURE OR HAVE LET YOURSELF OR YOUR FAMILY DOWN: NOT AT ALL
8. MOVING OR SPEAKING SO SLOWLY THAT OTHER PEOPLE COULD HAVE NOTICED. OR THE OPPOSITE, BEING SO FIGETY OR RESTLESS THAT YOU HAVE BEEN MOVING AROUND A LOT MORE THAN USUAL: NOT AT ALL
5. POOR APPETITE OR OVEREATING: MORE THAN HALF THE DAYS

## 2024-09-26 ASSESSMENT — ENCOUNTER SYMPTOMS
BACK PAIN: 1
LOSS OF SENSATION IN FEET: 0
DEPRESSION: 0
OCCASIONAL FEELINGS OF UNSTEADINESS: 0

## 2024-09-26 ASSESSMENT — PAIN SCALES - GENERAL: PAINLEVEL: 9

## 2024-09-26 NOTE — PROGRESS NOTES
"Chief Complaint  Meghan Hernández is a 48 y.o. female presenting for \"Back Pain (Dr Patel pt- in the last two weeks low back pain has been getting worse./Pt had a lumbar nerve ablation 2 months ago, was not able to get in with PM or PCP. Pt has a hard time getting in and out of chair, is on pain medication but states nothing is helping.).\"    Back Pain         Meghan Hernández is a 48 y.o. female presenting for low back pain sees pm but was unable to get in with them no loss of bowel or bladder control has ultram for pain, will trial a high dose steroid burst to get her through to her PM appt, advised to call each morning for cancellations.  Some right sciatica at the end of th day.   Taking 100 mg of tramadol three times a day as needed.        Past Medical History  Patient Active Problem List    Diagnosis Date Noted    Vitamin D deficiency 06/20/2024    Sleep-related movement disorder 02/09/2024    Internal hemorrhoids 02/09/2024    Inflammation of sacroiliac joint (CMS-HCC) 02/09/2024    Fibromyalgia 02/09/2024    Neck pain 02/09/2024    Chronic pain syndrome 02/09/2024    Mood swings 12/01/2023    Radiculopathy, lumbosacral region 11/15/2023    Radiculopathy, cervical region 11/15/2023    Lumbosacral radiculitis 11/14/2023    Cervical radiculitis 10/26/2023    Daytime somnolence 09/01/2023    Irregular menses 09/01/2023    Perimenopause 09/01/2023    Schatzki's ring 09/01/2023    Ankylosing spondylitis (Multi) 05/18/2023    Rheumatoid arthritis (Multi) 05/15/2023    Dysphagia 06/21/2022    Gastroesophageal reflux disease without esophagitis 06/21/2022        Medications  Current Outpatient Medications   Medication Instructions    cholecalciferol (Vitamin D3) 5,000 Units tablet oral, Daily    Cosentyx Pen 150 mg/mL self-injector pen 2 mL (300 mg).    cyanocobalamin (Vitamin B-12) 100 mcg tablet oral, As directed    DULoxetine (CYMBALTA) 60 mg, oral, 2 times daily    iron,carb/vit C/vit B12/folic (IRON 100 PLUS ORAL) " oral    methocarbamol (ROBAXIN) 750 mg, oral, 4 times daily PRN    naloxone (Narcan) 4 mg/0.1 mL nasal spray 1 spray, nasal, As needed    norethindrone-e.estradioL-iron (Lo Loestrin) 1 mg-10 mcg (24)/10 mcg (2) tablet 1 tablet, oral, Daily    omeprazole (PriLOSEC) 40 mg DR capsule oral    predniSONE (DELTASONE) 60 mg, oral, Daily    traMADol (ULTRAM) 100 mg, oral, Every 8 hours PRN    traZODone (DESYREL) 100 mg, oral, Nightly PRN        Surgical History  She has a past surgical history that includes Procedure Not Performed; Colonoscopy; Dilation and curettage of uterus; and Epidural block injection (Multiple).     Social History  She reports that she quit smoking about 4 years ago. Her smoking use included cigarettes. She has a 30 pack-year smoking history. She has been exposed to tobacco smoke. She has never used smokeless tobacco. She reports that she does not currently use alcohol after a past usage of about 2.0 standard drinks of alcohol per week. She reports that she does not use drugs.    Family History  Family History   Problem Relation Name Age of Onset    Thyroid disease Mother      Colon cancer Father Mynor     Cancer Father Mynor     Thyroid disease Sister      Cerebral palsy Brother          1/2 brother    Stomach cancer Maternal Grandmother      Breast cancer Other AUNT     Heart attack Other UNCLE     Arthritis Paternal Grandmother Shaniqua         Allergies  Patient has no known allergies.    ROS  Review of Systems   Musculoskeletal:  Positive for back pain.        Last Recorded Vitals  /84 (BP Location: Right arm, Patient Position: Sitting, BP Cuff Size: Adult)   Pulse 104   Temp 35.7 °C (96.2 °F)   Resp 20   Wt 63 kg (139 lb)   SpO2 99%     Physical Exam  Vitals and nursing note reviewed.   Constitutional:       Appearance: Normal appearance.   Cardiovascular:      Rate and Rhythm: Normal rate and regular rhythm.      Pulses: Normal pulses.      Heart sounds: Normal heart sounds.    Pulmonary:      Effort: Pulmonary effort is normal.      Breath sounds: Normal breath sounds.   Musculoskeletal:      Lumbar back: Tenderness present. Decreased range of motion. Positive right straight leg raise test.      Comments: No numbness.    Neurological:      Mental Status: She is alert.         Relevant Results      Assessment/Plan   Meghan was seen today for back pain.  Diagnoses and all orders for this visit:  Acute midline low back pain with right-sided sciatica (Primary)  -     predniSONE (Deltasone) 20 mg tablet; Take 3 tablets (60 mg) by mouth once daily for 5 days.          COUNSELING      Medication education:              Education:  The patient is counseled regarding potential side-effects of any and all new medications             Understanding:  Patient expressed understanding             Adherence:  No barriers to adherence identified        Kavya Chen, APRN-CNP

## 2024-09-30 ENCOUNTER — OFFICE VISIT (OUTPATIENT)
Dept: PAIN MEDICINE | Facility: CLINIC | Age: 49
End: 2024-09-30
Payer: COMMERCIAL

## 2024-09-30 VITALS
BODY MASS INDEX: 27.29 KG/M2 | DIASTOLIC BLOOD PRESSURE: 73 MMHG | WEIGHT: 139 LBS | SYSTOLIC BLOOD PRESSURE: 110 MMHG | HEART RATE: 97 BPM | HEIGHT: 60 IN

## 2024-09-30 DIAGNOSIS — M79.7 FIBROMYALGIA: ICD-10-CM

## 2024-09-30 DIAGNOSIS — M54.50 CHRONIC MIDLINE LOW BACK PAIN WITHOUT SCIATICA: ICD-10-CM

## 2024-09-30 DIAGNOSIS — Z79.891 LONG TERM CURRENT USE OF OPIATE ANALGESIC: ICD-10-CM

## 2024-09-30 DIAGNOSIS — M54.17 LUMBOSACRAL RADICULITIS: Primary | ICD-10-CM

## 2024-09-30 DIAGNOSIS — M54.12 RADICULOPATHY, CERVICAL REGION: ICD-10-CM

## 2024-09-30 DIAGNOSIS — M46.1 INFLAMMATION OF SACROILIAC JOINT (CMS-HCC): ICD-10-CM

## 2024-09-30 DIAGNOSIS — G89.29 CHRONIC MIDLINE LOW BACK PAIN WITHOUT SCIATICA: ICD-10-CM

## 2024-09-30 PROCEDURE — 99214 OFFICE O/P EST MOD 30 MIN: CPT | Performed by: ANESTHESIOLOGY

## 2024-09-30 PROCEDURE — 3008F BODY MASS INDEX DOCD: CPT | Performed by: ANESTHESIOLOGY

## 2024-09-30 ASSESSMENT — ENCOUNTER SYMPTOMS
NUMBNESS: 1
PSYCHIATRIC NEGATIVE: 1
EYES NEGATIVE: 1
NECK PAIN: 1
CONSTITUTIONAL NEGATIVE: 1
HEMATOLOGIC/LYMPHATIC NEGATIVE: 1
ENDOCRINE NEGATIVE: 1
CARDIOVASCULAR NEGATIVE: 1
GASTROINTESTINAL NEGATIVE: 1
WEAKNESS: 1
RESPIRATORY NEGATIVE: 1
BACK PAIN: 1

## 2024-09-30 ASSESSMENT — PAIN SCALES - GENERAL: PAINLEVEL: 8

## 2024-09-30 NOTE — H&P (VIEW-ONLY)
PAIN MANAGEMENT FOLLOW-UP OFFICE NOTE    Date of Service: 9/30/2024    SUBJECTIVE    CHIEF COMPLAINT: LB pain    HISTORY OF PRESENT ILLNESS    Meghan Hernández is a 48 y.o. female with PMH MDD, RA, ankylosing spondylitis who presents for follow-up LB pain.     Pt presents to review LBP. Pt describes midline LBP present with sitting/standing distinct from her previous pains. Also notes recent MRI L-spine approval. TIM scheduled for 10/18    Pt denies new-onset numbness, weakness, bowel/bladder incontinence.  Pt denies recent infection, allergy to Latex/iodine/contrast. Patient is currently taking the following blood thinner(s): N/A    Procedure Log:  -BL LMBN RFA 7/11/24: 60% ongoing  -R SIJ CSI 1/25/24: 50% ongoing relief  -C7-T1 GINA 12/28/23: 80% relief 8 mo  -C7-T1 GINA 10/26/23: 100% relief RUE, 40% neck    REVIEW OF SYSTEMS  Review of Systems   Constitutional: Negative.    HENT: Negative.     Eyes: Negative.    Respiratory: Negative.     Cardiovascular: Negative.    Gastrointestinal: Negative.    Endocrine: Negative.    Musculoskeletal:  Positive for back pain and neck pain.   Skin: Negative.    Neurological:  Positive for weakness and numbness.   Hematological: Negative.    Psychiatric/Behavioral: Negative.         PAST MEDICAL HISTORY  Past Medical History:   Diagnosis Date    Ankylosing spondylitis of site in spine (Multi) May2023    AS (ankylosing spondylitis) (Multi)     Chronic pain disorder Long time    Headache Entire life    Low back pain In my 20s    Migraine Entire life    Neck pain In my20s    Osteoarthritis May2023    Rheumatoid arthritis (Multi)      Past Surgical History:   Procedure Laterality Date    COLONOSCOPY      DILATION AND CURETTAGE OF UTERUS      ENDOSCOPY PROCEDURE NOT PERFORMED      EPIDURAL BLOCK INJECTION  Multiple     Family History   Problem Relation Name Age of Onset    Thyroid disease Mother      Colon cancer Father Mynor     Cancer Father Mynor     Thyroid disease Sister       Cerebral palsy Brother          1/2 brother    Stomach cancer Maternal Grandmother      Breast cancer Other AUNT     Heart attack Other UNCLE     Arthritis Paternal Grandmother Shaniqua        CURRENT MEDICATIONS  Current Outpatient Medications   Medication Sig Dispense Refill    cholecalciferol (Vitamin D3) 5,000 Units tablet Take by mouth once daily.      Cosentyx Pen 150 mg/mL self-injector pen 2 mL (300 mg).      cyanocobalamin (Vitamin B-12) 100 mcg tablet Take by mouth. As directed      DULoxetine (Cymbalta) 60 mg DR capsule Take 1 capsule (60 mg) by mouth 2 times a day. 90 capsule 3    iron,carb/vit C/vit B12/folic (IRON 100 PLUS ORAL) Take by mouth.      naloxone (Narcan) 4 mg/0.1 mL nasal spray Administer 1 spray (4 mg) into affected nostril(s) if needed for opioid reversal or respiratory depression.      norethindrone-e.estradioL-iron (Lo Loestrin) 1 mg-10 mcg (24)/10 mcg (2) tablet Take 1 tablet by mouth once daily. 84 tablet 3    omeprazole (PriLOSEC) 40 mg DR capsule Take by mouth.      predniSONE (Deltasone) 20 mg tablet Take 3 tablets (60 mg) by mouth once daily for 5 days. 15 tablet 0    traMADol (Ultram) 50 mg tablet Take 2 tablets (100 mg) by mouth every 8 hours if needed for severe pain (7 - 10). 180 tablet 2    traZODone (Desyrel) 100 mg tablet Take 1 tablet (100 mg) by mouth as needed at bedtime for sleep.      methocarbamol (Robaxin) 750 mg tablet Take 1 tablet (750 mg) by mouth 4 times a day as needed for muscle spasms. 360 tablet 2     No current facility-administered medications for this visit.       ALLERGIES AND DRUG REACTIONS  No Known Allergies       OBJECTIVE  Visit Vitals  /73   Pulse 97   Ht 1.524 m (5')   Wt 63 kg (139 lb)   BMI 27.15 kg/m²   OB Status Perimenopausal   Smoking Status Former   BSA 1.63 m²       Last Recorded Pain Score (if available):                Physical Exam  Vitals and nursing note reviewed.     General: Sitting in chair, NAD  Head: NCAT  Eyes:  Sclera/conjunctiva clear, EOMI, PERRL  Nose/mouth: MMM  CV: palpable radial pulse RRR  Lungs: Good/equal chest excursion  Abdomen: Soft, ND  Ext: No cyanosis/edema  MSK: c-spine: anterior tilt, BL paraspinal m TTP, neg Spurling/Lhermitte, pain with turning  L-spine: alignment WNL, BL paraspinal m TTP, L-spine extension lmtd by pain    Neuro: AAOx3, CN grossly nl  Dermatome sensation to light touch  LEFT L1 (lower pelvis/upper thigh): WNL    RIGHT L1: WNL      LEFT L2 (upper thigh): WNL       RIGHT: L2:WNL      LEFT L3 (medial knee): WNL       RIGHT L3: WNL      LEFT L4 (superior medial malleolus): WNL       RIGHT L4: WNL      LEFT L5 (dorsal foot): WNL       RIGHT L5: WNL      LEFT S1 (lateral foot): WNL     RIGHT S1: WNL      LEFT S2 (popliteal fossa): WNL    RIGHT S2: WNL        Motor strength  LEFT L2 (hip flexion): 5/5   RIGHT L2: 5/5  LEFT L3 (knee extension): 5/5     RIGHT L3: 5/5  LEFT L4 (dorsiflexion): 5/5     RIGHT L4: 5/5  LEFT L5 (EHL extension): 5/5     RIGHT L5: 5/5  LEFT S1 (plantarflexion): 5/5     RIGHT S1: 5/5  LEFT S2 (knee flexion): 5/5      RIGHT S2: 5/5    Special testing  Seated slump test neg BL    Psych: affect nl  Skin: no rash/lesions      REVIEW OF LABORATORY DATA  I have reviewed the following lab results:  WBC   Date Value Ref Range Status   09/07/2021 6.5 4.5 - 11.0 K/UL Final     RBC   Date Value Ref Range Status   09/07/2021 4.48 4.0 - 4.9 M/UL Final     Hemoglobin   Date Value Ref Range Status   09/07/2021 13.1 12.0 - 15.0 GM/DL Final     Hematocrit   Date Value Ref Range Status   09/07/2021 41.5 36 - 44 % Final     MCV   Date Value Ref Range Status   09/07/2021 92.6 80 - 100 FL Final     MCH   Date Value Ref Range Status   09/07/2021 29.2 26 - 34 PG Final     MCHC   Date Value Ref Range Status   09/07/2021 31.6 31 - 37 % Final     Platelets   Date Value Ref Range Status   09/07/2021 246 150 - 450 K/UL Final     MPV   Date Value Ref Range Status   09/07/2021 11.0 7.0 - 12.6 CU Final  "    Sodium   Date Value Ref Range Status   09/07/2021 140 133 - 145 MMOL/L Final     Potassium   Date Value Ref Range Status   09/07/2021 4.2 3.4 - 5.1 MMOL/L Final     Bicarbonate   Date Value Ref Range Status   09/07/2021 24 24 - 31 MMOL/L Final     Urea Nitrogen   Date Value Ref Range Status   09/07/2021 4 (L) 8 - 25 MG/DL Final     Calcium   Date Value Ref Range Status   09/07/2021 9.4 8.5 - 10.4 MG/DL Final     No results found for: \"PROTIME\", \"PTT\", \"INR\", \"FIBRINOGEN\"      REVIEW OF RADIOLOGY   I have reviewed the following:  Radiology Studies           MRI c-spine 8/30/23:  There is loss of the normal cervical lordosis. No acute fracture is  identified. There is grade 1 anterolisthesis C4 on C5. There are posterior  osteophytes extending from the C3-C6 levels. No  STIR abnormalities are seen  within the marrow.     The visualized cord signal is grossly unremarkable.     C2-C3: No disc herniation. No significant canal or foraminal stenosis.  C3-C4: There is a posterior disc osteophyte complex. No significant canal or  foraminal stenosis.  C4-C5: There is a posterior disc osteophyte complex. No significant canal or  foraminal stenosis.  C5-C6: There is a posterior disc osteophyte complex. There is facet  osteoarthropathy. No significant canal stenosis. Mild right neural foraminal  narrowing.  C6-C7: There is a posterior disc osteophyte complex. There is facet  osteoarthropathy. No significant canal stenosis. Mild right neural foraminal  narrowing.  C7-T1: No disc herniation. No significant canal or foraminal stenosis.     The paravertebral soft tissues are unremarkable.     IMPRESSION:  Mild multilevel cervical spondylosis most severe at C5-C6 and C6-C7  minimally progressed as compared to the prior examination from 2014.            ASSESSMENT & PLAN  Meghan Hernández is a 48 y.o. female with PMH MDD, RA, ankylosing spondylitis who presents for F/U     1) Cervical radic  -Neck pain since 2021 radiating to right " upper extremity with subjective fourth and fifth digit numbness, tingling, weakness. Possible contribution from rheumatoid arthritis, ankylosing spondylitis  -Refractive to Tylenol, NSAIDs, muscle relaxants, Cymbalta, topical lidocaine, >6 w PT  -MRI C-spine 8/30/2023: Multilevel spondylosis featuring grade 1 C4-5 listhesis, severe facet arthropathy at C5-6 and C6-7 with right neuroforaminal stenosis  -C7-T1 GINA 12/28/23: 80% relief 8 mo  -Scheduled 10/18 for repeat C7-T1 GINA w/ IV sed to target pain generator as seen on imaging and minimize risk/likelihood of chronic opioid use and/or surgery    2) LBP  -Originally LBP since ~2008 with right leg pain, numbness, weakness- possible contribution from ankylosing spondylitis. BLE obj weakness now resolved.  -Refractive to Tylenol, NSAIDs, muscle relaxants, Cymbalta, topical lidocaine, chiropractics, >6 w PT.   -CT L-spine 12/18/23: mild disc bulging at L4-5, L5-S1 with mild R NFS at L4-5  -BL LMBN RFA 7/11/24: 60% ongoing relief  -New midline LBP worsening superimposed on hx chronic LBP- suspect vertebrogenic etiology. MRI L-spine to elucidate neuraxial pathology  -F/U after TIM once MRI complete    3) Sacroiliitis  -R-sided LBP reproduced on numerous R SIJ-provocative maneuvers on exam  -R SIJ CSI 1/25/24: 50% ongoing relief    4) Fibromyalgia  -Diffuse whole body pain for yrs on hx RA and ankylosing spondylitis on duloxetine  -Refractive to Tylenol, NSAIDs, muscle relaxants, Cymbalta, topical lidocaine, chiropractics, >6 w PT, gabapentin, pregabalin  -Cont methocarbamol 750 mg QID PRN    5) opioid mgmt  2/2 chronic neck and LBP refractive to conservative, interventional tx  -On tramadol 100 mg TID PRN  (30-60 MME) with good relief. Denies SE. Requests RF  - reviewed/appropriate. Reviewed UDS 5/13/24: OK. No sign of aberrant behavior. Consider UDS next RF        Discussed procedure risks/benefits in detail with patient. Pt meets medical necessity for procedure due  to failure of conservative measures. Reviewed procedural risks including bleeding, infection, nerve damage, paralysis. Also reviewed mitigating factors such as screening for infection/blood thinner use, sterile precautions, and image-guidance when applicable. All questions answered. Pt/guardian expressed understanding and choose to proceed                Sofiya Mandujano MD  Anesthesiologist & Interventional Pain Physician   [facility] [place]  O: [phone]  F: [phone]  3:57 PM  09/30/24

## 2024-09-30 NOTE — PROGRESS NOTES
PAIN MANAGEMENT FOLLOW-UP OFFICE NOTE    Date of Service: 9/30/2024    SUBJECTIVE    CHIEF COMPLAINT: LB pain    HISTORY OF PRESENT ILLNESS    Meghan Hernández is a 48 y.o. female with PMH MDD, RA, ankylosing spondylitis who presents for follow-up LB pain.     Pt presents to review LBP. Pt describes midline LBP present with sitting/standing distinct from her previous pains. Also notes recent MRI L-spine approval. TIM scheduled for 10/18    Pt denies new-onset numbness, weakness, bowel/bladder incontinence.  Pt denies recent infection, allergy to Latex/iodine/contrast. Patient is currently taking the following blood thinner(s): N/A    Procedure Log:  -BL LMBN RFA 7/11/24: 60% ongoing  -R SIJ CSI 1/25/24: 50% ongoing relief  -C7-T1 GINA 12/28/23: 80% relief 8 mo  -C7-T1 GINA 10/26/23: 100% relief RUE, 40% neck    REVIEW OF SYSTEMS  Review of Systems   Constitutional: Negative.    HENT: Negative.     Eyes: Negative.    Respiratory: Negative.     Cardiovascular: Negative.    Gastrointestinal: Negative.    Endocrine: Negative.    Musculoskeletal:  Positive for back pain and neck pain.   Skin: Negative.    Neurological:  Positive for weakness and numbness.   Hematological: Negative.    Psychiatric/Behavioral: Negative.         PAST MEDICAL HISTORY  Past Medical History:   Diagnosis Date    Ankylosing spondylitis of site in spine (Multi) May2023    AS (ankylosing spondylitis) (Multi)     Chronic pain disorder Long time    Headache Entire life    Low back pain In my 20s    Migraine Entire life    Neck pain In my20s    Osteoarthritis May2023    Rheumatoid arthritis (Multi)      Past Surgical History:   Procedure Laterality Date    COLONOSCOPY      DILATION AND CURETTAGE OF UTERUS      ENDOSCOPY PROCEDURE NOT PERFORMED      EPIDURAL BLOCK INJECTION  Multiple     Family History   Problem Relation Name Age of Onset    Thyroid disease Mother      Colon cancer Father Mynor     Cancer Father Mynor     Thyroid disease Sister       Cerebral palsy Brother          1/2 brother    Stomach cancer Maternal Grandmother      Breast cancer Other AUNT     Heart attack Other UNCLE     Arthritis Paternal Grandmother Shaniqua        CURRENT MEDICATIONS  Current Outpatient Medications   Medication Sig Dispense Refill    cholecalciferol (Vitamin D3) 5,000 Units tablet Take by mouth once daily.      Cosentyx Pen 150 mg/mL self-injector pen 2 mL (300 mg).      cyanocobalamin (Vitamin B-12) 100 mcg tablet Take by mouth. As directed      DULoxetine (Cymbalta) 60 mg DR capsule Take 1 capsule (60 mg) by mouth 2 times a day. 90 capsule 3    iron,carb/vit C/vit B12/folic (IRON 100 PLUS ORAL) Take by mouth.      naloxone (Narcan) 4 mg/0.1 mL nasal spray Administer 1 spray (4 mg) into affected nostril(s) if needed for opioid reversal or respiratory depression.      norethindrone-e.estradioL-iron (Lo Loestrin) 1 mg-10 mcg (24)/10 mcg (2) tablet Take 1 tablet by mouth once daily. 84 tablet 3    omeprazole (PriLOSEC) 40 mg DR capsule Take by mouth.      predniSONE (Deltasone) 20 mg tablet Take 3 tablets (60 mg) by mouth once daily for 5 days. 15 tablet 0    traMADol (Ultram) 50 mg tablet Take 2 tablets (100 mg) by mouth every 8 hours if needed for severe pain (7 - 10). 180 tablet 2    traZODone (Desyrel) 100 mg tablet Take 1 tablet (100 mg) by mouth as needed at bedtime for sleep.      methocarbamol (Robaxin) 750 mg tablet Take 1 tablet (750 mg) by mouth 4 times a day as needed for muscle spasms. 360 tablet 2     No current facility-administered medications for this visit.       ALLERGIES AND DRUG REACTIONS  No Known Allergies       OBJECTIVE  Visit Vitals  /73   Pulse 97   Ht 1.524 m (5')   Wt 63 kg (139 lb)   BMI 27.15 kg/m²   OB Status Perimenopausal   Smoking Status Former   BSA 1.63 m²       Last Recorded Pain Score (if available):                Physical Exam  Vitals and nursing note reviewed.     General: Sitting in chair, NAD  Head: NCAT  Eyes:  Sclera/conjunctiva clear, EOMI, PERRL  Nose/mouth: MMM  CV: palpable radial pulse RRR  Lungs: Good/equal chest excursion  Abdomen: Soft, ND  Ext: No cyanosis/edema  MSK: c-spine: anterior tilt, BL paraspinal m TTP, neg Spurling/Lhermitte, pain with turning  L-spine: alignment WNL, BL paraspinal m TTP, L-spine extension lmtd by pain    Neuro: AAOx3, CN grossly nl  Dermatome sensation to light touch  LEFT L1 (lower pelvis/upper thigh): WNL    RIGHT L1: WNL      LEFT L2 (upper thigh): WNL       RIGHT: L2:WNL      LEFT L3 (medial knee): WNL       RIGHT L3: WNL      LEFT L4 (superior medial malleolus): WNL       RIGHT L4: WNL      LEFT L5 (dorsal foot): WNL       RIGHT L5: WNL      LEFT S1 (lateral foot): WNL     RIGHT S1: WNL      LEFT S2 (popliteal fossa): WNL    RIGHT S2: WNL        Motor strength  LEFT L2 (hip flexion): 5/5   RIGHT L2: 5/5  LEFT L3 (knee extension): 5/5     RIGHT L3: 5/5  LEFT L4 (dorsiflexion): 5/5     RIGHT L4: 5/5  LEFT L5 (EHL extension): 5/5     RIGHT L5: 5/5  LEFT S1 (plantarflexion): 5/5     RIGHT S1: 5/5  LEFT S2 (knee flexion): 5/5      RIGHT S2: 5/5    Special testing  Seated slump test neg BL    Psych: affect nl  Skin: no rash/lesions      REVIEW OF LABORATORY DATA  I have reviewed the following lab results:  WBC   Date Value Ref Range Status   09/07/2021 6.5 4.5 - 11.0 K/UL Final     RBC   Date Value Ref Range Status   09/07/2021 4.48 4.0 - 4.9 M/UL Final     Hemoglobin   Date Value Ref Range Status   09/07/2021 13.1 12.0 - 15.0 GM/DL Final     Hematocrit   Date Value Ref Range Status   09/07/2021 41.5 36 - 44 % Final     MCV   Date Value Ref Range Status   09/07/2021 92.6 80 - 100 FL Final     MCH   Date Value Ref Range Status   09/07/2021 29.2 26 - 34 PG Final     MCHC   Date Value Ref Range Status   09/07/2021 31.6 31 - 37 % Final     Platelets   Date Value Ref Range Status   09/07/2021 246 150 - 450 K/UL Final     MPV   Date Value Ref Range Status   09/07/2021 11.0 7.0 - 12.6 CU Final  "    Sodium   Date Value Ref Range Status   09/07/2021 140 133 - 145 MMOL/L Final     Potassium   Date Value Ref Range Status   09/07/2021 4.2 3.4 - 5.1 MMOL/L Final     Bicarbonate   Date Value Ref Range Status   09/07/2021 24 24 - 31 MMOL/L Final     Urea Nitrogen   Date Value Ref Range Status   09/07/2021 4 (L) 8 - 25 MG/DL Final     Calcium   Date Value Ref Range Status   09/07/2021 9.4 8.5 - 10.4 MG/DL Final     No results found for: \"PROTIME\", \"PTT\", \"INR\", \"FIBRINOGEN\"      REVIEW OF RADIOLOGY   I have reviewed the following:  Radiology Studies           MRI c-spine 8/30/23:  There is loss of the normal cervical lordosis. No acute fracture is  identified. There is grade 1 anterolisthesis C4 on C5. There are posterior  osteophytes extending from the C3-C6 levels. No  STIR abnormalities are seen  within the marrow.     The visualized cord signal is grossly unremarkable.     C2-C3: No disc herniation. No significant canal or foraminal stenosis.  C3-C4: There is a posterior disc osteophyte complex. No significant canal or  foraminal stenosis.  C4-C5: There is a posterior disc osteophyte complex. No significant canal or  foraminal stenosis.  C5-C6: There is a posterior disc osteophyte complex. There is facet  osteoarthropathy. No significant canal stenosis. Mild right neural foraminal  narrowing.  C6-C7: There is a posterior disc osteophyte complex. There is facet  osteoarthropathy. No significant canal stenosis. Mild right neural foraminal  narrowing.  C7-T1: No disc herniation. No significant canal or foraminal stenosis.     The paravertebral soft tissues are unremarkable.     IMPRESSION:  Mild multilevel cervical spondylosis most severe at C5-C6 and C6-C7  minimally progressed as compared to the prior examination from 2014.            ASSESSMENT & PLAN  Meghan Hernández is a 48 y.o. female with PMH MDD, RA, ankylosing spondylitis who presents for F/U     1) Cervical radic  -Neck pain since 2021 radiating to right " upper extremity with subjective fourth and fifth digit numbness, tingling, weakness. Possible contribution from rheumatoid arthritis, ankylosing spondylitis  -Refractive to Tylenol, NSAIDs, muscle relaxants, Cymbalta, topical lidocaine, >6 w PT  -MRI C-spine 8/30/2023: Multilevel spondylosis featuring grade 1 C4-5 listhesis, severe facet arthropathy at C5-6 and C6-7 with right neuroforaminal stenosis  -C7-T1 GINA 12/28/23: 80% relief 8 mo  -Scheduled 10/18 for repeat C7-T1 GINA w/ IV sed to target pain generator as seen on imaging and minimize risk/likelihood of chronic opioid use and/or surgery    2) LBP  -Originally LBP since ~2008 with right leg pain, numbness, weakness- possible contribution from ankylosing spondylitis. BLE obj weakness now resolved.  -Refractive to Tylenol, NSAIDs, muscle relaxants, Cymbalta, topical lidocaine, chiropractics, >6 w PT.   -CT L-spine 12/18/23: mild disc bulging at L4-5, L5-S1 with mild R NFS at L4-5  -BL LMBN RFA 7/11/24: 60% ongoing relief  -New midline LBP worsening superimposed on hx chronic LBP- suspect vertebrogenic etiology. MRI L-spine to elucidate neuraxial pathology  -F/U after TIM once MRI complete    3) Sacroiliitis  -R-sided LBP reproduced on numerous R SIJ-provocative maneuvers on exam  -R SIJ CSI 1/25/24: 50% ongoing relief    4) Fibromyalgia  -Diffuse whole body pain for yrs on hx RA and ankylosing spondylitis on duloxetine  -Refractive to Tylenol, NSAIDs, muscle relaxants, Cymbalta, topical lidocaine, chiropractics, >6 w PT, gabapentin, pregabalin  -Cont methocarbamol 750 mg QID PRN    5) opioid mgmt  2/2 chronic neck and LBP refractive to conservative, interventional tx  -On tramadol 100 mg TID PRN  (30-60 MME) with good relief. Denies SE. Requests RF  - reviewed/appropriate. Reviewed UDS 5/13/24: OK. No sign of aberrant behavior. Consider UDS next RF        Discussed procedure risks/benefits in detail with patient. Pt meets medical necessity for procedure due  to failure of conservative measures. Reviewed procedural risks including bleeding, infection, nerve damage, paralysis. Also reviewed mitigating factors such as screening for infection/blood thinner use, sterile precautions, and image-guidance when applicable. All questions answered. Pt/guardian expressed understanding and choose to proceed                Sofiya Mandujano MD  Anesthesiologist & Interventional Pain Physician   [facility] [place]  O: [phone]  F: [phone]  3:57 PM  09/30/24

## 2024-09-30 NOTE — PROGRESS NOTES
MEDICATION NAME: Tramadol  STRENGTH: 50 mg  LAST FILL DATE: 24  DATE LAST TAKEN: 24  QUANTITY FILLED: 180  QUANTITY REMAININ  COUNT COMPLETED BY: ROMA DIAZ MA and ELISEO OJEDA RN      UDS LAST COMPLETED:   CONTROLLED SUBSTANCES AGREEMENT LAST SIGNED:   ORT LAST COMPLETED:  Modified Osw[employer] disability form filled out annually.

## 2024-10-01 DIAGNOSIS — F32.A DEPRESSION, UNSPECIFIED DEPRESSION TYPE: Primary | ICD-10-CM

## 2024-10-01 NOTE — TELEPHONE ENCOUNTER
Prescription request received and populated   Pharmacy populated  Last Office Visit: 9/26/24 with Lorie Chen for acute ov  6/20/24 LAST CPE

## 2024-10-02 ENCOUNTER — APPOINTMENT (OUTPATIENT)
Dept: RADIOLOGY | Facility: CLINIC | Age: 49
End: 2024-10-02
Payer: COMMERCIAL

## 2024-10-03 RX ORDER — TRAZODONE HYDROCHLORIDE 100 MG/1
100 TABLET ORAL NIGHTLY PRN
Qty: 90 TABLET | Refills: 1 | Status: SHIPPED | OUTPATIENT
Start: 2024-10-03

## 2024-10-10 ENCOUNTER — HOSPITAL ENCOUNTER (OUTPATIENT)
Dept: RADIOLOGY | Facility: HOSPITAL | Age: 49
Discharge: HOME | End: 2024-10-10
Payer: COMMERCIAL

## 2024-10-10 DIAGNOSIS — M45.0 ANKYLOSING SPONDYLITIS OF MULTIPLE SITES IN SPINE (MULTI): ICD-10-CM

## 2024-10-10 PROCEDURE — 75571 CT HRT W/O DYE W/CA TEST: CPT

## 2024-10-15 ENCOUNTER — HOSPITAL ENCOUNTER (OUTPATIENT)
Dept: RADIOLOGY | Facility: CLINIC | Age: 49
Discharge: HOME | End: 2024-10-15
Payer: COMMERCIAL

## 2024-10-15 DIAGNOSIS — M54.17 LUMBOSACRAL RADICULITIS: ICD-10-CM

## 2024-10-15 DIAGNOSIS — G89.29 CHRONIC MIDLINE LOW BACK PAIN WITHOUT SCIATICA: ICD-10-CM

## 2024-10-15 DIAGNOSIS — M54.50 CHRONIC MIDLINE LOW BACK PAIN WITHOUT SCIATICA: ICD-10-CM

## 2024-10-15 PROCEDURE — 72148 MRI LUMBAR SPINE W/O DYE: CPT

## 2024-10-15 PROCEDURE — 72148 MRI LUMBAR SPINE W/O DYE: CPT | Performed by: RADIOLOGY

## 2024-10-17 ENCOUNTER — HOSPITAL ENCOUNTER (OUTPATIENT)
Dept: GASTROENTEROLOGY | Facility: HOSPITAL | Age: 49
Discharge: HOME | End: 2024-10-17
Payer: COMMERCIAL

## 2024-10-17 VITALS
WEIGHT: 145 LBS | BODY MASS INDEX: 28.47 KG/M2 | HEART RATE: 81 BPM | TEMPERATURE: 97.5 F | DIASTOLIC BLOOD PRESSURE: 68 MMHG | SYSTOLIC BLOOD PRESSURE: 96 MMHG | HEIGHT: 60 IN | RESPIRATION RATE: 17 BRPM | OXYGEN SATURATION: 100 %

## 2024-10-17 DIAGNOSIS — M54.12 CERVICAL RADICULITIS: ICD-10-CM

## 2024-10-17 PROCEDURE — 3700000012 HC SEDATION LEVEL 5+ TIME - INITIAL 15 MINUTES 5/> YEARS

## 2024-10-17 PROCEDURE — 2500000005 HC RX 250 GENERAL PHARMACY W/O HCPCS: Performed by: ANESTHESIOLOGY

## 2024-10-17 PROCEDURE — 99152 MOD SED SAME PHYS/QHP 5/>YRS: CPT | Performed by: ANESTHESIOLOGY

## 2024-10-17 PROCEDURE — 2550000001 HC RX 255 CONTRASTS: Performed by: ANESTHESIOLOGY

## 2024-10-17 PROCEDURE — 62321 NJX INTERLAMINAR CRV/THRC: CPT | Performed by: ANESTHESIOLOGY

## 2024-10-17 PROCEDURE — 2500000004 HC RX 250 GENERAL PHARMACY W/ HCPCS (ALT 636 FOR OP/ED): Performed by: ANESTHESIOLOGY

## 2024-10-17 RX ORDER — TRIAMCINOLONE ACETONIDE 40 MG/ML
INJECTION, SUSPENSION INTRA-ARTICULAR; INTRAMUSCULAR AS NEEDED
Status: COMPLETED | OUTPATIENT
Start: 2024-10-17 | End: 2024-10-17

## 2024-10-17 RX ORDER — LIDOCAINE HYDROCHLORIDE 10 MG/ML
INJECTION, SOLUTION EPIDURAL; INFILTRATION; INTRACAUDAL; PERINEURAL AS NEEDED
Status: COMPLETED | OUTPATIENT
Start: 2024-10-17 | End: 2024-10-17

## 2024-10-17 RX ORDER — MIDAZOLAM HYDROCHLORIDE 1 MG/ML
INJECTION, SOLUTION INTRAMUSCULAR; INTRAVENOUS AS NEEDED
Status: COMPLETED | OUTPATIENT
Start: 2024-10-17 | End: 2024-10-17

## 2024-10-17 RX ORDER — SODIUM CHLORIDE 9 MG/ML
INJECTION, SOLUTION INTRAMUSCULAR; INTRAVENOUS; SUBCUTANEOUS AS NEEDED
Status: COMPLETED | OUTPATIENT
Start: 2024-10-17 | End: 2024-10-17

## 2024-10-17 RX ORDER — FENTANYL CITRATE 50 UG/ML
INJECTION, SOLUTION INTRAMUSCULAR; INTRAVENOUS AS NEEDED
Status: COMPLETED | OUTPATIENT
Start: 2024-10-17 | End: 2024-10-17

## 2024-10-17 ASSESSMENT — ENCOUNTER SYMPTOMS
DEPRESSION: 0
LOSS OF SENSATION IN FEET: 0
OCCASIONAL FEELINGS OF UNSTEADINESS: 1

## 2024-10-17 ASSESSMENT — PAIN SCALES - GENERAL
PAINLEVEL_OUTOF10: 8

## 2024-10-17 ASSESSMENT — PAIN - FUNCTIONAL ASSESSMENT
PAIN_FUNCTIONAL_ASSESSMENT: 0-10

## 2024-10-17 ASSESSMENT — PAIN DESCRIPTION - DESCRIPTORS: DESCRIPTORS: STABBING

## 2024-10-17 NOTE — INTERVAL H&P NOTE
H&P reviewed. The patient was examined and there are no changes to the H&P. Meghan Hernández is a 48 y.o. female with PMH MDD, RA, ankylosing spondylitis who presents for repeat C7-T1 GINA w/ IV sed to target pain generator as seen on imaging and minimize risk/likelihood of chronic opioid use and/or surgery. Patient's pain stable and persistent from last visit.  Appropriately NPO. ASA 2. No personal/family hx issues with anesthesia. Denies allergies to Latex, steroids, local anesthetics, or iodine/contrast. Denies being on blood thinners. Not diabetic.  Denies fever, chills, NS, CP, SOB, cough, N/V.    Discussed procedure risks/benefits in detail with patient. Pt meets medical necessity for procedure due to failure of conservative measures. Reviewed procedural risks including bleeding, infection, nerve damage, paralysis. Also reviewed mitigating factors such as screening for infection/blood thinner use, sterile precautions, and image-guidance when applicable. All questions answered. Pt/guardian expressed understanding and choose to proceed      Sofiya Mandujano MD  Anesthesiologist & Interventional Pain Physician   [facility] [place]  O: [phone]  F: [phone]  9:24 AM  10/17/24

## 2024-10-17 NOTE — POST-PROCEDURE NOTE
Family at bedside. Discharge instructions reviewed. All questions answered.     Iv removed by this RN. Tip intact. Bandaid applied. Wheelchair discharge by Cornelio Sy. Family accompanied.

## 2024-10-17 NOTE — DISCHARGE INSTRUCTIONS
DISCHARGE INSTRUCTIONS FOR INJECTIONS     You underwent a cervical epidural steroid injection today    Aftermost injections, it is recommended that you relax and limit your activity for the remainder of the day unless you have been told otherwise by your pain physician.  You should not drive a car, operate machinery, or make important legal decisions unless otherwise directed by your pain physician.  You may resume your normal activity, including exercise, tomorrow.      Keep a written pain diary of how much pain relief you experienced following the injection procedure and the length of time of pain relief you experienced pain relief. Following diagnostic injections like medial branch nerve blocks, sacroiliac joint blocks, stellate ganglion injections and other blocks, it is very important you record the specific amount of pain relief you experienced immediately after the injectionand how long it lasted. Your doctor will ask you for this information at your follow up visit.     For all injections, please keep the injection site dry and inspect the site for a couple of days. You may remove the Band-Aid the day of the injection at any time.     Some discomfort, bruising or slight swelling may occur at the injection site. This is not abnormal if it occurs.  If needed you may:    -Take over the counter medication such as Tylenol or Motrin.   -Apply an ice pack for 30 minutes, 2 to 3 times a day for the first 24 hours.     You may shower today; no soaking baths, hot tubs, whirlpools or swimming pools for two days.      If you are given steroids in your injection, it may take 3-5 days for the steroid medication to take effect. You may notice a worsening of your symptoms for 1-2 days after the injection. This is not abnormal.  You may use acetaminophen, ibuprofen, or prescription medication that your doctor may have prescribed for you if you need to do so.     A few common side effects of steroids include facial flushing,  sweating, restlessness, irritability,difficulty sleeping, increase in blood sugar, and increased blood pressure. If you have diabetes, please monitor your blood sugar at least once a day for at least 5 days. If you have poorly controlled high blood pressure, monitoryour blood pressure for at least 2 days and contact your primary care physician if these numbers are unusually high for you.      If you take aspirin or non-steroidal anti-inflammatory drugs (examples are Motrin, Advil, ibuprofen, Naprosyn, Voltaren, Relafen, etc.) you may restart these this evening, but stop taking it 3 days before your next appointment, unless instructed otherwiseby your physician.      You do not need to discontinue non-aspirin-containing pain medications prior to an injection (examples: Celebrex, tramadol, hydrocodone and acetaminophen).      If you take a blood thinning medication (Coumadin, Lovenox, Fragmin,Ticlid, Plavix, Pradaxa, etc.), please discuss this with your primary care physician/cardiologist and your pain physician. These medications MUST be discontinued before you can have an injection safely, without the risk of uncontrolled bleeding. If these medications are not discontinued for an appropriate period of time, you will not be able to receivean injection.      If you are taking Coumadin, please have your INR checked the morning of your procedure and bringthe result to your appointment unless otherwise instructed. If your INR is over 1.2, your injection may need to be rescheduled to avoid uncontrolled bleeding from the needle placement.     Call [facility] at [phone] between 8am-4pm Monday - Friday if you are experiencing the following:    If you received an epidural or spinal injection:    -Headache that doesnot go away with medicine, is worse when sitting or standing up, and is greatly relieved upon lying down.   -Severe pain worse than or different than your baseline pain.   -Chills or fever  (101º F or greater).   -Drainage or signs of infection at the injection site     Go directly to the Emergency Department if you are experiencing the following and received an epidural or spinal injection:   -Abrupt weakness or progressive weakness in your legs that starts after you leave the clinic.   -Abrupt severe or worsening numbness in your legs.   -Inability to urinate after the injection or loss of bowel or bladder control without the urge to defecate or urinate.     If you have a clinical question that cannot wait until your next appointment, please call [phone] between 8am-4pm Monday - Friday or send a [facility] [employer] message. We do our best to return all non-emergency messages within 24 hours, Monday - Friday. A nurse or physician will return your message.      If you need to cancel an appointment, please call the scheduling staff at [phone] during normal business hours or leave a message at least 24 hours in advance.     If you are going to be sedated for your next procedure, you MUST have responsible adult who can legally drive accompany you home. You cannot eat or drink for eight hours prior to the planned procedure if you are going to receive sedation. You may take your non-blood thinning medications with a small sip of water.

## 2024-10-18 ENCOUNTER — TELEPHONE (OUTPATIENT)
Dept: PRIMARY CARE | Facility: CLINIC | Age: 49
End: 2024-10-18
Payer: COMMERCIAL

## 2024-10-18 ENCOUNTER — APPOINTMENT (OUTPATIENT)
Dept: PAIN MEDICINE | Facility: CLINIC | Age: 49
End: 2024-10-18
Payer: COMMERCIAL

## 2024-10-18 NOTE — TELEPHONE ENCOUNTER
PT HAD MRI DONE TUESDAY ORDERED BY HER PAIN MANAGEMENT DOCTOR STATES SHE HAS A MASS ON HER KIDNEY WOULD LIKE MD TO LOOK AT RESULTS.  PT REFUSED TO MAKE APPOINTMENT JUST WANTED TO SEND A MESSAGE TO PROVIDER ABOUT THIS

## 2024-11-04 ENCOUNTER — OFFICE VISIT (OUTPATIENT)
Dept: PRIMARY CARE | Facility: CLINIC | Age: 49
End: 2024-11-04
Payer: COMMERCIAL

## 2024-11-04 ENCOUNTER — LAB (OUTPATIENT)
Dept: LAB | Facility: LAB | Age: 49
End: 2024-11-04
Payer: COMMERCIAL

## 2024-11-04 VITALS
SYSTOLIC BLOOD PRESSURE: 108 MMHG | DIASTOLIC BLOOD PRESSURE: 70 MMHG | BODY MASS INDEX: 25.84 KG/M2 | OXYGEN SATURATION: 98 % | WEIGHT: 131.6 LBS | HEIGHT: 60 IN | TEMPERATURE: 97.7 F | HEART RATE: 90 BPM

## 2024-11-04 DIAGNOSIS — R63.4 UNINTENTIONAL WEIGHT LOSS: ICD-10-CM

## 2024-11-04 DIAGNOSIS — R63.4 UNINTENTIONAL WEIGHT LOSS: Primary | ICD-10-CM

## 2024-11-04 DIAGNOSIS — N28.1 CYST OF LEFT KIDNEY: ICD-10-CM

## 2024-11-04 LAB
ALBUMIN SERPL BCP-MCNC: 4.2 G/DL (ref 3.4–5)
ALP SERPL-CCNC: 52 U/L (ref 33–110)
ALT SERPL W P-5'-P-CCNC: 8 U/L (ref 7–45)
ANION GAP SERPL CALCULATED.3IONS-SCNC: 11 MMOL/L (ref 10–20)
AST SERPL W P-5'-P-CCNC: 11 U/L (ref 9–39)
BILIRUB SERPL-MCNC: 0.5 MG/DL (ref 0–1.2)
BUN SERPL-MCNC: 4 MG/DL (ref 6–23)
CALCIUM SERPL-MCNC: 8.8 MG/DL (ref 8.6–10.3)
CHLORIDE SERPL-SCNC: 101 MMOL/L (ref 98–107)
CO2 SERPL-SCNC: 27 MMOL/L (ref 21–32)
CREAT SERPL-MCNC: 0.62 MG/DL (ref 0.5–1.05)
EGFRCR SERPLBLD CKD-EPI 2021: >90 ML/MIN/1.73M*2
ERYTHROCYTE [DISTWIDTH] IN BLOOD BY AUTOMATED COUNT: 12.8 % (ref 11.5–14.5)
EST. AVERAGE GLUCOSE BLD GHB EST-MCNC: 103 MG/DL
GLUCOSE SERPL-MCNC: 100 MG/DL (ref 74–99)
HBA1C MFR BLD: 5.2 %
HCT VFR BLD AUTO: 40.5 % (ref 36–46)
HGB BLD-MCNC: 13.7 G/DL (ref 12–16)
MCH RBC QN AUTO: 30 PG (ref 26–34)
MCHC RBC AUTO-ENTMCNC: 33.8 G/DL (ref 32–36)
MCV RBC AUTO: 89 FL (ref 80–100)
NRBC BLD-RTO: 0 /100 WBCS (ref 0–0)
PLATELET # BLD AUTO: 318 X10*3/UL (ref 150–450)
POTASSIUM SERPL-SCNC: 3.8 MMOL/L (ref 3.5–5.3)
PROT SERPL-MCNC: 6.7 G/DL (ref 6.4–8.2)
RBC # BLD AUTO: 4.56 X10*6/UL (ref 4–5.2)
SODIUM SERPL-SCNC: 135 MMOL/L (ref 136–145)
TSH SERPL-ACNC: 2.4 MIU/L (ref 0.44–3.98)
WBC # BLD AUTO: 7.2 X10*3/UL (ref 4.4–11.3)

## 2024-11-04 PROCEDURE — 36415 COLL VENOUS BLD VENIPUNCTURE: CPT

## 2024-11-04 PROCEDURE — 80053 COMPREHEN METABOLIC PANEL: CPT

## 2024-11-04 PROCEDURE — 3008F BODY MASS INDEX DOCD: CPT | Performed by: FAMILY MEDICINE

## 2024-11-04 PROCEDURE — 1036F TOBACCO NON-USER: CPT | Performed by: FAMILY MEDICINE

## 2024-11-04 PROCEDURE — 99214 OFFICE O/P EST MOD 30 MIN: CPT | Performed by: FAMILY MEDICINE

## 2024-11-04 PROCEDURE — 83036 HEMOGLOBIN GLYCOSYLATED A1C: CPT

## 2024-11-04 PROCEDURE — 85027 COMPLETE CBC AUTOMATED: CPT

## 2024-11-04 PROCEDURE — 84443 ASSAY THYROID STIM HORMONE: CPT

## 2024-11-04 ASSESSMENT — PAIN SCALES - GENERAL: PAINLEVEL_OUTOF10: 6

## 2024-11-04 ASSESSMENT — LIFESTYLE VARIABLES: HOW MANY STANDARD DRINKS CONTAINING ALCOHOL DO YOU HAVE ON A TYPICAL DAY: PATIENT DOES NOT DRINK

## 2024-11-04 NOTE — PROGRESS NOTES
"History Of Present Illness  Meghan Hernández is a 49 y.o. female presenting for Weight Loss (First noticed rapid weight loss beginning of October 2024./Pt declined flu vacc.)  .    HPI   Concern about weight loss, 20 lbs in 5 months since last visit in june.  Eating and snack all day long due to stress.   Eating more, full meals and then eating sweets.  Has some LUQ discomfort, thought due to gas.  No fever, chills, night sweats.  No melena, hematochezia, diarrhea or abdominal pain or nausea.  Colonoscopy 2022 with 5 year surveillance.  Duloxetine increased from 60mg once daily to twice daily at June 2024 visit.    Patient also anxious, had an MRI of the lumbar spine without contrast on 10/15/2024 ordered by her pain management doctor, which showed an incidental \" partially imaged cystic focus arising from the upper pole of the left kidney\".    Past Medical History  Patient Active Problem List    Diagnosis Date Noted    Vitamin D deficiency 06/20/2024    Sleep-related movement disorder 02/09/2024    Internal hemorrhoids 02/09/2024    Inflammation of sacroiliac joint (CMS-HCC) 02/09/2024    Fibromyalgia 02/09/2024    Neck pain 02/09/2024    Chronic pain syndrome 02/09/2024    Mood swings 12/01/2023    Radiculopathy, lumbosacral region 11/15/2023    Radiculopathy, cervical region 11/15/2023    Lumbosacral radiculitis 11/14/2023    Cervical radiculitis 10/26/2023    Daytime somnolence 09/01/2023    Irregular menses 09/01/2023    Perimenopause 09/01/2023    Schatzki's ring 09/01/2023    Ankylosing spondylitis 05/18/2023    Rheumatoid arthritis 05/15/2023    Dysphagia 06/21/2022    Gastroesophageal reflux disease without esophagitis 06/21/2022        Medications  Current Outpatient Medications   Medication Sig Dispense Refill    cholecalciferol (Vitamin D3) 5,000 Units tablet Take by mouth once daily.      Cosentyx Pen 150 mg/mL self-injector pen 2 mL (300 mg).      cyanocobalamin (Vitamin B-12) 100 mcg tablet Take by mouth. " As directed      DULoxetine (Cymbalta) 60 mg DR capsule Take 1 capsule (60 mg) by mouth 2 times a day. 90 capsule 3    iron,carb/vit C/vit B12/folic (IRON 100 PLUS ORAL) Take by mouth.      naloxone (Narcan) 4 mg/0.1 mL nasal spray Administer 1 spray (4 mg) into affected nostril(s) if needed for opioid reversal or respiratory depression.      norethindrone-e.estradioL-iron (Lo Loestrin) 1 mg-10 mcg (24)/10 mcg (2) tablet Take 1 tablet by mouth once daily. 84 tablet 3    omeprazole (PriLOSEC) 40 mg DR capsule Take by mouth.      traMADol (Ultram) 50 mg tablet Take 2 tablets (100 mg) by mouth every 8 hours if needed for severe pain (7 - 10). 180 tablet 2    traZODone (Desyrel) 100 mg tablet Take 1 tablet (100 mg) by mouth as needed at bedtime for sleep. 90 tablet 1    methocarbamol (Robaxin) 750 mg tablet Take 1 tablet (750 mg) by mouth 4 times a day as needed for muscle spasms. 360 tablet 2     No current facility-administered medications for this visit.        Surgical History  She has a past surgical history that includes Procedure Not Performed; Colonoscopy; Dilation and curettage of uterus; and Epidural block injection (Multiple).     Social History  She reports that she quit smoking about 5 years ago. Her smoking use included cigarettes. She has a 30 pack-year smoking history. She has been exposed to tobacco smoke. She has never used smokeless tobacco. She reports that she does not currently use alcohol after a past usage of about 2.0 standard drinks of alcohol per week. She reports that she does not use drugs.    Family History  Family History   Problem Relation Name Age of Onset    Thyroid disease Mother      Colon cancer Father Mynor     Cancer Father Mynor     Thyroid disease Sister      Cerebral palsy Brother          1/2 brother    Stomach cancer Maternal Grandmother      Breast cancer Other AUNT     Heart attack Other UNCLE     Arthritis Paternal Grandmother Shaniqua         Allergies  Patient has no known  "allergies.    Immunizations  Immunization History   Administered Date(s) Administered    Moderna SARS-CoV-2 Vaccination 04/19/2021, 05/17/2021, 01/22/2022    Tdap vaccine, age 7 year and older (BOOSTRIX, ADACEL) 06/20/2024        ROS  Negative, except as discussed in HPI     Vitals  /70   Pulse 90   Temp 36.5 °C (97.7 °F)   Ht 1.524 m (5')   Wt 59.7 kg (131 lb 9.6 oz)   SpO2 98%   BMI 25.70 kg/m²      Physical Exam  Vitals and nursing note reviewed.   Constitutional:       General: She is not in acute distress.     Appearance: Normal appearance.   Cardiovascular:      Rate and Rhythm: Normal rate and regular rhythm.      Heart sounds: Normal heart sounds.   Pulmonary:      Effort: No respiratory distress.      Breath sounds: Normal breath sounds.   Neurological:      General: No focal deficit present.      Mental Status: She is alert. Mental status is at baseline.         Relevant Results  Lab Results   Component Value Date    WBC 6.5 09/07/2021    WBC 8.2 11/11/2019    HGB 13.1 09/07/2021    HGB 13.0 11/11/2019    HCT 41.5 09/07/2021    HCT 40.4 11/11/2019    MCV 92.6 09/07/2021    MCV 90.2 11/11/2019     09/07/2021     11/11/2019     Lab Results   Component Value Date     09/07/2021     11/11/2019    K 4.2 09/07/2021    K 3.6 11/11/2019     09/07/2021     11/11/2019    CO2 24 09/07/2021    CO2 27 11/11/2019    BUN 4 (L) 09/07/2021    BUN 4 (L) 11/11/2019    CREATININE 0.6 09/07/2021    CREATININE 0.8 11/11/2019    CALCIUM 9.4 09/07/2021    CALCIUM 9.7 11/11/2019    PROT 7.1 09/07/2021    PROT 7.7 11/11/2019    BILITOT 0.3 09/07/2021    BILITOT 0.2 11/11/2019    ALKPHOS 76 09/07/2021    ALKPHOS 86 11/11/2019    ALT 8 09/07/2021    ALT 15 11/11/2019    AST 17 09/07/2021    AST 15 11/11/2019    GLUCOSE 103 (H) 09/07/2021    GLUCOSE 122 (H) 11/11/2019     No results found for: \"HGBA1C\"  Lab Results   Component Value Date    TSH 1.64 09/07/2021      Lab Results "   Component Value Date    CHOL 185 06/26/2024    TRIG 61 06/26/2024    HDL 64.0 06/26/2024           Assessment/Plan   Meghan was seen today for weight loss.  Diagnoses and all orders for this visit:  Unintentional weight loss (Primary)  -     TSH with reflex to Free T4 if abnormal; Future  -     Comprehensive Metabolic Panel; Future  -     CBC; Future  -     Hemoglobin A1C; Future  Cyst of left kidney  -     US renal complete; Future         Counseling:   Medication education:   -Education:  The patient is counseled regarding potential side-effects of any and all new medications  -Understanding:  Patient expressed understanding of information discussed today  -Adherence:  No barriers to adherence identified    Final treatment plan is a result of shared decision making with patient.         Pepe Gee MD

## 2024-11-08 ENCOUNTER — HOSPITAL ENCOUNTER (OUTPATIENT)
Dept: RADIOLOGY | Facility: HOSPITAL | Age: 49
Discharge: HOME | End: 2024-11-08
Payer: COMMERCIAL

## 2024-11-08 DIAGNOSIS — N28.1 CYST OF LEFT KIDNEY: ICD-10-CM

## 2024-11-08 PROCEDURE — 76770 US EXAM ABDO BACK WALL COMP: CPT

## 2024-12-02 ENCOUNTER — OFFICE VISIT (OUTPATIENT)
Dept: PAIN MEDICINE | Facility: CLINIC | Age: 49
End: 2024-12-02
Payer: COMMERCIAL

## 2024-12-02 VITALS
WEIGHT: 131 LBS | HEART RATE: 87 BPM | OXYGEN SATURATION: 98 % | SYSTOLIC BLOOD PRESSURE: 118 MMHG | HEIGHT: 60 IN | BODY MASS INDEX: 25.72 KG/M2 | DIASTOLIC BLOOD PRESSURE: 81 MMHG | RESPIRATION RATE: 20 BRPM

## 2024-12-02 DIAGNOSIS — G89.29 CHRONIC RIGHT-SIDED LOW BACK PAIN WITHOUT SCIATICA: Primary | ICD-10-CM

## 2024-12-02 DIAGNOSIS — Z79.891 LONG TERM (CURRENT) USE OF OPIATE ANALGESIC: ICD-10-CM

## 2024-12-02 DIAGNOSIS — M54.12 CERVICAL RADICULITIS: ICD-10-CM

## 2024-12-02 DIAGNOSIS — M54.50 CHRONIC RIGHT-SIDED LOW BACK PAIN WITHOUT SCIATICA: Primary | ICD-10-CM

## 2024-12-02 DIAGNOSIS — M46.1 SACROILIITIS, NOT ELSEWHERE CLASSIFIED (CMS-HCC): ICD-10-CM

## 2024-12-02 DIAGNOSIS — M79.7 FIBROMYALGIA: ICD-10-CM

## 2024-12-02 PROCEDURE — 80307 DRUG TEST PRSMV CHEM ANLYZR: CPT | Performed by: ANESTHESIOLOGY

## 2024-12-02 PROCEDURE — 3008F BODY MASS INDEX DOCD: CPT | Performed by: ANESTHESIOLOGY

## 2024-12-02 PROCEDURE — 99214 OFFICE O/P EST MOD 30 MIN: CPT | Performed by: ANESTHESIOLOGY

## 2024-12-02 PROCEDURE — 1036F TOBACCO NON-USER: CPT | Performed by: ANESTHESIOLOGY

## 2024-12-02 PROCEDURE — 80373 DRUG SCREENING TRAMADOL: CPT | Performed by: ANESTHESIOLOGY

## 2024-12-02 RX ORDER — HYDROCODONE BITARTRATE AND ACETAMINOPHEN 10; 325 MG/1; MG/1
1 TABLET ORAL 4 TIMES DAILY PRN
Qty: 120 TABLET | Refills: 0 | Status: SHIPPED | OUTPATIENT
Start: 2024-12-02 | End: 2025-01-01

## 2024-12-02 RX ORDER — BUTYROSPERMUM PARKII(SHEA BUTTER), SIMMONDSIA CHINENSIS (JOJOBA) SEED OIL, ALOE BARBADENSIS LEAF EXTRACT .01; 1; 3.5 G/100G; G/100G; G/100G
250 LIQUID TOPICAL 2 TIMES DAILY
COMMUNITY

## 2024-12-02 ASSESSMENT — ENCOUNTER SYMPTOMS
PSYCHIATRIC NEGATIVE: 1
NECK PAIN: 1
BACK PAIN: 1
HEMATOLOGIC/LYMPHATIC NEGATIVE: 1
WEAKNESS: 1
CONSTITUTIONAL NEGATIVE: 1
NUMBNESS: 1
GASTROINTESTINAL NEGATIVE: 1
RESPIRATORY NEGATIVE: 1
ENDOCRINE NEGATIVE: 1
CARDIOVASCULAR NEGATIVE: 1
EYES NEGATIVE: 1

## 2024-12-02 ASSESSMENT — PATIENT HEALTH QUESTIONNAIRE - PHQ9
6. FEELING BAD ABOUT YOURSELF - OR THAT YOU ARE A FAILURE OR HAVE LET YOURSELF OR YOUR FAMILY DOWN: SEVERAL DAYS
4. FEELING TIRED OR HAVING LITTLE ENERGY: NEARLY EVERY DAY
5. POOR APPETITE OR OVEREATING: NOT AT ALL
1. LITTLE INTEREST OR PLEASURE IN DOING THINGS: SEVERAL DAYS
10. IF YOU CHECKED OFF ANY PROBLEMS, HOW DIFFICULT HAVE THESE PROBLEMS MADE IT FOR YOU TO DO YOUR WORK, TAKE CARE OF THINGS AT HOME, OR GET ALONG WITH OTHER PEOPLE: SOMEWHAT DIFFICULT
8. MOVING OR SPEAKING SO SLOWLY THAT OTHER PEOPLE COULD HAVE NOTICED. OR THE OPPOSITE, BEING SO FIGETY OR RESTLESS THAT YOU HAVE BEEN MOVING AROUND A LOT MORE THAN USUAL: MORE THAN HALF THE DAYS
SUM OF ALL RESPONSES TO PHQ QUESTIONS 1-9: 10
SUM OF ALL RESPONSES TO PHQ9 QUESTIONS 1 AND 2: 3
3. TROUBLE FALLING OR STAYING ASLEEP OR SLEEPING TOO MUCH: NOT AT ALL
7. TROUBLE CONCENTRATING ON THINGS, SUCH AS READING THE NEWSPAPER OR WATCHING TELEVISION: SEVERAL DAYS
9. THOUGHTS THAT YOU WOULD BE BETTER OFF DEAD, OR OF HURTING YOURSELF: NOT AT ALL
2. FEELING DOWN, DEPRESSED OR HOPELESS: MORE THAN HALF THE DAYS

## 2024-12-02 ASSESSMENT — PAIN DESCRIPTION - DESCRIPTORS: DESCRIPTORS: STABBING

## 2024-12-02 ASSESSMENT — PAIN SCALES - GENERAL
PAINLEVEL_OUTOF10: 7
PAINLEVEL_OUTOF10: 7

## 2024-12-02 ASSESSMENT — PAIN - FUNCTIONAL ASSESSMENT: PAIN_FUNCTIONAL_ASSESSMENT: 0-10

## 2024-12-02 NOTE — H&P (VIEW-ONLY)
PAIN MANAGEMENT FOLLOW-UP OFFICE NOTE    Date of Service: 12/2/2024    SUBJECTIVE    CHIEF COMPLAINT: LB pain    HISTORY OF PRESENT ILLNESS    Meghan Hernández is a 49 y.o. female with PMH MDD, RA, ankylosing spondylitis who presents for follow-up LB pain.     On 10.17, pt underwent TIM with 100% relief BUE, but 25% relief neck pain. Completed MRI L-spine and would like to discuss results. Taking tramadol 100 mg TID PRN with mild relief. Denies SE.     Pt denies new-onset numbness, weakness, bowel/bladder incontinence.  Pt denies recent infection, allergy to Latex/iodine/contrast. Patient is currently taking the following blood thinner(s): N/A    Procedure Log:  -C7-T1 GINA 10/17/24: 100% relief BUE, 25% relief neck  -BL LMBN RFA 7/11/24: 60% ongoing  -R SIJ CSI 1/25/24: 50% relief 8 mo  -C7-T1 GINA 12/28/23: 80% relief 8 mo  -C7-T1 GINA 10/26/23: 100% relief RUE, 40% neck    REVIEW OF SYSTEMS  Review of Systems   Constitutional: Negative.    HENT: Negative.     Eyes: Negative.    Respiratory: Negative.     Cardiovascular: Negative.    Gastrointestinal: Negative.    Endocrine: Negative.    Musculoskeletal:  Positive for back pain and neck pain.   Skin: Negative.    Neurological:  Positive for weakness and numbness.   Hematological: Negative.    Psychiatric/Behavioral: Negative.         PAST MEDICAL HISTORY  Past Medical History:   Diagnosis Date    Ankylosing spondylitis of site in spine (Multi) May2023    AS (ankylosing spondylitis) (Multi)     Chronic pain disorder Long time    Headache Entire life    Low back pain In my 20s    Migraine Entire life    Neck pain In my20s    Osteoarthritis May2023    Rheumatoid arthritis      Past Surgical History:   Procedure Laterality Date    COLONOSCOPY      DILATION AND CURETTAGE OF UTERUS      ENDOSCOPY PROCEDURE NOT PERFORMED      EPIDURAL BLOCK INJECTION  Multiple     Family History   Problem Relation Name Age of Onset    Thyroid disease Mother      Colon cancer Father Mynor      Cancer Father Mynor     Thyroid disease Sister      Cerebral palsy Brother          1/2 brother    Stomach cancer Maternal Grandmother      Breast cancer Other AUNT     Heart attack Other UNCLE     Arthritis Paternal Grandmother Shaniqua        CURRENT MEDICATIONS  Current Outpatient Medications   Medication Sig Dispense Refill    Cosentyx Pen 150 mg/mL self-injector pen Inject 2 mL (300 mg) under the skin every 30 (thirty) days.      DULoxetine (Cymbalta) 60 mg DR capsule Take 1 capsule (60 mg) by mouth 2 times a day. 90 capsule 3    iron,carb/vit C/vit B12/folic (IRON 100 PLUS ORAL) Take by mouth.      methocarbamol (Robaxin) 750 mg tablet Take 1 tablet (750 mg) by mouth 4 times a day as needed for muscle spasms. 360 tablet 2    naloxone (Narcan) 4 mg/0.1 mL nasal spray Administer 1 spray (4 mg) into affected nostril(s) if needed for opioid reversal or respiratory depression.      norethindrone-e.estradioL-iron (Lo Loestrin) 1 mg-10 mcg (24)/10 mcg (2) tablet Take 1 tablet by mouth once daily. 84 tablet 3    omeprazole (PriLOSEC) 40 mg DR capsule Take by mouth.      saccharomyces boulardii (Florastor) 250 mg capsule Take 1 capsule (250 mg) by mouth 2 times a day.      traMADol (Ultram) 50 mg tablet Take 2 tablets (100 mg) by mouth every 8 hours if needed for severe pain (7 - 10). 180 tablet 2    traZODone (Desyrel) 100 mg tablet Take 1 tablet (100 mg) by mouth as needed at bedtime for sleep. 90 tablet 1    cholecalciferol (Vitamin D3) 5,000 Units tablet Take by mouth once daily.      cyanocobalamin (Vitamin B-12) 100 mcg tablet Take by mouth. As directed       No current facility-administered medications for this visit.       ALLERGIES AND DRUG REACTIONS  No Known Allergies       OBJECTIVE  Visit Vitals  /81   Pulse 87   Resp 20   Ht 1.524 m (5')   Wt 59.4 kg (131 lb)   SpO2 98%   BMI 25.58 kg/m²   OB Status Perimenopausal   Smoking Status Former   BSA 1.59 m²       Last Recorded Pain Score (if available):                 Physical Exam  Vitals and nursing note reviewed.     General: Sitting in chair, NAD  Head: NCAT  Eyes: Sclera/conjunctiva clear, EOMI, PERRL  Nose/mouth: MMM  CV: palpable radial pulse RRR  Lungs: Good/equal chest excursion  Abdomen: Soft, ND  Ext: No cyanosis/edema  MSK: c-spine: anterior tilt, BL paraspinal m TTP, neg Spurling/Lhermitte, pain with turning  R SIJ: Benny finger, TTP, GOMEZ, GAenslen    Neuro: AAOx3, CN grossly nl  Dermatome sensation to light touch  LEFT C5: WNL    RIGHT C5: WNL      LEFT C6: WNL       RIGHT C6: WNL      LEFT C7: WNL       RIGHT C7: WNL      LEFT C8: WNL       RIGHT C8: WNL      LEFT T1: WNL       RIGHT T1: WNL    Motor strength  LEFT C5 (elbow flexion): 5/5   RIGHT C5: 5/5  LEFT C6 (wrist extension): 5/5     RIGHT C6: 5/5  LEFT C7 (elbow extension): 5/5     RIGHT C7: 5/5  LEFT C8 (finger abduction): 5/5     RIGHT C8: 5/5  LEFT T1 (hand ): 5/5     RIGHT T1: 5/5    Special testing  Forde: neg BL        Psych: affect nl  Skin: no rash/lesions      REVIEW OF LABORATORY DATA  I have reviewed the following lab results:  WBC   Date Value Ref Range Status   11/04/2024 7.2 4.4 - 11.3 x10*3/uL Final     RBC   Date Value Ref Range Status   11/04/2024 4.56 4.00 - 5.20 x10*6/uL Final     Hemoglobin   Date Value Ref Range Status   11/04/2024 13.7 12.0 - 16.0 g/dL Final     Hematocrit   Date Value Ref Range Status   11/04/2024 40.5 36.0 - 46.0 % Final     MCV   Date Value Ref Range Status   11/04/2024 89 80 - 100 fL Final     MCH   Date Value Ref Range Status   11/04/2024 30.0 26.0 - 34.0 pg Final     MCHC   Date Value Ref Range Status   11/04/2024 33.8 32.0 - 36.0 g/dL Final     RDW   Date Value Ref Range Status   11/04/2024 12.8 11.5 - 14.5 % Final     Platelets   Date Value Ref Range Status   11/04/2024 318 150 - 450 x10*3/uL Final     MPV   Date Value Ref Range Status   09/07/2021 11.0 7.0 - 12.6 CU Final     Sodium   Date Value Ref Range Status   11/04/2024 135 (L) 136 -  "145 mmol/L Final     Potassium   Date Value Ref Range Status   11/04/2024 3.8 3.5 - 5.3 mmol/L Final     Bicarbonate   Date Value Ref Range Status   11/04/2024 27 21 - 32 mmol/L Final     Urea Nitrogen   Date Value Ref Range Status   11/04/2024 4 (L) 6 - 23 mg/dL Final     Calcium   Date Value Ref Range Status   11/04/2024 8.8 8.6 - 10.3 mg/dL Final     No results found for: \"PROTIME\", \"PTT\", \"INR\", \"FIBRINOGEN\"      REVIEW OF RADIOLOGY   I have reviewed the following:  Radiology Studies           MRI c-spine 8/30/23:  There is loss of the normal cervical lordosis. No acute fracture is  identified. There is grade 1 anterolisthesis C4 on C5. There are posterior  osteophytes extending from the C3-C6 levels. No  STIR abnormalities are seen  within the marrow.     The visualized cord signal is grossly unremarkable.     C2-C3: No disc herniation. No significant canal or foraminal stenosis.  C3-C4: There is a posterior disc osteophyte complex. No significant canal or  foraminal stenosis.  C4-C5: There is a posterior disc osteophyte complex. No significant canal or  foraminal stenosis.  C5-C6: There is a posterior disc osteophyte complex. There is facet  osteoarthropathy. No significant canal stenosis. Mild right neural foraminal  narrowing.  C6-C7: There is a posterior disc osteophyte complex. There is facet  osteoarthropathy. No significant canal stenosis. Mild right neural foraminal  narrowing.  C7-T1: No disc herniation. No significant canal or foraminal stenosis.     The paravertebral soft tissues are unremarkable.     IMPRESSION:  Mild multilevel cervical spondylosis most severe at C5-C6 and C6-C7  minimally progressed as compared to the prior examination from 2014.            ASSESSMENT & PLAN  Meghan Hernández is a 49 y.o. female with PMH MDD, RA, ankylosing spondylitis who presents for F/U     1) Cervical radic  -Neck pain since 2021 radiating to right upper extremity with subjective fourth and fifth digit numbness, " tingling, weakness. Possible contribution from rheumatoid arthritis, ankylosing spondylitis  -Refractive to Tylenol, NSAIDs, muscle relaxants, Cymbalta, topical lidocaine, >6 w PT  -MRI C-spine 8/30/2023: Multilevel spondylosis featuring grade 1 C4-5 listhesis, severe facet arthropathy at C5-6 and C6-7 with right neuroforaminal stenosis  -C7-T1 GINA 10/17/24: 100% relief BUE, 25% relief neck  -Consider repeat TIM after SIJ CSI    2) LBP  -Originally LBP since ~2008 with right leg pain, numbness, weakness- possible contribution from ankylosing spondylitis. BLE obj weakness now resolved.  -Refractive to Tylenol, NSAIDs, muscle relaxants, Cymbalta, topical lidocaine, chiropractics, >6 w PT.   -Reviewed/discussed MRI L-spine 10/15/24: multilevel spondylosis featuring mod R>L NFS at L3-4  -BL LMBN RFA 7/11/24: 60% ongoing relief    3) Sacroiliitis  -R-sided LBP reproduced on numerous R SIJ-provocative maneuvers on exam  -R SIJ CSI 1/25/24: 50% relief 8 mo  -Schedule repeat R SIJ CSI    4) Fibromyalgia  -Diffuse whole body pain for yrs on hx RA and ankylosing spondylitis on duloxetine  -Refractive to Tylenol, NSAIDs, muscle relaxants, Cymbalta, topical lidocaine, chiropractics, >6 w PT, gabapentin, pregabalin  -Cont methocarbamol 750 mg QID PRN    5) opioid mgmt  2/2 chronic neck and LBP refractive to conservative, interventional tx  -Tramadol providing insufficient relief  - reviewed/appropriate. Reviewed UDS 5/13/24: OK. No sign of aberrant behavior. UDS today  -Norco 10 mg QID PRN (40 MME) x30 d        Discussed procedure risks/benefits in detail with patient. Pt meets medical necessity for procedure due to failure of conservative measures. Reviewed procedural risks including bleeding, infection, nerve damage, paralysis. Also reviewed mitigating factors such as screening for infection/blood thinner use, sterile precautions, and image-guidance when applicable. All questions answered. Pt/guardian expressed  understanding and choose to proceed                Sofiya Mandujano MD  Anesthesiologist & Interventional Pain Physician   [facility] [place]  O: [phone]  F: [phone]  3:26 PM  12/02/24

## 2024-12-02 NOTE — PROGRESS NOTES
PAIN MANAGEMENT FOLLOW-UP OFFICE NOTE    Date of Service: 12/2/2024    SUBJECTIVE    CHIEF COMPLAINT: LB pain    HISTORY OF PRESENT ILLNESS    Meghan Hernández is a 49 y.o. female with PMH MDD, RA, ankylosing spondylitis who presents for follow-up LB pain.     On 10.17, pt underwent TIM with 100% relief BUE, but 25% relief neck pain. Completed MRI L-spine and would like to discuss results. Taking tramadol 100 mg TID PRN with mild relief. Denies SE.     Pt denies new-onset numbness, weakness, bowel/bladder incontinence.  Pt denies recent infection, allergy to Latex/iodine/contrast. Patient is currently taking the following blood thinner(s): N/A    Procedure Log:  -C7-T1 GINA 10/17/24: 100% relief BUE, 25% relief neck  -BL LMBN RFA 7/11/24: 60% ongoing  -R SIJ CSI 1/25/24: 50% relief 8 mo  -C7-T1 GINA 12/28/23: 80% relief 8 mo  -C7-T1 GINA 10/26/23: 100% relief RUE, 40% neck    REVIEW OF SYSTEMS  Review of Systems   Constitutional: Negative.    HENT: Negative.     Eyes: Negative.    Respiratory: Negative.     Cardiovascular: Negative.    Gastrointestinal: Negative.    Endocrine: Negative.    Musculoskeletal:  Positive for back pain and neck pain.   Skin: Negative.    Neurological:  Positive for weakness and numbness.   Hematological: Negative.    Psychiatric/Behavioral: Negative.         PAST MEDICAL HISTORY  Past Medical History:   Diagnosis Date    Ankylosing spondylitis of site in spine (Multi) May2023    AS (ankylosing spondylitis) (Multi)     Chronic pain disorder Long time    Headache Entire life    Low back pain In my 20s    Migraine Entire life    Neck pain In my20s    Osteoarthritis May2023    Rheumatoid arthritis      Past Surgical History:   Procedure Laterality Date    COLONOSCOPY      DILATION AND CURETTAGE OF UTERUS      ENDOSCOPY PROCEDURE NOT PERFORMED      EPIDURAL BLOCK INJECTION  Multiple     Family History   Problem Relation Name Age of Onset    Thyroid disease Mother      Colon cancer Father Mynor      Cancer Father Mynor     Thyroid disease Sister      Cerebral palsy Brother          1/2 brother    Stomach cancer Maternal Grandmother      Breast cancer Other AUNT     Heart attack Other UNCLE     Arthritis Paternal Grandmother Shaniqua        CURRENT MEDICATIONS  Current Outpatient Medications   Medication Sig Dispense Refill    Cosentyx Pen 150 mg/mL self-injector pen Inject 2 mL (300 mg) under the skin every 30 (thirty) days.      DULoxetine (Cymbalta) 60 mg DR capsule Take 1 capsule (60 mg) by mouth 2 times a day. 90 capsule 3    iron,carb/vit C/vit B12/folic (IRON 100 PLUS ORAL) Take by mouth.      methocarbamol (Robaxin) 750 mg tablet Take 1 tablet (750 mg) by mouth 4 times a day as needed for muscle spasms. 360 tablet 2    naloxone (Narcan) 4 mg/0.1 mL nasal spray Administer 1 spray (4 mg) into affected nostril(s) if needed for opioid reversal or respiratory depression.      norethindrone-e.estradioL-iron (Lo Loestrin) 1 mg-10 mcg (24)/10 mcg (2) tablet Take 1 tablet by mouth once daily. 84 tablet 3    omeprazole (PriLOSEC) 40 mg DR capsule Take by mouth.      saccharomyces boulardii (Florastor) 250 mg capsule Take 1 capsule (250 mg) by mouth 2 times a day.      traMADol (Ultram) 50 mg tablet Take 2 tablets (100 mg) by mouth every 8 hours if needed for severe pain (7 - 10). 180 tablet 2    traZODone (Desyrel) 100 mg tablet Take 1 tablet (100 mg) by mouth as needed at bedtime for sleep. 90 tablet 1    cholecalciferol (Vitamin D3) 5,000 Units tablet Take by mouth once daily.      cyanocobalamin (Vitamin B-12) 100 mcg tablet Take by mouth. As directed       No current facility-administered medications for this visit.       ALLERGIES AND DRUG REACTIONS  No Known Allergies       OBJECTIVE  Visit Vitals  /81   Pulse 87   Resp 20   Ht 1.524 m (5')   Wt 59.4 kg (131 lb)   SpO2 98%   BMI 25.58 kg/m²   OB Status Perimenopausal   Smoking Status Former   BSA 1.59 m²       Last Recorded Pain Score (if available):                 Physical Exam  Vitals and nursing note reviewed.     General: Sitting in chair, NAD  Head: NCAT  Eyes: Sclera/conjunctiva clear, EOMI, PERRL  Nose/mouth: MMM  CV: palpable radial pulse RRR  Lungs: Good/equal chest excursion  Abdomen: Soft, ND  Ext: No cyanosis/edema  MSK: c-spine: anterior tilt, BL paraspinal m TTP, neg Spurling/Lhermitte, pain with turning  R SIJ: Benny finger, TTP, GOMEZ, GAenslen    Neuro: AAOx3, CN grossly nl  Dermatome sensation to light touch  LEFT C5: WNL    RIGHT C5: WNL      LEFT C6: WNL       RIGHT C6: WNL      LEFT C7: WNL       RIGHT C7: WNL      LEFT C8: WNL       RIGHT C8: WNL      LEFT T1: WNL       RIGHT T1: WNL    Motor strength  LEFT C5 (elbow flexion): 5/5   RIGHT C5: 5/5  LEFT C6 (wrist extension): 5/5     RIGHT C6: 5/5  LEFT C7 (elbow extension): 5/5     RIGHT C7: 5/5  LEFT C8 (finger abduction): 5/5     RIGHT C8: 5/5  LEFT T1 (hand ): 5/5     RIGHT T1: 5/5    Special testing  Forde: neg BL        Psych: affect nl  Skin: no rash/lesions      REVIEW OF LABORATORY DATA  I have reviewed the following lab results:  WBC   Date Value Ref Range Status   11/04/2024 7.2 4.4 - 11.3 x10*3/uL Final     RBC   Date Value Ref Range Status   11/04/2024 4.56 4.00 - 5.20 x10*6/uL Final     Hemoglobin   Date Value Ref Range Status   11/04/2024 13.7 12.0 - 16.0 g/dL Final     Hematocrit   Date Value Ref Range Status   11/04/2024 40.5 36.0 - 46.0 % Final     MCV   Date Value Ref Range Status   11/04/2024 89 80 - 100 fL Final     MCH   Date Value Ref Range Status   11/04/2024 30.0 26.0 - 34.0 pg Final     MCHC   Date Value Ref Range Status   11/04/2024 33.8 32.0 - 36.0 g/dL Final     RDW   Date Value Ref Range Status   11/04/2024 12.8 11.5 - 14.5 % Final     Platelets   Date Value Ref Range Status   11/04/2024 318 150 - 450 x10*3/uL Final     MPV   Date Value Ref Range Status   09/07/2021 11.0 7.0 - 12.6 CU Final     Sodium   Date Value Ref Range Status   11/04/2024 135 (L) 136 -  "145 mmol/L Final     Potassium   Date Value Ref Range Status   11/04/2024 3.8 3.5 - 5.3 mmol/L Final     Bicarbonate   Date Value Ref Range Status   11/04/2024 27 21 - 32 mmol/L Final     Urea Nitrogen   Date Value Ref Range Status   11/04/2024 4 (L) 6 - 23 mg/dL Final     Calcium   Date Value Ref Range Status   11/04/2024 8.8 8.6 - 10.3 mg/dL Final     No results found for: \"PROTIME\", \"PTT\", \"INR\", \"FIBRINOGEN\"      REVIEW OF RADIOLOGY   I have reviewed the following:  Radiology Studies           MRI c-spine 8/30/23:  There is loss of the normal cervical lordosis. No acute fracture is  identified. There is grade 1 anterolisthesis C4 on C5. There are posterior  osteophytes extending from the C3-C6 levels. No  STIR abnormalities are seen  within the marrow.     The visualized cord signal is grossly unremarkable.     C2-C3: No disc herniation. No significant canal or foraminal stenosis.  C3-C4: There is a posterior disc osteophyte complex. No significant canal or  foraminal stenosis.  C4-C5: There is a posterior disc osteophyte complex. No significant canal or  foraminal stenosis.  C5-C6: There is a posterior disc osteophyte complex. There is facet  osteoarthropathy. No significant canal stenosis. Mild right neural foraminal  narrowing.  C6-C7: There is a posterior disc osteophyte complex. There is facet  osteoarthropathy. No significant canal stenosis. Mild right neural foraminal  narrowing.  C7-T1: No disc herniation. No significant canal or foraminal stenosis.     The paravertebral soft tissues are unremarkable.     IMPRESSION:  Mild multilevel cervical spondylosis most severe at C5-C6 and C6-C7  minimally progressed as compared to the prior examination from 2014.            ASSESSMENT & PLAN  Meghan Hernández is a 49 y.o. female with PMH MDD, RA, ankylosing spondylitis who presents for F/U     1) Cervical radic  -Neck pain since 2021 radiating to right upper extremity with subjective fourth and fifth digit numbness, " tingling, weakness. Possible contribution from rheumatoid arthritis, ankylosing spondylitis  -Refractive to Tylenol, NSAIDs, muscle relaxants, Cymbalta, topical lidocaine, >6 w PT  -MRI C-spine 8/30/2023: Multilevel spondylosis featuring grade 1 C4-5 listhesis, severe facet arthropathy at C5-6 and C6-7 with right neuroforaminal stenosis  -C7-T1 GINA 10/17/24: 100% relief BUE, 25% relief neck  -Consider repeat TIM after SIJ CSI    2) LBP  -Originally LBP since ~2008 with right leg pain, numbness, weakness- possible contribution from ankylosing spondylitis. BLE obj weakness now resolved.  -Refractive to Tylenol, NSAIDs, muscle relaxants, Cymbalta, topical lidocaine, chiropractics, >6 w PT.   -Reviewed/discussed MRI L-spine 10/15/24: multilevel spondylosis featuring mod R>L NFS at L3-4  -BL LMBN RFA 7/11/24: 60% ongoing relief    3) Sacroiliitis  -R-sided LBP reproduced on numerous R SIJ-provocative maneuvers on exam  -R SIJ CSI 1/25/24: 50% relief 8 mo  -Schedule repeat R SIJ CSI    4) Fibromyalgia  -Diffuse whole body pain for yrs on hx RA and ankylosing spondylitis on duloxetine  -Refractive to Tylenol, NSAIDs, muscle relaxants, Cymbalta, topical lidocaine, chiropractics, >6 w PT, gabapentin, pregabalin  -Cont methocarbamol 750 mg QID PRN    5) opioid mgmt  2/2 chronic neck and LBP refractive to conservative, interventional tx  -Tramadol providing insufficient relief  - reviewed/appropriate. Reviewed UDS 5/13/24: OK. No sign of aberrant behavior. UDS today  -Norco 10 mg QID PRN (40 MME) x30 d        Discussed procedure risks/benefits in detail with patient. Pt meets medical necessity for procedure due to failure of conservative measures. Reviewed procedural risks including bleeding, infection, nerve damage, paralysis. Also reviewed mitigating factors such as screening for infection/blood thinner use, sterile precautions, and image-guidance when applicable. All questions answered. Pt/guardian expressed  understanding and choose to proceed                Sofiya Mandujano MD  Anesthesiologist & Interventional Pain Physician   [facility] [place]  O: [phone]  F: [phone]  3:26 PM  12/02/24

## 2024-12-02 NOTE — PROGRESS NOTES
MEDICATION NAME: Tramadol  STRENGTH: 50 mg.  LAST FILL DATE: 2024  DATE LAST TAKEN: 2024  QUANTITY FILLED: 180  QUANTITY REMAININ  COUNT COMPLETED BY: VINAY KYLE RN and VINAY PONCE RN       UDS LAST COMPLETED:   CONTROLLED SUBSTANCES AGREEMENT LAST SIGNED:   ORT LAST COMPLETED:  Modified Oswestry disability form filled out annually.

## 2024-12-03 ENCOUNTER — TELEPHONE (OUTPATIENT)
Dept: PAIN MEDICINE | Facility: CLINIC | Age: 49
End: 2024-12-03
Payer: COMMERCIAL

## 2024-12-03 LAB
AMPHETAMINES UR QL SCN: NORMAL
BARBITURATES UR QL SCN: NORMAL
BZE UR QL SCN: NORMAL
CANNABINOIDS UR QL SCN: NORMAL
CREAT UR-MCNC: 55.9 MG/DL (ref 20–320)
PCP UR QL SCN: NORMAL

## 2024-12-03 NOTE — TELEPHONE ENCOUNTER
Meghan called to inform us that her Tramadol script was changed to percocet.  According to your notes she was receiving good results from the tramadol.  The pharmacist from [facility] was also enquiring about the change in medication before they will filll the percocet script.  Could you please let the pharmacy know your preference  Tramadol or Percocet.  [phone]   Pt with h/o a fib on eliquis and metoprolol at home. Currently rate controlled    -continue eliquis and metoprolol

## 2024-12-04 ENCOUNTER — HOSPITAL ENCOUNTER (OUTPATIENT)
Dept: RADIOLOGY | Facility: HOSPITAL | Age: 49
Discharge: HOME | End: 2024-12-04
Payer: COMMERCIAL

## 2024-12-04 ENCOUNTER — APPOINTMENT (OUTPATIENT)
Dept: PAIN MEDICINE | Facility: CLINIC | Age: 49
End: 2024-12-04
Payer: COMMERCIAL

## 2024-12-04 DIAGNOSIS — Z12.31 SCREENING MAMMOGRAM FOR BREAST CANCER: ICD-10-CM

## 2024-12-04 PROCEDURE — 77063 BREAST TOMOSYNTHESIS BI: CPT

## 2024-12-04 ASSESSMENT — ENCOUNTER SYMPTOMS
CHEST TIGHTNESS: 0
WEAKNESS: 0
ABDOMINAL PAIN: 0
ACTIVITY CHANGE: 0
UNEXPECTED WEIGHT CHANGE: 0
FATIGUE: 0
DIZZINESS: 0
HEADACHES: 0
ABDOMINAL DISTENTION: 0
DYSURIA: 0
COLOR CHANGE: 0
DIFFICULTY URINATING: 0
ADENOPATHY: 0
SHORTNESS OF BREATH: 0
JOINT SWELLING: 0

## 2024-12-05 ENCOUNTER — APPOINTMENT (OUTPATIENT)
Dept: OBSTETRICS AND GYNECOLOGY | Facility: CLINIC | Age: 49
End: 2024-12-05
Payer: COMMERCIAL

## 2024-12-05 NOTE — PROGRESS NOTES
Annual  Subjective   Meghan Hernández is a 49 y.o. female who is here for a routine exam.   Complaints:   ***         Review of Systems   Constitutional:  Negative for activity change, fatigue and unexpected weight change.   Respiratory:  Negative for chest tightness and shortness of breath.    Cardiovascular:  Negative for chest pain and leg swelling.   Gastrointestinal:  Negative for abdominal distention and abdominal pain.   Genitourinary:  Negative for difficulty urinating, dysuria, genital sores, pelvic pain, vaginal bleeding, vaginal discharge and vaginal pain.   Musculoskeletal:  Negative for gait problem and joint swelling.   Skin:  Negative for color change and rash.   Neurological:  Negative for dizziness, weakness and headaches.   Hematological:  Negative for adenopathy.       Objective   There were no vitals taken for this visit.       General:   Alert and oriented, in no acute distress   Neck: Supple. No visible thyromegaly.    Breast/Axilla: Normal to palpation bilaterally without masses, skin changes, or nipple discharge.    Abdomen: Soft, non-tender, without masses or organomegaly   Vulva: Normal architecture without erythema, masses, or lesions.    Vagina: Normal mucosa without lesions, masses, or atrophy. No abnormal vaginal discharge.    Cervix: Normal without masses, lesions, or signs of cervicitis   Uterus: Normal, mobile, non-enlarged uterus   Adnexa: Normal without masses or lesions   Pelvic Floor normal   Psych Normal affect. Normal mood.      Assessment/Plan   {Assess/Plan SmartLinks (Optional):69961}    Routine annual    Pap due***  Mammogram    Soheila Johnson MD

## 2024-12-06 LAB
1OH-MIDAZOLAM UR CFM-MCNC: <25 NG/ML
6MAM UR CFM-MCNC: <25 NG/ML
7AMINOCLONAZEPAM UR CFM-MCNC: <25 NG/ML
A-OH ALPRAZ UR CFM-MCNC: <25 NG/ML
ALPRAZ UR CFM-MCNC: <25 NG/ML
CHLORDIAZEP UR CFM-MCNC: <25 NG/ML
CLONAZEPAM UR CFM-MCNC: <25 NG/ML
CODEINE UR CFM-MCNC: <50 NG/ML
DIAZEPAM UR CFM-MCNC: <25 NG/ML
EDDP UR CFM-MCNC: <25 NG/ML
FENTANYL UR CFM-MCNC: <2.5 NG/ML
HYDROCODONE CTO UR CFM-MCNC: <25 NG/ML
HYDROMORPHONE UR CFM-MCNC: <25 NG/ML
LORAZEPAM UR CFM-MCNC: <25 NG/ML
METHADONE UR CFM-MCNC: <25 NG/ML
MIDAZOLAM UR CFM-MCNC: <25 NG/ML
MORPHINE UR CFM-MCNC: <50 NG/ML
NORDIAZEPAM UR CFM-MCNC: <25 NG/ML
NORFENTANYL UR CFM-MCNC: <2.5 NG/ML
NORHYDROCODONE UR CFM-MCNC: <25 NG/ML
NOROXYCODONE UR CFM-MCNC: <25 NG/ML
NORTRAMADOL UR-MCNC: >1000 NG/ML
OXAZEPAM UR CFM-MCNC: <25 NG/ML
OXYCODONE UR CFM-MCNC: <25 NG/ML
OXYMORPHONE UR CFM-MCNC: <25 NG/ML
TEMAZEPAM UR CFM-MCNC: <25 NG/ML
TRAMADOL UR CFM-MCNC: >1000 NG/ML
ZOLPIDEM UR CFM-MCNC: <25 NG/ML
ZOLPIDEM UR-MCNC: <25 NG/ML

## 2024-12-09 DIAGNOSIS — R92.8 ABNORMALITY OF LEFT BREAST ON SCREENING MAMMOGRAM: Primary | ICD-10-CM

## 2024-12-11 ENCOUNTER — TELEPHONE (OUTPATIENT)
Dept: PAIN MEDICINE | Facility: CLINIC | Age: 49
End: 2024-12-11
Payer: COMMERCIAL

## 2024-12-12 NOTE — TELEPHONE ENCOUNTER
PATIENT MADE AWARE OF CHANGE AND INFORMED SHE NEEDS TO BE NPO  PT DENIED ANY QUESTIONS OR CONCERNS.   APPT NOTES UPDATED

## 2024-12-16 ENCOUNTER — APPOINTMENT (OUTPATIENT)
Dept: PRIMARY CARE | Facility: CLINIC | Age: 49
End: 2024-12-16
Payer: COMMERCIAL

## 2024-12-19 ENCOUNTER — HOSPITAL ENCOUNTER (OUTPATIENT)
Dept: GASTROENTEROLOGY | Facility: HOSPITAL | Age: 49
Discharge: HOME | End: 2024-12-19
Payer: COMMERCIAL

## 2024-12-19 VITALS
TEMPERATURE: 97.2 F | BODY MASS INDEX: 25.13 KG/M2 | HEIGHT: 60 IN | SYSTOLIC BLOOD PRESSURE: 104 MMHG | DIASTOLIC BLOOD PRESSURE: 61 MMHG | OXYGEN SATURATION: 98 % | RESPIRATION RATE: 15 BRPM | WEIGHT: 128 LBS | HEART RATE: 84 BPM

## 2024-12-19 DIAGNOSIS — M46.1 SACROILIITIS, NOT ELSEWHERE CLASSIFIED (CMS-HCC): ICD-10-CM

## 2024-12-19 PROCEDURE — 2500000004 HC RX 250 GENERAL PHARMACY W/ HCPCS (ALT 636 FOR OP/ED): Performed by: ANESTHESIOLOGY

## 2024-12-19 PROCEDURE — 27096 INJECT SACROILIAC JOINT: CPT | Performed by: ANESTHESIOLOGY

## 2024-12-19 PROCEDURE — 99152 MOD SED SAME PHYS/QHP 5/>YRS: CPT | Performed by: ANESTHESIOLOGY

## 2024-12-19 RX ORDER — BUPIVACAINE HYDROCHLORIDE 5 MG/ML
INJECTION, SOLUTION PERINEURAL AS NEEDED
Status: COMPLETED | OUTPATIENT
Start: 2024-12-19 | End: 2024-12-19

## 2024-12-19 RX ORDER — LIDOCAINE HYDROCHLORIDE 10 MG/ML
INJECTION, SOLUTION EPIDURAL; INFILTRATION; INTRACAUDAL; PERINEURAL AS NEEDED
Status: COMPLETED | OUTPATIENT
Start: 2024-12-19 | End: 2024-12-19

## 2024-12-19 RX ORDER — METHYLPREDNISOLONE ACETATE 40 MG/ML
INJECTION, SUSPENSION INTRA-ARTICULAR; INTRALESIONAL; INTRAMUSCULAR; SOFT TISSUE AS NEEDED
Status: COMPLETED | OUTPATIENT
Start: 2024-12-19 | End: 2024-12-19

## 2024-12-19 RX ORDER — MIDAZOLAM HYDROCHLORIDE 1 MG/ML
INJECTION, SOLUTION INTRAMUSCULAR; INTRAVENOUS AS NEEDED
Status: COMPLETED | OUTPATIENT
Start: 2024-12-19 | End: 2024-12-19

## 2024-12-19 RX ORDER — FENTANYL CITRATE 50 UG/ML
INJECTION, SOLUTION INTRAMUSCULAR; INTRAVENOUS AS NEEDED
Status: COMPLETED | OUTPATIENT
Start: 2024-12-19 | End: 2024-12-19

## 2024-12-19 ASSESSMENT — COLUMBIA-SUICIDE SEVERITY RATING SCALE - C-SSRS
6. HAVE YOU EVER DONE ANYTHING, STARTED TO DO ANYTHING, OR PREPARED TO DO ANYTHING TO END YOUR LIFE?: NO
2. HAVE YOU ACTUALLY HAD ANY THOUGHTS OF KILLING YOURSELF?: NO
1. IN THE PAST MONTH, HAVE YOU WISHED YOU WERE DEAD OR WISHED YOU COULD GO TO SLEEP AND NOT WAKE UP?: NO

## 2024-12-19 ASSESSMENT — PAIN - FUNCTIONAL ASSESSMENT: PAIN_FUNCTIONAL_ASSESSMENT: 0-10

## 2024-12-19 ASSESSMENT — PAIN SCALES - GENERAL: PAINLEVEL_OUTOF10: 6

## 2024-12-19 ASSESSMENT — ENCOUNTER SYMPTOMS
DEPRESSION: 0
OCCASIONAL FEELINGS OF UNSTEADINESS: 1
LOSS OF SENSATION IN FEET: 0

## 2024-12-19 ASSESSMENT — PAIN DESCRIPTION - DESCRIPTORS: DESCRIPTORS: SHARP

## 2024-12-19 NOTE — INTERVAL H&P NOTE
H&P reviewed. The patient was examined and there are no changes to the H&P. Meghan Hernández is a 49 y.o. female with PMH MDD, RA, ankylosing spondylitis who presents for R SIJ CSI to target pain generator as seen on imaging and minimize risk/likelihood of chronic opioid use and/or surgery. Patient's pain stable and persistent from last visit.  Appropriately NPO. ASA 2. No personal/family hx issues with anesthesia. Denies allergies to Latex, steroids, local anesthetics, or iodine/contrast. Denies being on blood thinners. Not diabetic.  Denies fever, chills, NS, CP, SOB, cough, N/V.    Discussed procedure risks/benefits in detail with patient. Pt meets medical necessity for procedure due to failure of conservative measures. Reviewed procedural risks including bleeding, infection, nerve damage, paralysis. Also reviewed mitigating factors such as screening for infection/blood thinner use, sterile precautions, and image-guidance when applicable. All questions answered. Pt/guardian expressed understanding and choose to proceed      Sofiya Mandujano MD  Anesthesiologist & Interventional Pain Physician   [facility] [place]  O: [phone]  F: [phone]  8:47 AM  12/19/24

## 2024-12-19 NOTE — DISCHARGE INSTRUCTIONS
DISCHARGE INSTRUCTIONS FOR INJECTIONS     You underwent a right sacroiliac joint injection today    Aftermost injections, it is recommended that you relax and limit your activity for the remainder of the day unless you have been told otherwise by your pain physician.  You should not drive a car, operate machinery, or make important legal decisions unless otherwise directed by your pain physician.  You may resume your normal activity, including exercise, tomorrow.      Keep a written pain diary of how much pain relief you experienced following the injection procedure and the length of time of pain relief you experienced pain relief. Following diagnostic injections like medial branch nerve blocks, sacroiliac joint blocks, stellate ganglion injections and other blocks, it is very important you record the specific amount of pain relief you experienced immediately after the injectionand how long it lasted. Your doctor will ask you for this information at your follow up visit.     For all injections, please keep the injection site dry and inspect the site for a couple of days. You may remove the Band-Aid the day of the injection at any time.     Some discomfort, bruising or slight swelling may occur at the injection site. This is not abnormal if it occurs.  If needed you may:    -Take over the counter medication such as Tylenol or Motrin.   -Apply an ice pack for 30 minutes, 2 to 3 times a day for the first 24 hours.     You may shower today; no soaking baths, hot tubs, whirlpools or swimming pools for two days.      If you are given steroids in your injection, it may take 3-5 days for the steroid medication to take effect. You may notice a worsening of your symptoms for 1-2 days after the injection. This is not abnormal.  You may use acetaminophen, ibuprofen, or prescription medication that your doctor may have prescribed for you if you need to do so.     A few common side effects of steroids include facial flushing,  sweating, restlessness, irritability,difficulty sleeping, increase in blood sugar, and increased blood pressure. If you have diabetes, please monitor your blood sugar at least once a day for at least 5 days. If you have poorly controlled high blood pressure, monitoryour blood pressure for at least 2 days and contact your primary care physician if these numbers are unusually high for you.      If you take aspirin or non-steroidal anti-inflammatory drugs (examples are Motrin, Advil, ibuprofen, Naprosyn, Voltaren, Relafen, etc.) you may restart these this evening, but stop taking it 3 days before your next appointment, unless instructed otherwiseby your physician.      You do not need to discontinue non-aspirin-containing pain medications prior to an injection (examples: Celebrex, tramadol, hydrocodone and acetaminophen).      If you take a blood thinning medication (Coumadin, Lovenox, Fragmin,Ticlid, Plavix, Pradaxa, etc.), please discuss this with your primary care physician/cardiologist and your pain physician. These medications MUST be discontinued before you can have an injection safely, without the risk of uncontrolled bleeding. If these medications are not discontinued for an appropriate period of time, you will not be able to receivean injection.      If you are taking Coumadin, please have your INR checked the morning of your procedure and bringthe result to your appointment unless otherwise instructed. If your INR is over 1.2, your injection may need to be rescheduled to avoid uncontrolled bleeding from the needle placement.     Call [facility] at [phone] between 8am-4pm Monday - Friday if you are experiencing the following:    If you received an epidural or spinal injection:    -Headache that doesnot go away with medicine, is worse when sitting or standing up, and is greatly relieved upon lying down.   -Severe pain worse than or different than your baseline pain.   -Chills or fever  (101º F or greater).   -Drainage or signs of infection at the injection site     Go directly to the Emergency Department if you are experiencing the following and received an epidural or spinal injection:   -Abrupt weakness or progressive weakness in your legs that starts after you leave the clinic.   -Abrupt severe or worsening numbness in your legs.   -Inability to urinate after the injection or loss of bowel or bladder control without the urge to defecate or urinate.     If you have a clinical question that cannot wait until your next appointment, please call [phone] between 8am-4pm Monday - Friday or send a [employer] message. We do our best to return all non-emergency messages within 24 hours, Monday - Friday. A nurse or physician will return your message.      If you need to cancel an appointment, please call the scheduling staff at [phone] during normal business hours or leave a message at least 24 hours in advance.     If you are going to be sedated for your next procedure, you MUST have responsible adult who can legally drive accompany you home. You cannot eat or drink for eight hours prior to the planned procedure if you are going to receive sedation. You may take your non-blood thinning medications with a small sip of water.

## 2024-12-26 ENCOUNTER — HOSPITAL ENCOUNTER (OUTPATIENT)
Dept: RADIOLOGY | Facility: HOSPITAL | Age: 49
Discharge: HOME | End: 2024-12-26
Payer: COMMERCIAL

## 2024-12-26 DIAGNOSIS — R92.8 ABNORMALITY OF LEFT BREAST ON SCREENING MAMMOGRAM: ICD-10-CM

## 2024-12-26 PROCEDURE — 76982 USE 1ST TARGET LESION: CPT | Mod: LT

## 2024-12-26 PROCEDURE — 76642 ULTRASOUND BREAST LIMITED: CPT | Mod: LT

## 2024-12-26 PROCEDURE — 76642 ULTRASOUND BREAST LIMITED: CPT | Mod: LEFT SIDE | Performed by: RADIOLOGY

## 2024-12-27 ENCOUNTER — TELEPHONE (OUTPATIENT)
Dept: PAIN MEDICINE | Facility: CLINIC | Age: 49
End: 2024-12-27
Payer: COMMERCIAL

## 2024-12-27 DIAGNOSIS — M54.50 CHRONIC RIGHT-SIDED LOW BACK PAIN WITHOUT SCIATICA: ICD-10-CM

## 2024-12-27 DIAGNOSIS — G89.29 CHRONIC RIGHT-SIDED LOW BACK PAIN WITHOUT SCIATICA: ICD-10-CM

## 2024-12-27 RX ORDER — HYDROCODONE BITARTRATE AND ACETAMINOPHEN 10; 325 MG/1; MG/1
1 TABLET ORAL 4 TIMES DAILY PRN
Qty: 20 TABLET | Refills: 0 | Status: SHIPPED | OUTPATIENT
Start: 2025-01-02 | End: 2025-01-07

## 2024-12-27 NOTE — TELEPHONE ENCOUNTER
Request a short script for Hydrocodone until she see Lio on 1-6-25. Last filled date 12/3/24 per [facility].

## 2024-12-29 ASSESSMENT — ENCOUNTER SYMPTOMS
COLOR CHANGE: 0
ABDOMINAL DISTENTION: 0
DYSURIA: 0
SHORTNESS OF BREATH: 0
JOINT SWELLING: 0
DIFFICULTY URINATING: 0
CHEST TIGHTNESS: 0
ACTIVITY CHANGE: 0
DIZZINESS: 0
HEADACHES: 0
ADENOPATHY: 0
WEAKNESS: 0

## 2024-12-29 NOTE — PROGRESS NOTES
Annual  Subjective   Meghan Hernández is a 49 y.o. last seen 2024, who is here for a routine exam, follow-up on perimenopausal issues, new concern for lower abdominal/pelvic pain.   Complaints: 1) was using Lo-loestrin to control perimenopausal dub; prescription  end of  and patient has gone without for past 6 weeks ; reports LMP 2024 as light flow no cramping/end of last OCP pack.   FSH 67.5, 2022.  Chronic lumbar pain---Nerve ablation completed; only modest improvement; also requires meds; next appt with pain specialist 2025.  Fibromyalgia---Rheum stopped Cosentyx due to diarrhea, sees tomorrow to discuss alternate treatments.  GI specialist ordered stool sample, but when patient presented to lab they did not currently have supplies for collection.  She was given alternate lab sites but has not yet followed up.  She does have upcoming appointment with GI specialist.  Last pap 22 neg/hpv neg  Last mamm 2024 pos new density LT; dx us= 5-6 mm cyst; resume routine screens per rad.  Colonoscopy 10/20/2022 neg; rpt due / hx  Review of Systems   Constitutional:  Positive for fatigue and unexpected weight change. Negative for activity change.   Respiratory:  Negative for chest tightness and shortness of breath.    Cardiovascular:  Negative for chest pain and leg swelling.   Gastrointestinal:  Positive for abdominal pain and diarrhea. Negative for abdominal distention.   Genitourinary:  Negative for difficulty urinating, dysuria, genital sores, pelvic pain, vaginal bleeding, vaginal discharge and vaginal pain.   Musculoskeletal:  Positive for arthralgias, back pain, myalgias and neck pain. Negative for gait problem and joint swelling.   Skin:  Negative for color change and rash.   Neurological:  Negative for dizziness, weakness and headaches.   Hematological:  Negative for adenopathy.   Psychiatric/Behavioral:  Positive for dysphoric mood and sleep disturbance.    Objective Visit  Vitals  /80   Ht 1.524 m (5')   Wt 59.6 kg (131 lb 6.4 oz)   LMP 12/11/2024 (Approximate)   BMI 25.66 kg/m²   OB Status Perimenopausal   Smoking Status Former   BSA 1.59 m²       General:   Alert and oriented, in no acute distress   Neck: Supple. No visible thyromegaly.    Breast/Axilla: Normal to palpation bilaterally without masses, skin changes, or nipple discharge.    Abdomen: Soft, non-tender, without masses or organomegaly   Vulva: Normal architecture without erythema, masses, or lesions.    Vagina:  mucosa without lesions, masses, or atrophy. No abnormal vaginal discharge.    Cervix: Normal without masses, lesions, or signs of cervicitis   Uterus: Normal, mobile, non-enlarged uterus   Adnexa: No palpable masses or tenderness   Pelvic Floor normal   Psych Normal affect. Normal mood.    Assessment/Plan   Encounter Diagnoses   Name Primary?    Well woman exam with routine gynecological exam; no suspicious findings on current breast or pelvic exam Yes    Encounter for screening mammogram for malignant neoplasm of breast; completed screening and diagnostic ultrasound positive for small cyst; next mammogram due 12/25.     Perimenopause; 1 to 2 years menopausal; history of irregular bleeding well-controlled on low-dose OCPs; patient choosing trial off OCPs for now; no recent vasomotor symptoms in past 6 weeks off OCPs.     Irregular menses; previously controlled by low-dose OCPs; good experience recurrence of some irregular bleeding now that she has stopped OCPs/early menopause.     Surveillance of previously prescribed contraceptive pill; no known contraindications or significant side effects; patient choosing trial off OCPs to assess symptoms.     Postmenopausal atrophic vaginitis; reviewed use of lubricants and moisturizers          Screen for colon cancer; currently scoped every 5 years due to family history; recent GI symptoms may warrant sooner evaluation if recur.      History of tobacco use; quit 2019;  nicotine use reported as approximately 30 years duration prior to this=remains at risk for lung/heart/bone/tissue weakness.       Pelvic pain in female; current pelvic exam reveals no obvious findings, but will eval with ultrasound.     Soheila Johnson MD

## 2025-01-06 ENCOUNTER — OFFICE VISIT (OUTPATIENT)
Dept: PAIN MEDICINE | Facility: CLINIC | Age: 50
End: 2025-01-06
Payer: COMMERCIAL

## 2025-01-06 VITALS
WEIGHT: 128 LBS | SYSTOLIC BLOOD PRESSURE: 136 MMHG | OXYGEN SATURATION: 96 % | RESPIRATION RATE: 18 BRPM | HEART RATE: 101 BPM | BODY MASS INDEX: 25.13 KG/M2 | HEIGHT: 60 IN | DIASTOLIC BLOOD PRESSURE: 82 MMHG

## 2025-01-06 DIAGNOSIS — M45.0 ANKYLOSING SPONDYLITIS OF MULTIPLE SITES IN SPINE (MULTI): ICD-10-CM

## 2025-01-06 DIAGNOSIS — M54.12 CERVICAL RADICULOPATHY: ICD-10-CM

## 2025-01-06 DIAGNOSIS — M06.9 RHEUMATOID ARTHRITIS, INVOLVING UNSPECIFIED SITE, UNSPECIFIED WHETHER RHEUMATOID FACTOR PRESENT (MULTI): ICD-10-CM

## 2025-01-06 DIAGNOSIS — M54.50 CHRONIC RIGHT-SIDED LOW BACK PAIN WITHOUT SCIATICA: ICD-10-CM

## 2025-01-06 DIAGNOSIS — G89.29 CHRONIC RIGHT-SIDED LOW BACK PAIN WITHOUT SCIATICA: ICD-10-CM

## 2025-01-06 DIAGNOSIS — M54.12 RADICULOPATHY, CERVICAL REGION: ICD-10-CM

## 2025-01-06 DIAGNOSIS — M46.1 INFLAMMATION OF SACROILIAC JOINT (CMS-HCC): Primary | ICD-10-CM

## 2025-01-06 PROCEDURE — 1036F TOBACCO NON-USER: CPT | Performed by: PHYSICIAN ASSISTANT

## 2025-01-06 PROCEDURE — 99214 OFFICE O/P EST MOD 30 MIN: CPT | Performed by: PHYSICIAN ASSISTANT

## 2025-01-06 PROCEDURE — 3008F BODY MASS INDEX DOCD: CPT | Performed by: PHYSICIAN ASSISTANT

## 2025-01-06 RX ORDER — HYDROCODONE BITARTRATE AND ACETAMINOPHEN 10; 325 MG/1; MG/1
1 TABLET ORAL 4 TIMES DAILY PRN
Qty: 30 TABLET | Refills: 0 | Status: SHIPPED | OUTPATIENT
Start: 2025-01-07 | End: 2025-05-07

## 2025-01-06 RX ORDER — METHOCARBAMOL 750 MG/1
750 TABLET, FILM COATED ORAL 3 TIMES DAILY
Qty: 90 TABLET | Refills: 0 | Status: SHIPPED | OUTPATIENT
Start: 2025-01-06 | End: 2025-02-05

## 2025-01-06 ASSESSMENT — ENCOUNTER SYMPTOMS
PSYCHIATRIC NEGATIVE: 1
WEAKNESS: 1
NUMBNESS: 1
BACK PAIN: 1
EYES NEGATIVE: 1
HEMATOLOGIC/LYMPHATIC NEGATIVE: 1
RESPIRATORY NEGATIVE: 1
PAIN: 1
CARDIOVASCULAR NEGATIVE: 1
NECK PAIN: 1
GASTROINTESTINAL NEGATIVE: 1
CONSTITUTIONAL NEGATIVE: 1
ENDOCRINE NEGATIVE: 1

## 2025-01-06 ASSESSMENT — LIFESTYLE VARIABLES
AUDIT-C TOTAL SCORE: 0
HOW OFTEN DO YOU HAVE SIX OR MORE DRINKS ON ONE OCCASION: NEVER
HOW OFTEN DO YOU HAVE A DRINK CONTAINING ALCOHOL: NEVER
HOW MANY STANDARD DRINKS CONTAINING ALCOHOL DO YOU HAVE ON A TYPICAL DAY: PATIENT DOES NOT DRINK
SKIP TO QUESTIONS 9-10: 1

## 2025-01-06 ASSESSMENT — PAIN DESCRIPTION - DESCRIPTORS: DESCRIPTORS: ACHING

## 2025-01-06 ASSESSMENT — PATIENT HEALTH QUESTIONNAIRE - PHQ9
SUM OF ALL RESPONSES TO PHQ9 QUESTIONS 1 & 2: 2
2. FEELING DOWN, DEPRESSED OR HOPELESS: SEVERAL DAYS
1. LITTLE INTEREST OR PLEASURE IN DOING THINGS: SEVERAL DAYS
10. IF YOU CHECKED OFF ANY PROBLEMS, HOW DIFFICULT HAVE THESE PROBLEMS MADE IT FOR YOU TO DO YOUR WORK, TAKE CARE OF THINGS AT HOME, OR GET ALONG WITH OTHER PEOPLE: VERY DIFFICULT

## 2025-01-06 ASSESSMENT — PAIN SCALES - GENERAL
PAINLEVEL_OUTOF10: 6
PAINLEVEL_OUTOF10: 6

## 2025-01-06 ASSESSMENT — PAIN - FUNCTIONAL ASSESSMENT: PAIN_FUNCTIONAL_ASSESSMENT: 0-10

## 2025-01-06 NOTE — PROGRESS NOTES
Subjective   Patient ID: Meghan Hernández is a 49 y.o. female who presents for Pain.  Patient is a 49-year-old female with cervical radiculopathy rheumatoid arthritis inflammation of the SI joints bilaterally ankylosing spondylitis chronic right low back without sciatica the presents today for follow-up.  Patient has appointments with the rheumatologist and she is hoping that new therapeutic agents will provide her with more durable relief of her symptoms.  Patient recently had a right SI joint injection with Dr. Marcela Arce reported on 519.  Patient reports approximately 50 to 60% relief of her symptoms.  Patient still has remaining benefit from her cervical epidural injection but continues to have mostly axial symptoms in this region.  Patient is requesting a refill of the hydrocodone and the Robaxin.  Patient reports that the Robaxin is used maybe 1-3 times a week very sparingly depending upon spasms and pain.  Patient does do note that the hydrocodone is seeming about 50% better at pain control than her tramadol was providing.  Patient denies any adverse reactions to the combinations of her current medications and she has been given a Narcan delivery system by her pharmacist    Pain  Associated symptoms include weakness.       Review of Systems   Constitutional: Negative.    HENT: Negative.     Eyes: Negative.    Respiratory: Negative.     Cardiovascular: Negative.    Gastrointestinal: Negative.    Endocrine: Negative.    Musculoskeletal:  Positive for back pain and neck pain.   Skin: Negative.    Neurological:  Positive for weakness and numbness.   Hematological: Negative.    Psychiatric/Behavioral: Negative.         Objective   Physical Exam  Musculoskeletal:      Cervical back: Tenderness present. No spasms. Decreased range of motion.      Lumbar back: Spasms and tenderness present. Decreased range of motion. Negative right straight leg raise test and negative left straight leg raise test.        Back:        Comments: Internal/external hip range of motion grossly intact.  Normal cervical and lumbar bulk and tone.  DTRs sensation symmetrical in bilateral upper and lower extremities         Assessment/Plan   Problem List Items Addressed This Visit             ICD-10-CM    Radiculopathy, cervical region M54.12    Rheumatoid arthritis M06.9    Inflammation of sacroiliac joint (CMS-HCC) - Primary M46.1    Ankylosing spondylitis M45.9    Chronic right-sided low back pain without sciatica M54.50, G89.29   I had nice discussion with the patient today our plan will be as follows.      Radiology: [ none at this time ]      Physically:  [ continue modification of activities, healthy lifestyle choice ]      Psychologically:  [ No acute psychological concerns. There are no mental health issues of which I am aware that are contributing to the patient's pain. There are no substance abuse or alcohol abuse issues of which I am aware that are contributing to the patient's pain.  ]      Medication: [ I will refill the patient's opioids today for 1 month.  The patient continues to see benefit and improvement in their quality of life and ability to maintain ADLs.  Patient educated about the risks of taking opioids and operating a motor vehicle.  Patient reports no adverse side effects to current medication regimen.  Current regimen does allow patient to maintain ADLs.  Patient reports no new neurologic symptoms, new pain areas, or exacerbation in pain today.  Patient reports they are happy with current treatment care path.    [place]AR[place] was reviewed and was consistent with the history.    Patient has been educated on the risks, benefits, and alternatives of controlled substances as well as the proper way to store these medications.  The patient and I discussed the nature of this medication and its side effects.  We discussed tolerance, physical dependence, psychological dependence, addiction and opioid-induced hyperalgesia.  We discussed the  potential need to wean from this medication.  We discussed the availability of programs that can help with this process if necessary.  We discussed safety issues related to opioids including safe storage.  We discussed the fact that the patient should not drive an automobile or operate heavy machinery while taking this medication.  A prescription for naloxone was offered to the patient.  The patient will be re-evaluated for the need to continue opioid therapy in 30 days. ]      Duration:  [ 1 month ]      Intervention:  [ right SI joint injection of approximately 60% relief successful injection]           Alverto Young PA-C 01/06/25 9:52 AM

## 2025-01-06 NOTE — PROGRESS NOTES
MEDICATION NAME: Norco  STRENGTH:   LAST FILL DATE: 25  DATE LAST TAKEN: 25  QUANTITY FILLED: 20  QUANTITY REMAININ  COUNT COMPLETED BY: OMAR SALEEM and TYE BEASLEY      UDS LAST COMPLETED:   CONTROLLED SUBSTANCES AGREEMENT LAST SIGNED:   ORT LAST COMPLETED:  Modified [employer] disability form filled out annually.

## 2025-01-09 ENCOUNTER — APPOINTMENT (OUTPATIENT)
Dept: GASTROENTEROLOGY | Facility: CLINIC | Age: 50
End: 2025-01-09
Payer: COMMERCIAL

## 2025-01-09 VITALS — WEIGHT: 128 LBS | HEIGHT: 60 IN | BODY MASS INDEX: 25.13 KG/M2

## 2025-01-09 DIAGNOSIS — R19.7 DIARRHEA, UNSPECIFIED TYPE: Primary | ICD-10-CM

## 2025-01-09 DIAGNOSIS — K21.9 GASTROESOPHAGEAL REFLUX DISEASE, UNSPECIFIED WHETHER ESOPHAGITIS PRESENT: ICD-10-CM

## 2025-01-09 PROCEDURE — 3008F BODY MASS INDEX DOCD: CPT | Performed by: INTERNAL MEDICINE

## 2025-01-09 PROCEDURE — 99214 OFFICE O/P EST MOD 30 MIN: CPT | Performed by: INTERNAL MEDICINE

## 2025-01-09 NOTE — PROGRESS NOTES
REASON FOR VISIT: Discuss recent diarrhea  Visit was done via audiovisual telehealth.  Patient consented and understood audiovisual connection.    HPI:  Meghan Hernández is a 49 y.o. female\with visit to discuss recent diarrhea symptoms.  Seen in the past for dysphagia and found to have Schatzki's ring on upper endoscopy 2 years ago.  Colonoscopy then unremarkable.  Had dilation of the her Schatzki's ring and dysphagia resolved then.  Complaining of diarrhea that occurred starting mid November 2024.  Had been on Cosentyx which she started in November 2023 for rheumatoid arthritis and ankylosing spondylitis.  She was taken off her Cosentyx as it was not helping.  Diarrhea has been gradually improving over the past 4 weeks.  No rectal bleeding other than occasional hemorrhoid irritation.  No current focal abdominal pain symptoms.  Continues to have intermittent loose stools.          PRIOR ENDOSCOPY    PAST MEDICAL HISTORY  Past Medical History:   Diagnosis Date    Ankylosing spondylitis of site in spine (Multi) May2023    AS (ankylosing spondylitis) (Multi)     Chronic pain disorder Long time    Headache Entire life    Low back pain In my 20s    Migraine Entire life    Neck pain In my20s    Osteoarthritis May2023    Rheumatoid arthritis        PAST SURGICAL HISTORY  Past Surgical History:   Procedure Laterality Date    COLONOSCOPY      DILATION AND CURETTAGE OF UTERUS      ENDOSCOPY PROCEDURE NOT PERFORMED      EPIDURAL BLOCK INJECTION  Multiple       FAMILY HISTORY  Family History   Problem Relation Name Age of Onset    Thyroid disease Mother      Colon cancer Father Mynor     Cancer Father Mynor     Thyroid disease Sister      Cerebral palsy Brother          1/2 brother    Stomach cancer Maternal Grandmother      Breast cancer Other AUNT     Heart attack Other UNCLE     Arthritis Paternal Grandmother Shaniqua        SOCIAL HISTORY  Social History     Tobacco Use    Smoking status: Former     Current packs/day: 0.00      Average packs/day: 1 pack/day for 30.0 years (30.0 ttl pk-yrs)     Types: Cigarettes     Quit date: 10/1/2019     Years since quittin.2     Passive exposure: Past    Smokeless tobacco: Never   Substance Use Topics    Alcohol use: Not Currently     Alcohol/week: 2.0 standard drinks of alcohol     Types: 2 Glasses of wine per week       REVIEW OF SYSTEMS  CONSTITUTIONAL: negative for fever, chills, fatigue, or unintentional weight loss,   HEENT: negative for icteric sclera, eye pain/redness, or changes in vision/hearing  RESPIRATORY: negative for cough, hemoptysis, wheezing, orthopnea, or dyspnea on exertion  CARDIOVASCULAR: negative for chest pain, palpitations, or syncope   GASTROINTESTINAL: as noted per HPI  GENITOURINARY: negative for dysuria, polyuria, incontinence, or hematuria  MUSCULOSKELETAL: negative for arthralgia, myalgia, or joint swelling/stiffness   INTEGUMENTARY/SKIN: negative jaundice, rash, or skin lesion  HEMATOLOGIC/LYMPHATIC: negative for prolonged bleeding, easy bruising, or swollen lymph nodes  ENDOCRINE: negative for cold/heat intolerance, polydipsia, polyuria, or goiter  NEUROLOGIC: negative for headaches, dizziness, tremor, or gait abnormality  PSYCHIATRIC: negative for anxiety, depression, personality changes, or sleep disturbances      A 10 point review of systems was completed and was otherwise negative.    ALLERGIES  No Known Allergies    MEDICATIONS  Current Outpatient Medications   Medication Sig Dispense Refill    cyanocobalamin (Vitamin B-12) 100 mcg tablet Take by mouth. As directed      DULoxetine (Cymbalta) 60 mg DR capsule Take 1 capsule (60 mg) by mouth 2 times a day. 90 capsule 3    HYDROcodone-acetaminophen (Norco)  mg tablet Take 1 tablet by mouth 4 times a day as needed for severe pain (7 - 10). Do not fill before 2025. 30 tablet 0    iron,carb/vit C/vit B12/folic (IRON 100 PLUS ORAL) Take by mouth.      methocarbamol (Robaxin) 750 mg tablet Take 1 tablet  (750 mg) by mouth 3 times a day. 90 tablet 0    omeprazole (PriLOSEC) 40 mg DR capsule Take by mouth.      traZODone (Desyrel) 100 mg tablet Take 1 tablet (100 mg) by mouth as needed at bedtime for sleep. 90 tablet 1    naloxone (Narcan) 4 mg/0.1 mL nasal spray Administer 1 spray (4 mg) into affected nostril(s) if needed for opioid reversal or respiratory depression. (Patient not taking: Reported on 1/9/2025)      norethindrone-e.estradioL-iron (Lo Loestrin) 1 mg-10 mcg (24)/10 mcg (2) tablet Take 1 tablet by mouth once daily. 84 tablet 3     No current facility-administered medications for this visit.       VITALS  Ht 1.524 m (5')   Wt 58.1 kg (128 lb)   BMI 25.00 kg/m²      PHYSICAL EXAM  Alert and oriented in no acute distress    ASSESSMENT/ PLAN  Patient with recent diarrhea symptoms over the past 6 weeks has been gradually improving.  She did come off Cosentyx 4 weeks ago which may have been contributing to her diarrhea.  At this point recommended checking stool studies including C. difficile, stool pathogen  profile and ova and parasites.  If no further improvement may consider repeat colonoscopy to rule out underlying microscopic colitis or less likely inflammatory bowel disease.  I will follow-up with her on stool studies.  GERD symptoms controlled and dysphagia at this point controlled after dilation on endoscopy.  She is in agreement with the plan.        Signature: Roxy Teresa MD    Date: 1/9/2025  Time: 2:52 PM

## 2025-01-13 DIAGNOSIS — M54.50 CHRONIC RIGHT-SIDED LOW BACK PAIN WITHOUT SCIATICA: ICD-10-CM

## 2025-01-13 DIAGNOSIS — G89.29 CHRONIC RIGHT-SIDED LOW BACK PAIN WITHOUT SCIATICA: ICD-10-CM

## 2025-01-13 RX ORDER — HYDROCODONE BITARTRATE AND ACETAMINOPHEN 10; 325 MG/1; MG/1
1 TABLET ORAL 4 TIMES DAILY PRN
Qty: 30 TABLET | Refills: 0 | Status: SHIPPED | OUTPATIENT
Start: 2025-01-13 | End: 2025-05-13

## 2025-01-15 ENCOUNTER — OFFICE VISIT (OUTPATIENT)
Dept: OBSTETRICS AND GYNECOLOGY | Facility: CLINIC | Age: 50
End: 2025-01-15
Payer: COMMERCIAL

## 2025-01-15 VITALS
DIASTOLIC BLOOD PRESSURE: 80 MMHG | HEIGHT: 60 IN | WEIGHT: 131.4 LBS | BODY MASS INDEX: 25.8 KG/M2 | SYSTOLIC BLOOD PRESSURE: 114 MMHG

## 2025-01-15 DIAGNOSIS — Z12.11 SCREEN FOR COLON CANCER: ICD-10-CM

## 2025-01-15 DIAGNOSIS — R10.2 PELVIC PAIN IN FEMALE: ICD-10-CM

## 2025-01-15 DIAGNOSIS — N95.2 POSTMENOPAUSAL ATROPHIC VAGINITIS: ICD-10-CM

## 2025-01-15 DIAGNOSIS — Z01.419 WELL WOMAN EXAM WITH ROUTINE GYNECOLOGICAL EXAM: Primary | ICD-10-CM

## 2025-01-15 DIAGNOSIS — M81.0 POSTMENOPAUSAL BONE LOSS: ICD-10-CM

## 2025-01-15 DIAGNOSIS — Z87.891 HISTORY OF TOBACCO USE: ICD-10-CM

## 2025-01-15 DIAGNOSIS — Z30.41 SURVEILLANCE OF PREVIOUSLY PRESCRIBED CONTRACEPTIVE PILL: ICD-10-CM

## 2025-01-15 DIAGNOSIS — N95.1 PERIMENOPAUSE: ICD-10-CM

## 2025-01-15 DIAGNOSIS — N92.6 IRREGULAR MENSES: ICD-10-CM

## 2025-01-15 DIAGNOSIS — Z12.31 ENCOUNTER FOR SCREENING MAMMOGRAM FOR MALIGNANT NEOPLASM OF BREAST: ICD-10-CM

## 2025-01-15 PROCEDURE — 99396 PREV VISIT EST AGE 40-64: CPT | Performed by: OBSTETRICS & GYNECOLOGY

## 2025-01-15 PROCEDURE — 3008F BODY MASS INDEX DOCD: CPT | Performed by: OBSTETRICS & GYNECOLOGY

## 2025-01-15 PROCEDURE — 1036F TOBACCO NON-USER: CPT | Performed by: OBSTETRICS & GYNECOLOGY

## 2025-01-15 RX ORDER — SECUKINUMAB 150 MG/ML
300 INJECTION SUBCUTANEOUS
COMMUNITY

## 2025-01-15 ASSESSMENT — PATIENT HEALTH QUESTIONNAIRE - PHQ9
3. TROUBLE FALLING OR STAYING ASLEEP: NEARLY EVERY DAY
7. TROUBLE CONCENTRATING ON THINGS, SUCH AS READING THE NEWSPAPER OR WATCHING TELEVISION: SEVERAL DAYS
10. IF YOU CHECKED OFF ANY PROBLEMS, HOW DIFFICULT HAVE THESE PROBLEMS MADE IT FOR YOU TO DO YOUR WORK, TAKE CARE OF THINGS AT HOME, OR GET ALONG WITH OTHER PEOPLE: VERY DIFFICULT
6. FEELING BAD ABOUT YOURSELF - OR THAT YOU ARE A FAILURE OR HAVE LET YOURSELF OR YOUR FAMILY DOWN: NEARLY EVERY DAY
2. FEELING DOWN, DEPRESSED OR HOPELESS: NEARLY EVERY DAY
SUM OF ALL RESPONSES TO PHQ9 QUESTIONS 1 & 2: 5
8. MOVING OR SPEAKING SO SLOWLY THAT OTHER PEOPLE COULD HAVE NOTICED. OR THE OPPOSITE, BEING SO FIGETY OR RESTLESS THAT YOU HAVE BEEN MOVING AROUND A LOT MORE THAN USUAL: NEARLY EVERY DAY
5. POOR APPETITE OR OVEREATING: NOT AT ALL
SUM OF ALL RESPONSES TO PHQ QUESTIONS 1-9: 18
1. LITTLE INTEREST OR PLEASURE IN DOING THINGS: MORE THAN HALF THE DAYS
4. FEELING TIRED OR HAVING LITTLE ENERGY: NEARLY EVERY DAY
9. THOUGHTS THAT YOU WOULD BE BETTER OFF DEAD, OR OF HURTING YOURSELF: NOT AT ALL

## 2025-01-15 ASSESSMENT — ENCOUNTER SYMPTOMS
DIARRHEA: 1
NECK PAIN: 1
UNEXPECTED WEIGHT CHANGE: 1
BACK PAIN: 1
MYALGIAS: 1
DEPRESSION: 0
ARTHRALGIAS: 1
OCCASIONAL FEELINGS OF UNSTEADINESS: 1
FATIGUE: 1
ABDOMINAL PAIN: 1
LOSS OF SENSATION IN FEET: 0
SLEEP DISTURBANCE: 1
DYSPHORIC MOOD: 1

## 2025-01-15 ASSESSMENT — LIFESTYLE VARIABLES
AUDIT-C TOTAL SCORE: 0
HOW OFTEN DO YOU HAVE A DRINK CONTAINING ALCOHOL: NEVER
HOW OFTEN DO YOU HAVE SIX OR MORE DRINKS ON ONE OCCASION: NEVER
HOW MANY STANDARD DRINKS CONTAINING ALCOHOL DO YOU HAVE ON A TYPICAL DAY: PATIENT DOES NOT DRINK
SKIP TO QUESTIONS 9-10: 1

## 2025-01-15 ASSESSMENT — PAIN SCALES - GENERAL: PAINLEVEL_OUTOF10: 4

## 2025-01-17 DIAGNOSIS — M54.50 CHRONIC RIGHT-SIDED LOW BACK PAIN WITHOUT SCIATICA: ICD-10-CM

## 2025-01-17 DIAGNOSIS — G89.29 CHRONIC RIGHT-SIDED LOW BACK PAIN WITHOUT SCIATICA: ICD-10-CM

## 2025-01-20 ENCOUNTER — HOSPITAL ENCOUNTER (OUTPATIENT)
Dept: RADIOLOGY | Facility: HOSPITAL | Age: 50
Discharge: HOME | End: 2025-01-20
Payer: COMMERCIAL

## 2025-01-20 ENCOUNTER — TELEPHONE (OUTPATIENT)
Dept: OBSTETRICS AND GYNECOLOGY | Facility: CLINIC | Age: 50
End: 2025-01-20
Payer: COMMERCIAL

## 2025-01-20 DIAGNOSIS — R59.0 LOCALIZED ENLARGED LYMPH NODES: ICD-10-CM

## 2025-01-20 DIAGNOSIS — R10.2 PELVIC PAIN IN FEMALE: ICD-10-CM

## 2025-01-20 PROCEDURE — 76856 US EXAM PELVIC COMPLETE: CPT | Performed by: RADIOLOGY

## 2025-01-20 PROCEDURE — 76830 TRANSVAGINAL US NON-OB: CPT

## 2025-01-20 PROCEDURE — 76770 US EXAM ABDO BACK WALL COMP: CPT

## 2025-01-20 PROCEDURE — 76830 TRANSVAGINAL US NON-OB: CPT | Performed by: RADIOLOGY

## 2025-01-20 RX ORDER — HYDROCODONE BITARTRATE AND ACETAMINOPHEN 10; 325 MG/1; MG/1
1 TABLET ORAL 4 TIMES DAILY PRN
Qty: 120 TABLET | Refills: 0 | Status: SHIPPED | OUTPATIENT
Start: 2025-01-21 | End: 2025-02-20

## 2025-01-20 NOTE — TELEPHONE ENCOUNTER
Spoke to Flora at [facility] to advise that Dr Johnson did not order Thyroid US / order was from another provider / Advised that they need to make sure that results are going to the right provider and not [facility] / Per Flora ordering provider is correct at this time that [facility] was called in error .

## 2025-01-20 NOTE — TELEPHONE ENCOUNTER
[facility] dept calling requesting order change from Thyroid US to neck and lymph node US / as pt states that her complaint is for her lymph nodes in her neck and not near thyroid area / advised Dr Johnson out of the office today to get new order / when await to talk to KB to see if she would changer order / Zoe from [facility] said that they got permission to change order from their supervisor / Gonzales Shrestha pt is aware that if Dr Johnson wants US of Thyroid she would need to come back in and  would then also have new charge  for thyroid [facility]  / FYI msg to Dr Johnson .

## 2025-02-04 ENCOUNTER — OFFICE VISIT (OUTPATIENT)
Dept: PAIN MEDICINE | Facility: CLINIC | Age: 50
End: 2025-02-04
Payer: COMMERCIAL

## 2025-02-04 VITALS
WEIGHT: 131 LBS | OXYGEN SATURATION: 97 % | BODY MASS INDEX: 25.72 KG/M2 | HEIGHT: 60 IN | DIASTOLIC BLOOD PRESSURE: 70 MMHG | HEART RATE: 76 BPM | SYSTOLIC BLOOD PRESSURE: 116 MMHG | RESPIRATION RATE: 18 BRPM

## 2025-02-04 DIAGNOSIS — M47.812 CERVICAL SPONDYLOSIS: Primary | ICD-10-CM

## 2025-02-04 DIAGNOSIS — M54.12 CERVICAL RADICULOPATHY: ICD-10-CM

## 2025-02-04 DIAGNOSIS — G89.29 CHRONIC RIGHT-SIDED LOW BACK PAIN WITHOUT SCIATICA: ICD-10-CM

## 2025-02-04 DIAGNOSIS — M45.9 ANKYLOSING SPONDYLITIS, UNSPECIFIED SITE OF SPINE (MULTI): ICD-10-CM

## 2025-02-04 DIAGNOSIS — M54.50 CHRONIC RIGHT-SIDED LOW BACK PAIN WITHOUT SCIATICA: ICD-10-CM

## 2025-02-04 LAB — C COLI+JEJUNI+LARI FUSA STL QL NAA+PROBE: NORMAL

## 2025-02-04 PROCEDURE — 99214 OFFICE O/P EST MOD 30 MIN: CPT | Performed by: PHYSICIAN ASSISTANT

## 2025-02-04 PROCEDURE — 3008F BODY MASS INDEX DOCD: CPT | Performed by: PHYSICIAN ASSISTANT

## 2025-02-04 PROCEDURE — 1036F TOBACCO NON-USER: CPT | Performed by: PHYSICIAN ASSISTANT

## 2025-02-04 RX ORDER — METHOCARBAMOL 750 MG/1
750 TABLET, FILM COATED ORAL 3 TIMES DAILY
Qty: 90 TABLET | Refills: 0 | Status: SHIPPED | OUTPATIENT
Start: 2025-02-04 | End: 2025-03-06

## 2025-02-04 RX ORDER — HYDROCODONE BITARTRATE AND ACETAMINOPHEN 10; 325 MG/1; MG/1
1 TABLET ORAL 4 TIMES DAILY PRN
Qty: 120 TABLET | Refills: 0 | Status: SHIPPED | OUTPATIENT
Start: 2025-02-20 | End: 2025-03-22

## 2025-02-04 ASSESSMENT — PAIN - FUNCTIONAL ASSESSMENT: PAIN_FUNCTIONAL_ASSESSMENT: 0-10

## 2025-02-04 ASSESSMENT — PATIENT HEALTH QUESTIONNAIRE - PHQ9
10. IF YOU CHECKED OFF ANY PROBLEMS, HOW DIFFICULT HAVE THESE PROBLEMS MADE IT FOR YOU TO DO YOUR WORK, TAKE CARE OF THINGS AT HOME, OR GET ALONG WITH OTHER PEOPLE: VERY DIFFICULT
1. LITTLE INTEREST OR PLEASURE IN DOING THINGS: SEVERAL DAYS
2. FEELING DOWN, DEPRESSED OR HOPELESS: SEVERAL DAYS
SUM OF ALL RESPONSES TO PHQ9 QUESTIONS 1 & 2: 2

## 2025-02-04 ASSESSMENT — LIFESTYLE VARIABLES
SKIP TO QUESTIONS 9-10: 1
HOW OFTEN DO YOU HAVE SIX OR MORE DRINKS ON ONE OCCASION: NEVER
HOW MANY STANDARD DRINKS CONTAINING ALCOHOL DO YOU HAVE ON A TYPICAL DAY: PATIENT DOES NOT DRINK
HOW OFTEN DO YOU HAVE A DRINK CONTAINING ALCOHOL: NEVER
AUDIT-C TOTAL SCORE: 0

## 2025-02-04 ASSESSMENT — ENCOUNTER SYMPTOMS
ENDOCRINE NEGATIVE: 1
WEAKNESS: 1
NECK PAIN: 1
CONSTITUTIONAL NEGATIVE: 1
RESPIRATORY NEGATIVE: 1
PSYCHIATRIC NEGATIVE: 1
HEMATOLOGIC/LYMPHATIC NEGATIVE: 1
EYES NEGATIVE: 1
NUMBNESS: 1
CARDIOVASCULAR NEGATIVE: 1
GASTROINTESTINAL NEGATIVE: 1
BACK PAIN: 1

## 2025-02-04 ASSESSMENT — PAIN SCALES - GENERAL
PAINLEVEL_OUTOF10: 7
PAINLEVEL_OUTOF10: 7

## 2025-02-04 ASSESSMENT — PAIN DESCRIPTION - DESCRIPTORS: DESCRIPTORS: ACHING

## 2025-02-04 NOTE — PROGRESS NOTES
Subjective   Patient ID: Meghan Hernández is a 49 y.o. female who presents for Back Pain.  49-year-old female with rheumatoid negative arthritis ankylosing spondylitis right sided chronic low back pain SI joint pain cervical spondylosis who presents today for follow-up.  Patient's been put on Xeljanz she is only been on this 2 weeks.  She did notes that she has been having the sensation of movement in her SI joint regional pain.  She did notes that she has been having aching pain within her neck sometimes radiates to the mid back and between the scapular region.  Also denies intermittent and nonreproducible subscapular symptoms bilaterally.    Back Pain  Associated symptoms include numbness and weakness.       Review of Systems   Constitutional: Negative.    HENT: Negative.     Eyes: Negative.    Respiratory: Negative.     Cardiovascular: Negative.    Gastrointestinal: Negative.    Endocrine: Negative.    Musculoskeletal:  Positive for back pain and neck pain.   Skin: Negative.    Neurological:  Positive for weakness and numbness.   Hematological: Negative.    Psychiatric/Behavioral: Negative.         Objective   Physical Exam  Musculoskeletal:      Cervical back: Tenderness present. No spasms. Decreased range of motion.      Lumbar back: Spasms and tenderness present. Decreased range of motion. Negative right straight leg raise test and negative left straight leg raise test.      Comments: Internal/external hip range of motion grossly intact.  Normal cervical and lumbar bulk and tone.  DTRs sensation symmetrical in bilateral upper and lower extremities  Positive facet loading. Neg spurlings.          Assessment/Plan   Problem List Items Addressed This Visit             ICD-10-CM    Ankylosing spondylitis M45.9    Chronic right-sided low back pain without sciatica M54.50, G89.29     Other Visit Diagnoses         Codes    Cervical spondylosis    -  Primary M47.812        I had nice discussion with the patient today our  plan will be as follows.      Radiology: [ none at this time ]      Physically:  [ continue modification of activities, healthy lifestyle choice ]      Psychologically:  [ No acute psychological concerns. There are no mental health issues of which I am aware that are contributing to the patient's pain. There are no substance abuse or alcohol abuse issues of which I am aware that are contributing to the patient's pain.  ]      Medication: [ I will refill the patient's opioids today for 1 month.  The patient continues to see benefit and improvement in their quality of life and ability to maintain ADLs.  Patient educated about the risks of taking opioids and operating a motor vehicle.  Patient reports no adverse side effects to current medication regimen.  Current regimen does allow patient to maintain ADLs.  Patient reports no new neurologic symptoms, new pain areas, or exacerbation in pain today.  Patient reports they are happy with current treatment care path.    OAR[place] was reviewed and was consistent with the history.    Patient has been educated on the risks, benefits, and alternatives of controlled substances as well as the proper way to store these medications.  The patient and I discussed the nature of this medication and its side effects.  We discussed tolerance, physical dependence, psychological dependence, addiction and opioid-induced hyperalgesia.  We discussed the potential need to wean from this medication.  We discussed the availability of programs that can help with this process if necessary.  We discussed safety issues related to opioids including safe storage.  We discussed the fact that the patient should not drive an automobile or operate heavy machinery while taking this medication.  A prescription for naloxone was offered to the patient.  The patient will be re-evaluated for the need to continue opioid therapy in 30 days. ]      Duration:  [ 1 month ]      Intervention:  [ For diagnostic purposes  of might be prudent to proceed forward with the C5-6 C6-7 based on arthritic facet findings on imaging.  Discussed diagnostic blocks for potentially pain for this regional symptoms patient verbalized understanding.  Proceed forward with cervical medial branch blocks at the C5-6 C6-7 local sedation fluoroscopy guidance [facility] with [facility]  Patient understands that the ultimate goal would likely be radiofrequency ablations predicated on diagnostic testing  ]        Please note that this report has been produced using speech recognition software. It may contain errors related to grammar, punctuation or spelling. Electronically signed, but not reviewed.            Alverto Young PA-C 02/04/25 11:49 AM

## 2025-02-04 NOTE — PROGRESS NOTES
MEDICATION NAME: Norco  STRENGTH:   LAST FILL DATE: 25  DATE LAST TAKEN: 25  QUANTITY FILLED: 120  QUANTITY REMAININ  COUNT COMPLETED BY: JOSE HADDAD LPN and TYE BEASLEY      UDS LAST COMPLETED:   CONTROLLED SUBSTANCES AGREEMENT LAST SIGNED:   ORT LAST COMPLETED:  Modified [employer] disability form filled out annually.

## 2025-02-05 ENCOUNTER — TELEPHONE (OUTPATIENT)
Dept: PAIN MEDICINE | Facility: CLINIC | Age: 50
End: 2025-02-05
Payer: COMMERCIAL

## 2025-02-07 LAB
C DIFF TOX GENS STL QL NAA+PROBE: NOT DETECTED
CRYPTOSP AG STL QL IA: NORMAL
G LAMBLIA AG STL QL IA: NORMAL
O+P STL CONC: NORMAL
O+P STL TRI STN: NORMAL

## 2025-02-20 LAB
CRYPTOSP AG STL QL IA: NORMAL
G LAMBLIA AG STL QL IA: NORMAL
O+P STL TRI STN: NORMAL

## 2025-03-04 ENCOUNTER — OFFICE VISIT (OUTPATIENT)
Dept: PAIN MEDICINE | Facility: CLINIC | Age: 50
End: 2025-03-04
Payer: COMMERCIAL

## 2025-03-04 VITALS
BODY MASS INDEX: 25.72 KG/M2 | SYSTOLIC BLOOD PRESSURE: 106 MMHG | WEIGHT: 131 LBS | DIASTOLIC BLOOD PRESSURE: 70 MMHG | HEART RATE: 75 BPM | HEIGHT: 60 IN | RESPIRATION RATE: 18 BRPM | OXYGEN SATURATION: 97 %

## 2025-03-04 DIAGNOSIS — M54.50 CHRONIC RIGHT-SIDED LOW BACK PAIN WITHOUT SCIATICA: ICD-10-CM

## 2025-03-04 DIAGNOSIS — M46.1 INFLAMMATION OF SACROILIAC JOINT (CMS-HCC): ICD-10-CM

## 2025-03-04 DIAGNOSIS — M54.12 CERVICAL RADICULOPATHY: ICD-10-CM

## 2025-03-04 DIAGNOSIS — R23.8 CHANGE OF SKIN COLOR: Primary | ICD-10-CM

## 2025-03-04 DIAGNOSIS — G89.29 CHRONIC RIGHT-SIDED LOW BACK PAIN WITHOUT SCIATICA: ICD-10-CM

## 2025-03-04 PROCEDURE — 3008F BODY MASS INDEX DOCD: CPT | Performed by: PHYSICIAN ASSISTANT

## 2025-03-04 PROCEDURE — 99214 OFFICE O/P EST MOD 30 MIN: CPT | Performed by: PHYSICIAN ASSISTANT

## 2025-03-04 PROCEDURE — 1036F TOBACCO NON-USER: CPT | Performed by: PHYSICIAN ASSISTANT

## 2025-03-04 RX ORDER — METHOCARBAMOL 750 MG/1
750 TABLET, FILM COATED ORAL 3 TIMES DAILY
Qty: 90 TABLET | Refills: 0 | Status: SHIPPED | OUTPATIENT
Start: 2025-03-04 | End: 2025-04-03

## 2025-03-04 RX ORDER — HYDROCODONE BITARTRATE AND ACETAMINOPHEN 10; 325 MG/1; MG/1
1 TABLET ORAL 4 TIMES DAILY PRN
Qty: 120 TABLET | Refills: 0 | Status: SHIPPED | OUTPATIENT
Start: 2025-03-22 | End: 2025-04-21

## 2025-03-04 ASSESSMENT — PAIN SCALES - GENERAL
PAINLEVEL_OUTOF10: 8
PAINLEVEL_OUTOF10: 8

## 2025-03-04 ASSESSMENT — LIFESTYLE VARIABLES
HOW OFTEN DO YOU HAVE A DRINK CONTAINING ALCOHOL: NEVER
HOW MANY STANDARD DRINKS CONTAINING ALCOHOL DO YOU HAVE ON A TYPICAL DAY: PATIENT DOES NOT DRINK
SKIP TO QUESTIONS 9-10: 1
AUDIT-C TOTAL SCORE: 0
HOW OFTEN DO YOU HAVE SIX OR MORE DRINKS ON ONE OCCASION: NEVER

## 2025-03-04 ASSESSMENT — ENCOUNTER SYMPTOMS
CARDIOVASCULAR NEGATIVE: 1
NECK PAIN: 1
BACK PAIN: 1
PSYCHIATRIC NEGATIVE: 1
ENDOCRINE NEGATIVE: 1
CONSTITUTIONAL NEGATIVE: 1
WEAKNESS: 1
HEMATOLOGIC/LYMPHATIC NEGATIVE: 1
GASTROINTESTINAL NEGATIVE: 1
NUMBNESS: 1
EYES NEGATIVE: 1
RESPIRATORY NEGATIVE: 1

## 2025-03-04 ASSESSMENT — PAIN - FUNCTIONAL ASSESSMENT: PAIN_FUNCTIONAL_ASSESSMENT: 0-10

## 2025-03-04 ASSESSMENT — PATIENT HEALTH QUESTIONNAIRE - PHQ9
2. FEELING DOWN, DEPRESSED OR HOPELESS: NOT AT ALL
1. LITTLE INTEREST OR PLEASURE IN DOING THINGS: NOT AT ALL
SUM OF ALL RESPONSES TO PHQ9 QUESTIONS 1 & 2: 0

## 2025-03-04 ASSESSMENT — PAIN DESCRIPTION - DESCRIPTORS: DESCRIPTORS: ACHING

## 2025-03-04 NOTE — PROGRESS NOTES
MEDICATION NAME: Norco  STRENGTH:   LAST FILL DATE: 25  DATE LAST TAKEN: 3/4/25  QUANTITY FILLED: 120  QUANTITY REMAININ  COUNT COMPLETED BY: JOSE HADDAD LPN and TYE BEASLEY      UDS LAST COMPLETED:   CONTROLLED SUBSTANCES AGREEMENT LAST SIGNED:   ORT LAST COMPLETED:  Modified [employer] disability form filled out annually.

## 2025-03-04 NOTE — H&P (VIEW-ONLY)
Subjective   Patient ID: Meghan Hernández is a 49 y.o. female who presents for Neck Pain.  Is a 49-year-old female with SI joint arthritis lumbar spondylosis cervical spondylosis presents today for follow-up.  Patient has an appointment with her physician Dr. NUR for her C MBB's.  Patient has been utilizing heat even against advice and did not actually burn her lumbar back.  She states that this the only thing that is really helping with her lower back pain.  The SI joint pain is still reduced but the radiofrequency ablation from July has not been repeated and the pain has returned.    Neck Pain   Associated symptoms include numbness and weakness.       Review of Systems   Constitutional: Negative.    HENT: Negative.     Eyes: Negative.    Respiratory: Negative.     Cardiovascular: Negative.    Gastrointestinal: Negative.    Endocrine: Negative.    Musculoskeletal:  Positive for back pain and neck pain.   Skin: Negative.    Neurological:  Positive for weakness and numbness.   Hematological: Negative.    Psychiatric/Behavioral: Negative.         Objective   Physical Exam  Musculoskeletal:      Cervical back: Tenderness present. No spasms. Decreased range of motion.      Lumbar back: Spasms and tenderness present. Decreased range of motion. Negative right straight leg raise test and negative left straight leg raise test.        Back:       Comments: Internal/external hip range of motion grossly intact.  Normal cervical and lumbar bulk and tone.  DTRs sensation symmetrical in bilateral upper and lower extremities  Positive facet loading. Neg spurlings.     Central area bandage covering a burn mottling noted in the lumbar region         Assessment/Plan   Problem List Items Addressed This Visit             ICD-10-CM    Chronic right-sided low back pain without sciatica M54.50, G89.29     Other Visit Diagnoses         Codes    Cervical radiculopathy     M54.12          I had nice discussion with the patient today our plan will  be as follows.      Radiology: [ none at this time ]      Physically:  [ continue modification of activities, healthy lifestyle choice ]      Psychologically:  [ No acute psychological concerns. There are no mental health issues of which I am aware that are contributing to the patient's pain. There are no substance abuse or alcohol abuse issues of which I am aware that are contributing to the patient's pain.  Patient advised to stop utilizing heat as she is causing vascular changes to her lumbar region]      Medication: [ I will refill the patient's opioids today for 2 month.  The patient continues to see benefit and improvement in their quality of life and ability to maintain ADLs.  Patient educated about the risks of taking opioids and operating a motor vehicle.  Patient reports no adverse side effects to current medication regimen.  Current regimen does allow patient to maintain ADLs.  Patient reports no new neurologic symptoms, new pain areas, or exacerbation in pain today.  Patient reports they are happy with current treatment care path.    OARRS was reviewed and was consistent with the history.    Patient has been educated on the risks, benefits, and alternatives of controlled substances as well as the proper way to store these medications.  The patient and I discussed the nature of this medication and its side effects.  We discussed tolerance, physical dependence, psychological dependence, addiction and opioid-induced hyperalgesia.  We discussed the potential need to wean from this medication.  We discussed the availability of programs that can help with this process if necessary.  We discussed safety issues related to opioids including safe storage.  We discussed the fact that the patient should not drive an automobile or operate heavy machinery while taking this medication.  A prescription for naloxone was offered to the patient.  The patient will be re-evaluated for the need to continue opioid therapy in  60 days. ]      Duration:  [ 2 months ]      Intervention:  [ none at this time. Patient is stable.  ]        Please note that this report has been produced using speech recognition software. It may contain errors related to grammar, punctuation or spelling. Electronically signed, but not reviewed.          Alverto Young PA-C 03/04/25 11:26 AM

## 2025-03-06 ENCOUNTER — APPOINTMENT (OUTPATIENT)
Dept: GASTROENTEROLOGY | Facility: HOSPITAL | Age: 50
End: 2025-03-06
Payer: COMMERCIAL

## 2025-03-06 ENCOUNTER — HOSPITAL ENCOUNTER (OUTPATIENT)
Dept: GASTROENTEROLOGY | Facility: HOSPITAL | Age: 50
Discharge: HOME | End: 2025-03-06
Payer: COMMERCIAL

## 2025-03-06 VITALS
SYSTOLIC BLOOD PRESSURE: 109 MMHG | OXYGEN SATURATION: 100 % | RESPIRATION RATE: 16 BRPM | HEIGHT: 60 IN | TEMPERATURE: 97.7 F | BODY MASS INDEX: 25.72 KG/M2 | DIASTOLIC BLOOD PRESSURE: 58 MMHG | WEIGHT: 131 LBS | HEART RATE: 85 BPM

## 2025-03-06 DIAGNOSIS — M47.812 CERVICAL SPONDYLOSIS: ICD-10-CM

## 2025-03-06 PROCEDURE — 64490 INJ PARAVERT F JNT C/T 1 LEV: CPT | Performed by: ANESTHESIOLOGY

## 2025-03-06 PROCEDURE — 2500000004 HC RX 250 GENERAL PHARMACY W/ HCPCS (ALT 636 FOR OP/ED): Performed by: ANESTHESIOLOGY

## 2025-03-06 RX ORDER — BUPIVACAINE HYDROCHLORIDE 5 MG/ML
INJECTION, SOLUTION EPIDURAL; INTRACAUDAL AS NEEDED
Status: COMPLETED | OUTPATIENT
Start: 2025-03-06 | End: 2025-03-06

## 2025-03-06 RX ADMIN — BUPIVACAINE HYDROCHLORIDE 1 ML: 5 INJECTION, SOLUTION EPIDURAL; INTRACAUDAL; PERINEURAL at 08:14

## 2025-03-06 ASSESSMENT — PAIN SCALES - GENERAL
PAINLEVEL_OUTOF10: 7
PAINLEVEL_OUTOF10: 8

## 2025-03-06 ASSESSMENT — PAIN - FUNCTIONAL ASSESSMENT
PAIN_FUNCTIONAL_ASSESSMENT: 0-10
PAIN_FUNCTIONAL_ASSESSMENT: 0-10

## 2025-03-06 ASSESSMENT — PAIN DESCRIPTION - DESCRIPTORS: DESCRIPTORS: THROBBING

## 2025-03-06 NOTE — DISCHARGE INSTRUCTIONS
DISCHARGE INSTRUCTIONS FOR INJECTIONS     You underwent left cervical medial branch nerve blocks today    Aftermost injections, it is recommended that you relax and limit your activity for the remainder of the day unless you have been told otherwise by your pain physician.  You should not drive a car, operate machinery, or make important legal decisions unless otherwise directed by your pain physician.  You may resume your normal activity, including exercise, tomorrow.      Keep a written pain diary of how much pain relief you experienced following the injection procedure and the length of time of pain relief you experienced pain relief. Following diagnostic injections like medial branch nerve blocks, sacroiliac joint blocks, stellate ganglion injections and other blocks, it is very important you record the specific amount of pain relief you experienced immediately after the injectionand how long it lasted. Your doctor will ask you for this information at your follow up visit.     For all injections, please keep the injection site dry and inspect the site for a couple of days. You may remove the Band-Aid the day of the injection at any time.     Some discomfort, bruising or slight swelling may occur at the injection site. This is not abnormal if it occurs.  If needed you may:    -Take over the counter medication such as Tylenol or Motrin.   -Apply an ice pack for 30 minutes, 2 to 3 times a day for the first 24 hours.     You may shower today; no soaking baths, hot tubs, whirlpools or swimming pools for two days.      If you are given steroids in your injection, it may take 3-5 days for the steroid medication to take effect. You may notice a worsening of your symptoms for 1-2 days after the injection. This is not abnormal.  You may use acetaminophen, ibuprofen, or prescription medication that your doctor may have prescribed for you if you need to do so.     A few common side effects of steroids include facial  flushing, sweating, restlessness, irritability,difficulty sleeping, increase in blood sugar, and increased blood pressure. If you have diabetes, please monitor your blood sugar at least once a day for at least 5 days. If you have poorly controlled high blood pressure, monitoryour blood pressure for at least 2 days and contact your primary care physician if these numbers are unusually high for you.      If you take aspirin or non-steroidal anti-inflammatory drugs (examples are Motrin, Advil, ibuprofen, Naprosyn, Voltaren, Relafen, etc.) you may restart these this evening, but stop taking it 3 days before your next appointment, unless instructed otherwiseby your physician.      You do not need to discontinue non-aspirin-containing pain medications prior to an injection (examples: Celebrex, tramadol, hydrocodone and acetaminophen).      If you take a blood thinning medication (Coumadin, Lovenox, Fragmin,Ticlid, Plavix, Pradaxa, etc.), please discuss this with your primary care physician/cardiologist and your pain physician. These medications MUST be discontinued before you can have an injection safely, without the risk of uncontrolled bleeding. If these medications are not discontinued for an appropriate period of time, you will not be able to receivean injection.      If you are taking Coumadin, please have your INR checked the morning of your procedure and bringthe result to your appointment unless otherwise instructed. If your INR is over 1.2, your injection may need to be rescheduled to avoid uncontrolled bleeding from the needle placement.     Call [facility] at [phone] between 8am-4pm Monday - Friday if you are experiencing the following:    If you received an epidural or spinal injection:    -Headache that doesnot go away with medicine, is worse when sitting or standing up, and is greatly relieved upon lying down.   -Severe pain worse than or different than your baseline pain.   -Chills  or fever (101º F or greater).   -Drainage or signs of infection at the injection site     Go directly to the Emergency Department if you are experiencing the following and received an epidural or spinal injection:   -Abrupt weakness or progressive weakness in your legs that starts after you leave the clinic.   -Abrupt severe or worsening numbness in your legs.   -Inability to urinate after the injection or loss of bowel or bladder control without the urge to defecate or urinate.     If you have a clinical question that cannot wait until your next appointment, please call [phone] between 8am-4pm Monday - Friday or send a [facility] message. We do our best to return all non-emergency messages within 24 hours, Monday - Friday. A nurse or physician will return your message.      If you need to cancel an appointment, please call the scheduling staff at [phone] during normal business hours or leave a message at least 24 hours in advance.     If you are going to be sedated for your next procedure, you MUST have responsible adult who can legally drive accompany you home. You cannot eat or drink for eight hours prior to the planned procedure if you are going to receive sedation. You may take your non-blood thinning medications with a small sip of water.

## 2025-03-07 DIAGNOSIS — F32.A DEPRESSION, UNSPECIFIED DEPRESSION TYPE: ICD-10-CM

## 2025-03-11 RX ORDER — DULOXETIN HYDROCHLORIDE 60 MG/1
60 CAPSULE, DELAYED RELEASE ORAL 2 TIMES DAILY
Qty: 90 CAPSULE | Refills: 1 | Status: SHIPPED | OUTPATIENT
Start: 2025-03-11 | End: 2025-06-09

## 2025-04-09 ENCOUNTER — OFFICE VISIT (OUTPATIENT)
Dept: PAIN MEDICINE | Facility: CLINIC | Age: 50
End: 2025-04-09
Payer: COMMERCIAL

## 2025-04-09 VITALS — HEART RATE: 77 BPM | DIASTOLIC BLOOD PRESSURE: 60 MMHG | OXYGEN SATURATION: 98 % | SYSTOLIC BLOOD PRESSURE: 102 MMHG

## 2025-04-09 DIAGNOSIS — M79.7 FIBROMYALGIA: ICD-10-CM

## 2025-04-09 DIAGNOSIS — M47.817 LUMBOSACRAL SPONDYLOSIS WITHOUT MYELOPATHY: ICD-10-CM

## 2025-04-09 DIAGNOSIS — M54.12 CERVICAL RADICULOPATHY: ICD-10-CM

## 2025-04-09 DIAGNOSIS — M54.12 RADICULOPATHY, CERVICAL REGION: Primary | ICD-10-CM

## 2025-04-09 DIAGNOSIS — M47.812 CERVICAL SPONDYLOSIS: ICD-10-CM

## 2025-04-09 DIAGNOSIS — Z79.891 LONG TERM (CURRENT) USE OF OPIATE ANALGESIC: ICD-10-CM

## 2025-04-09 DIAGNOSIS — M46.1 SACROILIITIS, NOT ELSEWHERE CLASSIFIED: ICD-10-CM

## 2025-04-09 PROBLEM — M43.06 LUMBAR SPONDYLOLYSIS: Status: ACTIVE | Noted: 2025-04-09

## 2025-04-09 PROCEDURE — 99214 OFFICE O/P EST MOD 30 MIN: CPT | Performed by: ANESTHESIOLOGY

## 2025-04-09 PROCEDURE — G2211 COMPLEX E/M VISIT ADD ON: HCPCS | Performed by: ANESTHESIOLOGY

## 2025-04-09 RX ORDER — OXYCODONE AND ACETAMINOPHEN 7.5; 325 MG/1; MG/1
1 TABLET ORAL EVERY 6 HOURS PRN
Qty: 120 TABLET | Refills: 0 | Status: SHIPPED | OUTPATIENT
Start: 2025-04-21 | End: 2025-05-21

## 2025-04-09 RX ORDER — METHOCARBAMOL 750 MG/1
750 TABLET, FILM COATED ORAL 3 TIMES DAILY PRN
Qty: 90 TABLET | Refills: 2 | Status: SHIPPED | OUTPATIENT
Start: 2025-04-09 | End: 2025-07-08

## 2025-04-09 ASSESSMENT — PAIN SCALES - GENERAL
PAINLEVEL_OUTOF10: 8
PAINLEVEL_OUTOF10: 8

## 2025-04-09 ASSESSMENT — ENCOUNTER SYMPTOMS
HEMATOLOGIC/LYMPHATIC NEGATIVE: 1
CARDIOVASCULAR NEGATIVE: 1
CONSTITUTIONAL NEGATIVE: 1
GASTROINTESTINAL NEGATIVE: 1
NECK PAIN: 1
EYES NEGATIVE: 1
RESPIRATORY NEGATIVE: 1
ENDOCRINE NEGATIVE: 1
LOSS OF SENSATION IN FEET: 0
WEAKNESS: 1
DEPRESSION: 0
OCCASIONAL FEELINGS OF UNSTEADINESS: 0
BACK PAIN: 1
NUMBNESS: 1
PSYCHIATRIC NEGATIVE: 1

## 2025-04-09 ASSESSMENT — PAIN DESCRIPTION - DESCRIPTORS: DESCRIPTORS: THROBBING;ACHING;STABBING

## 2025-04-09 ASSESSMENT — PAIN - FUNCTIONAL ASSESSMENT: PAIN_FUNCTIONAL_ASSESSMENT: 0-10

## 2025-04-09 NOTE — PROGRESS NOTES
PAIN MANAGEMENT FOLLOW-UP OFFICE NOTE    Date of Service: 4/9/2025    SUBJECTIVE    CHIEF COMPLAINT: LB pain    HISTORY OF PRESENT ILLNESS    Meghan Hernández is a 49 y.o. female with PMH MDD, RA, ankylosing spondylitis who presents for follow-up LB pain.     On 3/6, pt underwent L C5-6 MBNB with 85% relief 4 h. She is looking forward to repeat. Seeing rheum and awaiting to start Rinvoq.     Pt denies new-onset numbness, weakness, bowel/bladder incontinence.  Pt denies recent infection, allergy to Latex/iodine/contrast. Patient is currently taking the following blood thinner(s): N/A    Procedure Log:  -L C5-6 MBNB #1 3/6/25: 85% relief   -R SIJ CSI 12/19/24: 50% relief 1 mo  -C7-T1 GINA 10/17/24: 100% relief BUE, 25% relief neck  -BL LMBN RFA 7/11/24: 60% ongoing  -R SIJ CSI 1/25/24: 50% relief 8 mo  -C7-T1 GINA 12/28/23: 80% relief 8 mo  -C7-T1 GINA 10/26/23: 100% relief RUE, 40% neck      REVIEW OF SYSTEMS  Review of Systems   Constitutional: Negative.    HENT: Negative.     Eyes: Negative.    Respiratory: Negative.     Cardiovascular: Negative.    Gastrointestinal: Negative.    Endocrine: Negative.    Musculoskeletal:  Positive for back pain and neck pain.   Skin: Negative.    Neurological:  Positive for weakness and numbness.   Hematological: Negative.    Psychiatric/Behavioral: Negative.         PAST MEDICAL HISTORY  Past Medical History:   Diagnosis Date    Ankylosing spondylitis of site in spine (Multi) May2023    Anxiety 2022    AS (ankylosing spondylitis) (Multi)     Chronic constipation 1980    Chronic diarrhea 2024    Chronic pain disorder Long time    Headache Entire life    Low back pain In my 20s    Migraine Entire life    Neck pain In my20s    Osteoarthritis May2023    Rheumatoid arthritis      Past Surgical History:   Procedure Laterality Date    COLONOSCOPY      w endoscopy    DILATION AND CURETTAGE OF UTERUS      EPIDURAL BLOCK INJECTION  Multiple    RADIOFREQUENCY ABLATION  06/2024    lumbar spine      Family History   Problem Relation Name Age of Onset    Thyroid disease Mother      Colon cancer Father Mynor     Thyroid disease Sister      Cerebral palsy Brother          1/2 brother    Stomach cancer Maternal Grandmother Grandma kitalida     Arthritis Paternal Grandmother Shaniqua     Breast cancer Other AUNT     Heart attack Other UNCLE        CURRENT MEDICATIONS  Current Outpatient Medications   Medication Sig Dispense Refill    cyanocobalamin (Vitamin B-12) 100 mcg tablet Take by mouth. As directed      DULoxetine (Cymbalta) 60 mg DR capsule Take 1 capsule (60 mg) by mouth 2 times a day. 90 capsule 1    HYDROcodone-acetaminophen (Norco)  mg tablet Take 1 tablet by mouth 4 times a day as needed for severe pain (7 - 10). Do not fill before March 22, 2025. 120 tablet 0    iron,carb/vit C/vit B12/folic (IRON 100 PLUS ORAL) Take by mouth.      naloxone (Narcan) 4 mg/0.1 mL nasal spray Administer 1 spray (4 mg) into affected nostril(s) if needed for opioid reversal or respiratory depression.      omeprazole (PriLOSEC) 40 mg DR capsule Take by mouth.      traZODone (Desyrel) 100 mg tablet Take 1 tablet (100 mg) by mouth as needed at bedtime for sleep. 90 tablet 1    methocarbamol (Robaxin) 750 mg tablet Take 1 tablet (750 mg) by mouth 3 times a day. 90 tablet 0     No current facility-administered medications for this visit.       ALLERGIES AND DRUG REACTIONS  No Known Allergies       OBJECTIVE  Visit Vitals  /60   Pulse 77   SpO2 98%   OB Status Perimenopausal   Smoking Status Former       Last Recorded Pain Score (if available):         Pain Score:   8       Physical Exam  Vitals and nursing note reviewed.     General: Sitting in chair, NAD  Head: NCAT  Eyes: Sclera/conjunctiva clear, EOMI, PERRL  Nose/mouth: MMM  CV: palpable radial pulse RRR  Lungs: Good/equal chest excursion  Abdomen: Soft, ND  Ext: No cyanosis/edema  MSK: c-spine: anterior tilt, BL paraspinal m TTP, neg Spurling/Lhermitte, pain with  "turning    Neuro: AAOx3, CN grossly nl  Dermatome sensation to light touch  LEFT C5: WNL    RIGHT C5: WNL      LEFT C6: WNL       RIGHT C6: WNL      LEFT C7: WNL       RIGHT C7: WNL      LEFT C8: WNL       RIGHT C8: WNL      LEFT T1: WNL       RIGHT T1: WNL    Motor strength  LEFT C5 (elbow flexion): 5/5   RIGHT C5: 5/5  LEFT C6 (wrist extension): 5/5     RIGHT C6: 5/5  LEFT C7 (elbow extension): 5/5     RIGHT C7: 5/5  LEFT C8 (finger abduction): 5/5     RIGHT C8: 5/5  LEFT T1 (hand ): 5/5     RIGHT T1: 5/5    Special testing  [place]: neg BL        Psych: affect nl  Skin: no rash/lesions      REVIEW OF LABORATORY DATA  I have reviewed the following lab results:  WBC   Date Value Ref Range Status   11/04/2024 7.2 4.4 - 11.3 x10*3/uL Final     RBC   Date Value Ref Range Status   11/04/2024 4.56 4.00 - 5.20 x10*6/uL Final     Hemoglobin   Date Value Ref Range Status   11/04/2024 13.7 12.0 - 16.0 g/dL Final     Hematocrit   Date Value Ref Range Status   11/04/2024 40.5 36.0 - 46.0 % Final     MCV   Date Value Ref Range Status   11/04/2024 89 80 - 100 fL Final     MCH   Date Value Ref Range Status   11/04/2024 30.0 26.0 - 34.0 pg Final     MCHC   Date Value Ref Range Status   11/04/2024 33.8 32.0 - 36.0 g/dL Final     RDW   Date Value Ref Range Status   11/04/2024 12.8 11.5 - 14.5 % Final     Platelets   Date Value Ref Range Status   11/04/2024 318 150 - 450 x10*3/uL Final     MPV   Date Value Ref Range Status   09/07/2021 11.0 7.0 - 12.6 CU Final     Sodium   Date Value Ref Range Status   11/04/2024 135 (L) 136 - 145 mmol/L Final     Potassium   Date Value Ref Range Status   11/04/2024 3.8 3.5 - 5.3 mmol/L Final     Bicarbonate   Date Value Ref Range Status   11/04/2024 27 21 - 32 mmol/L Final     Urea Nitrogen   Date Value Ref Range Status   11/04/2024 4 (L) 6 - 23 mg/dL Final     Calcium   Date Value Ref Range Status   11/04/2024 8.8 8.6 - 10.3 mg/dL Final     No results found for: \"PROTIME\", \"PTT\", \"INR\", " "\"FIBRINOGEN\"      REVIEW OF RADIOLOGY   I have reviewed the following:  Radiology Studies           MRI c-spine 8/30/23:  There is loss of the normal cervical lordosis. No acute fracture is  identified. There is grade 1 anterolisthesis C4 on C5. There are posterior  osteophytes extending from the C3-C6 levels. No  STIR abnormalities are seen  within the marrow.     The visualized cord signal is grossly unremarkable.     C2-C3: No disc herniation. No significant canal or foraminal stenosis.  C3-C4: There is a posterior disc osteophyte complex. No significant canal or  foraminal stenosis.  C4-C5: There is a posterior disc osteophyte complex. No significant canal or  foraminal stenosis.  C5-C6: There is a posterior disc osteophyte complex. There is facet  osteoarthropathy. No significant canal stenosis. Mild right neural foraminal  narrowing.  C6-C7: There is a posterior disc osteophyte complex. There is facet  osteoarthropathy. No significant canal stenosis. Mild right neural foraminal  narrowing.  C7-T1: No disc herniation. No significant canal or foraminal stenosis.     The paravertebral soft tissues are unremarkable.     IMPRESSION:  Mild multilevel cervical spondylosis most severe at C5-C6 and C6-C7  minimally progressed as compared to the prior examination from 2014.            ASSESSMENT & PLAN  Meghan Hernández is a 49 y.o. female with PMH MDD, RA, ankylosing spondylitis who presents for F/U     1) Neck pain   -Neck pain since 2021 radiating to right upper extremity with subjective fourth and fifth digit numbness, tingling, weakness. Cervical spondylitis with radic. Possible contribution from rheumatoid arthritis, ankylosing spondylitis  -Refractive to Tylenol, NSAIDs, muscle relaxants, Cymbalta, topical lidocaine, >6 w PT  -MRI C-spine 8/30/2023: Multilevel spondylosis featuring grade 1 C4-5 listhesis, severe facet arthropathy at C5-6 and C6-7 with right neuroforaminal stenosis  -C7-T1 GINA 10/17/24: 100% relief " BUE, 25% relief neck. Residual neck pain remains bothersome  -L C5-6 MBNB #1 3/6/25: 85% relief L-sided axial neck pain  -Schedule repeat L C5-6 MBNB #2 with LA only to assess candidacy for RFA  -Consider repeat TIM after SIJ CSI    2) LBP  -Originally LBP since ~2008 with right leg pain, numbness, weakness- possible contribution from ankylosing spondylitis. BLE obj weakness now resolved.  -Refractive to Tylenol, NSAIDs, muscle relaxants, Cymbalta, topical lidocaine, chiropractics, >6 w PT.   -Reviewed/discussed MRI L-spine 10/15/24: multilevel spondylosis featuring mod R>L NFS at L3-4  -BL LMBN RFA 7/11/24: 60% ongoing relief    3) Sacroiliitis  -R-sided LBP reproduced on numerous R SIJ-provocative maneuvers on exam  -R SIJ CSI 1/25/24: 50% relief 8 mo  -R SIJ CSI 12/19/24: 50% relief 1 mo  -Consider repeat vs dx injxn after RA optimized    4) Fibromyalgia  -Diffuse whole body pain for yrs on hx RA and ankylosing spondylitis on duloxetine  -Refractive to Tylenol, NSAIDs, muscle relaxants, Cymbalta, topical lidocaine, chiropractics, >6 w PT, gabapentin, pregabalin  -Cont methocarbamol 750 mg QID PRN  -F/U rheum    5) opioid mgmt  2/2 chronic neck and LBP refractive to conservative, interventional tx  -Tramadol, [place] providing insufficient relief  - reviewed/appropriate. Reviewed UDS 12/2/24: OK. No sign of aberrant behavior.   -Rotate to Percocet 10 mg QID PRN (60 MME) x30 d        Discussed procedure risks/benefits in detail with patient. Pt meets medical necessity for procedure due to failure of conservative measures. Reviewed procedural risks including bleeding, infection, nerve damage, paralysis. Also reviewed mitigating factors such as screening for infection/blood thinner use, sterile precautions, and image-guidance when applicable. All questions answered. Pt/guardian expressed understanding and choose to proceed    Today's visit involved continuation of chronic pain care. In the context of the complexity  of this patient's chronic pain diagnosis, long-term expectations and care planning discussed. Adequate time taken to ensure patient understanding and answer questions. Imaging studies ordered are placed do elucidate the patient's diagnosis, but also to evaluate the patient's candidacy for procedural and surgical interventions. The risks and benefits of these potential interventions are detailed as above.                   Sofiya Mandujano MD  Anesthesiologist & Interventional Pain Physician   [facility] [place]  O: [phone]  F: [phone]  4:24 PM  04/09/25

## 2025-04-09 NOTE — H&P (VIEW-ONLY)
PAIN MANAGEMENT FOLLOW-UP OFFICE NOTE    Date of Service: 4/9/2025    SUBJECTIVE    CHIEF COMPLAINT: LB pain    HISTORY OF PRESENT ILLNESS    Meghan Hernández is a 49 y.o. female with PMH MDD, RA, ankylosing spondylitis who presents for follow-up LB pain.     On 3/6, pt underwent L C5-6 MBNB with 85% relief 4 h. She is looking forward to repeat. Seeing rheum and awaiting to start Rinvoq.     Pt denies new-onset numbness, weakness, bowel/bladder incontinence.  Pt denies recent infection, allergy to Latex/iodine/contrast. Patient is currently taking the following blood thinner(s): N/A    Procedure Log:  -L C5-6 MBNB #1 3/6/25: 85% relief   -R SIJ CSI 12/19/24: 50% relief 1 mo  -C7-T1 GINA 10/17/24: 100% relief BUE, 25% relief neck  -BL LMBN RFA 7/11/24: 60% ongoing  -R SIJ CSI 1/25/24: 50% relief 8 mo  -C7-T1 GINA 12/28/23: 80% relief 8 mo  -C7-T1 GINA 10/26/23: 100% relief RUE, 40% neck      REVIEW OF SYSTEMS  Review of Systems   Constitutional: Negative.    HENT: Negative.     Eyes: Negative.    Respiratory: Negative.     Cardiovascular: Negative.    Gastrointestinal: Negative.    Endocrine: Negative.    Musculoskeletal:  Positive for back pain and neck pain.   Skin: Negative.    Neurological:  Positive for weakness and numbness.   Hematological: Negative.    Psychiatric/Behavioral: Negative.         PAST MEDICAL HISTORY  Past Medical History:   Diagnosis Date    Ankylosing spondylitis of site in spine (Multi) May2023    Anxiety 2022    AS (ankylosing spondylitis) (Multi)     Chronic constipation 1980    Chronic diarrhea 2024    Chronic pain disorder Long time    Headache Entire life    Low back pain In my 20s    Migraine Entire life    Neck pain In my20s    Osteoarthritis May2023    Rheumatoid arthritis      Past Surgical History:   Procedure Laterality Date    COLONOSCOPY      w endoscopy    DILATION AND CURETTAGE OF UTERUS      EPIDURAL BLOCK INJECTION  Multiple    RADIOFREQUENCY ABLATION  06/2024    lumbar spine      Family History   Problem Relation Name Age of Onset    Thyroid disease Mother      Colon cancer Father Mynor     Thyroid disease Sister      Cerebral palsy Brother          1/2 brother    Stomach cancer Maternal Grandmother Grandma kitalida     Arthritis Paternal Grandmother Shaniqua     Breast cancer Other AUNT     Heart attack Other UNCLE        CURRENT MEDICATIONS  Current Outpatient Medications   Medication Sig Dispense Refill    cyanocobalamin (Vitamin B-12) 100 mcg tablet Take by mouth. As directed      DULoxetine (Cymbalta) 60 mg DR capsule Take 1 capsule (60 mg) by mouth 2 times a day. 90 capsule 1    HYDROcodone-acetaminophen (Norco)  mg tablet Take 1 tablet by mouth 4 times a day as needed for severe pain (7 - 10). Do not fill before March 22, 2025. 120 tablet 0    iron,carb/vit C/vit B12/folic (IRON 100 PLUS ORAL) Take by mouth.      naloxone (Narcan) 4 mg/0.1 mL nasal spray Administer 1 spray (4 mg) into affected nostril(s) if needed for opioid reversal or respiratory depression.      omeprazole (PriLOSEC) 40 mg DR capsule Take by mouth.      traZODone (Desyrel) 100 mg tablet Take 1 tablet (100 mg) by mouth as needed at bedtime for sleep. 90 tablet 1    methocarbamol (Robaxin) 750 mg tablet Take 1 tablet (750 mg) by mouth 3 times a day. 90 tablet 0     No current facility-administered medications for this visit.       ALLERGIES AND DRUG REACTIONS  No Known Allergies       OBJECTIVE  Visit Vitals  /60   Pulse 77   SpO2 98%   OB Status Perimenopausal   Smoking Status Former       Last Recorded Pain Score (if available):         Pain Score:   8       Physical Exam  Vitals and nursing note reviewed.     General: Sitting in chair, NAD  Head: NCAT  Eyes: Sclera/conjunctiva clear, EOMI, PERRL  Nose/mouth: MMM  CV: palpable radial pulse RRR  Lungs: Good/equal chest excursion  Abdomen: Soft, ND  Ext: No cyanosis/edema  MSK: c-spine: anterior tilt, BL paraspinal m TTP, neg Spurling/Lhermitte, pain with  "turning    Neuro: AAOx3, CN grossly nl  Dermatome sensation to light touch  LEFT C5: WNL    RIGHT C5: WNL      LEFT C6: WNL       RIGHT C6: WNL      LEFT C7: WNL       RIGHT C7: WNL      LEFT C8: WNL       RIGHT C8: WNL      LEFT T1: WNL       RIGHT T1: WNL    Motor strength  LEFT C5 (elbow flexion): 5/5   RIGHT C5: 5/5  LEFT C6 (wrist extension): 5/5     RIGHT C6: 5/5  LEFT C7 (elbow extension): 5/5     RIGHT C7: 5/5  LEFT C8 (finger abduction): 5/5     RIGHT C8: 5/5  LEFT T1 (hand ): 5/5     RIGHT T1: 5/5    Special testing  Forde: neg BL        Psych: affect nl  Skin: no rash/lesions      REVIEW OF LABORATORY DATA  I have reviewed the following lab results:  WBC   Date Value Ref Range Status   11/04/2024 7.2 4.4 - 11.3 x10*3/uL Final     RBC   Date Value Ref Range Status   11/04/2024 4.56 4.00 - 5.20 x10*6/uL Final     Hemoglobin   Date Value Ref Range Status   11/04/2024 13.7 12.0 - 16.0 g/dL Final     Hematocrit   Date Value Ref Range Status   11/04/2024 40.5 36.0 - 46.0 % Final     MCV   Date Value Ref Range Status   11/04/2024 89 80 - 100 fL Final     MCH   Date Value Ref Range Status   11/04/2024 30.0 26.0 - 34.0 pg Final     MCHC   Date Value Ref Range Status   11/04/2024 33.8 32.0 - 36.0 g/dL Final     RDW   Date Value Ref Range Status   11/04/2024 12.8 11.5 - 14.5 % Final     Platelets   Date Value Ref Range Status   11/04/2024 318 150 - 450 x10*3/uL Final     MPV   Date Value Ref Range Status   09/07/2021 11.0 7.0 - 12.6 CU Final     Sodium   Date Value Ref Range Status   11/04/2024 135 (L) 136 - 145 mmol/L Final     Potassium   Date Value Ref Range Status   11/04/2024 3.8 3.5 - 5.3 mmol/L Final     Bicarbonate   Date Value Ref Range Status   11/04/2024 27 21 - 32 mmol/L Final     Urea Nitrogen   Date Value Ref Range Status   11/04/2024 4 (L) 6 - 23 mg/dL Final     Calcium   Date Value Ref Range Status   11/04/2024 8.8 8.6 - 10.3 mg/dL Final     No results found for: \"PROTIME\", \"PTT\", \"INR\", " "\"FIBRINOGEN\"      REVIEW OF RADIOLOGY   I have reviewed the following:  Radiology Studies           MRI c-spine 8/30/23:  There is loss of the normal cervical lordosis. No acute fracture is  identified. There is grade 1 anterolisthesis C4 on C5. There are posterior  osteophytes extending from the C3-C6 levels. No  STIR abnormalities are seen  within the marrow.     The visualized cord signal is grossly unremarkable.     C2-C3: No disc herniation. No significant canal or foraminal stenosis.  C3-C4: There is a posterior disc osteophyte complex. No significant canal or  foraminal stenosis.  C4-C5: There is a posterior disc osteophyte complex. No significant canal or  foraminal stenosis.  C5-C6: There is a posterior disc osteophyte complex. There is facet  osteoarthropathy. No significant canal stenosis. Mild right neural foraminal  narrowing.  C6-C7: There is a posterior disc osteophyte complex. There is facet  osteoarthropathy. No significant canal stenosis. Mild right neural foraminal  narrowing.  C7-T1: No disc herniation. No significant canal or foraminal stenosis.     The paravertebral soft tissues are unremarkable.     IMPRESSION:  Mild multilevel cervical spondylosis most severe at C5-C6 and C6-C7  minimally progressed as compared to the prior examination from 2014.            ASSESSMENT & PLAN  Meghan Hernández is a 49 y.o. female with PMH MDD, RA, ankylosing spondylitis who presents for F/U     1) Neck pain   -Neck pain since 2021 radiating to right upper extremity with subjective fourth and fifth digit numbness, tingling, weakness. Cervical spondylitis with radic. Possible contribution from rheumatoid arthritis, ankylosing spondylitis  -Refractive to Tylenol, NSAIDs, muscle relaxants, Cymbalta, topical lidocaine, >6 w PT  -MRI C-spine 8/30/2023: Multilevel spondylosis featuring grade 1 C4-5 listhesis, severe facet arthropathy at C5-6 and C6-7 with right neuroforaminal stenosis  -C7-T1 GINA 10/17/24: 100% relief " BUE, 25% relief neck. Residual neck pain remains bothersome  -L C5-6 MBNB #1 3/6/25: 85% relief L-sided axial neck pain  -Schedule repeat L C5-6 MBNB #2 with LA only to assess candidacy for RFA  -Consider repeat TIM after SIJ CSI    2) LBP  -Originally LBP since ~2008 with right leg pain, numbness, weakness- possible contribution from ankylosing spondylitis. BLE obj weakness now resolved.  -Refractive to Tylenol, NSAIDs, muscle relaxants, Cymbalta, topical lidocaine, chiropractics, >6 w PT.   -Reviewed/discussed MRI L-spine 10/15/24: multilevel spondylosis featuring mod R>L NFS at L3-4  -BL LMBN RFA 7/11/24: 60% ongoing relief    3) Sacroiliitis  -R-sided LBP reproduced on numerous R SIJ-provocative maneuvers on exam  -R SIJ CSI 1/25/24: 50% relief 8 mo  -R SIJ CSI 12/19/24: 50% relief 1 mo  -Consider repeat vs dx injxn after RA optimized    4) Fibromyalgia  -Diffuse whole body pain for yrs on hx RA and ankylosing spondylitis on duloxetine  -Refractive to Tylenol, NSAIDs, muscle relaxants, Cymbalta, topical lidocaine, chiropractics, >6 w PT, gabapentin, pregabalin  -Cont methocarbamol 750 mg QID PRN  -F/U rheum    5) opioid mgmt  2/2 chronic neck and LBP refractive to conservative, interventional tx  -Tramadol, [place] providing insufficient relief  - reviewed/appropriate. Reviewed UDS 12/2/24: OK. No sign of aberrant behavior.   -Rotate to Percocet 10 mg QID PRN (60 MME) x30 d        Discussed procedure risks/benefits in detail with patient. Pt meets medical necessity for procedure due to failure of conservative measures. Reviewed procedural risks including bleeding, infection, nerve damage, paralysis. Also reviewed mitigating factors such as screening for infection/blood thinner use, sterile precautions, and image-guidance when applicable. All questions answered. Pt/guardian expressed understanding and choose to proceed    Today's visit involved continuation of chronic pain care. In the context of the complexity  of this patient's chronic pain diagnosis, long-term expectations and care planning discussed. Adequate time taken to ensure patient understanding and answer questions. Imaging studies ordered are placed do elucidate the patient's diagnosis, but also to evaluate the patient's candidacy for procedural and surgical interventions. The risks and benefits of these potential interventions are detailed as above.                   Sofiya Mandujano MD  Anesthesiologist & Interventional Pain Physician   [facility]  O: [phone]  F: [phone]  4:24 PM  04/09/25

## 2025-04-09 NOTE — PROGRESS NOTES
MEDICATION NAME: NORCO  STRENGTH:  MG  LAST FILL DATE: 2025  DATE LAST TAKEN:   QUANTITY FILLED: 120  QUANTITY REMAININ  COUNT COMPLETED BY: ROMA DIAZ MA and PITER SHEPHERD LPN

## 2025-04-11 PROBLEM — Z79.891 LONG TERM (CURRENT) USE OF OPIATE ANALGESIC: Status: ACTIVE | Noted: 2025-04-11

## 2025-05-01 ENCOUNTER — HOSPITAL ENCOUNTER (OUTPATIENT)
Dept: GASTROENTEROLOGY | Facility: HOSPITAL | Age: 50
Discharge: HOME | End: 2025-05-01
Payer: COMMERCIAL

## 2025-05-01 VITALS
HEART RATE: 73 BPM | HEIGHT: 60 IN | SYSTOLIC BLOOD PRESSURE: 124 MMHG | WEIGHT: 131 LBS | TEMPERATURE: 97.3 F | DIASTOLIC BLOOD PRESSURE: 89 MMHG | RESPIRATION RATE: 18 BRPM | BODY MASS INDEX: 25.72 KG/M2 | OXYGEN SATURATION: 98 %

## 2025-05-01 DIAGNOSIS — M47.812 CERVICAL SPONDYLOSIS: ICD-10-CM

## 2025-05-01 PROCEDURE — 64490 INJ PARAVERT F JNT C/T 1 LEV: CPT | Performed by: ANESTHESIOLOGY

## 2025-05-01 PROCEDURE — 2500000004 HC RX 250 GENERAL PHARMACY W/ HCPCS (ALT 636 FOR OP/ED): Performed by: ANESTHESIOLOGY

## 2025-05-01 RX ORDER — UPADACITINIB 15 MG/1
TABLET, EXTENDED RELEASE ORAL
COMMUNITY
Start: 2025-04-16

## 2025-05-01 RX ORDER — BUPIVACAINE HYDROCHLORIDE 5 MG/ML
INJECTION, SOLUTION PERINEURAL AS NEEDED
Status: COMPLETED | OUTPATIENT
Start: 2025-05-01 | End: 2025-05-01

## 2025-05-01 RX ADMIN — BUPIVACAINE HYDROCHLORIDE 1 ML: 5 INJECTION, SOLUTION PERINEURAL at 09:42

## 2025-05-01 ASSESSMENT — PAIN SCALES - GENERAL
PAINLEVEL_OUTOF10: 5 - MODERATE PAIN
PAINLEVEL_OUTOF10: 7

## 2025-05-01 ASSESSMENT — PAIN - FUNCTIONAL ASSESSMENT
PAIN_FUNCTIONAL_ASSESSMENT: 0-10
PAIN_FUNCTIONAL_ASSESSMENT: 0-10

## 2025-05-01 ASSESSMENT — PAIN DESCRIPTION - DESCRIPTORS: DESCRIPTORS: THROBBING

## 2025-05-01 NOTE — INTERVAL H&P NOTE
H&P reviewed. The patient was examined and there are no changes to the H&P. Meghan Hernández is a 49 y.o. female with PMH MDD, RA, ankylosing spondylitis who presents for L C5-6 MBNB #2 with LA only to assess candidacy for RFA. Patient's pain stable and persistent from last visit.  Denies allergies to Latex, steroids, local anesthetics, or iodine/contrast. Denies being on blood thinners. Not diabetic.  Denies fever, chills, NS, CP, SOB, cough, N/V.    Discussed procedure risks/benefits in detail with patient. Pt meets medical necessity for procedure due to failure of conservative measures. Reviewed procedural risks including bleeding, infection, nerve damage, paralysis. Also reviewed mitigating factors such as screening for infection/blood thinner use, sterile precautions, and image-guidance when applicable. All questions answered. Pt/guardian expressed understanding and choose to proceed      Sofiya Mandujano MD  Anesthesiologist & Interventional Pain Physician   [facility]  O: [phone]  F: [phone]  9:07 AM  05/01/25

## 2025-05-01 NOTE — DISCHARGE INSTRUCTIONS
DISCHARGE INSTRUCTIONS FOR INJECTIONS     You underwent left cervical medial branch blocks today    Aftermost injections, it is recommended that you relax and limit your activity for the remainder of the day unless you have been told otherwise by your pain physician.  You should not drive a car, operate machinery, or make important legal decisions unless otherwise directed by your pain physician.  You may resume your normal activity, including exercise, tomorrow.      Keep a written pain diary of how much pain relief you experienced following the injection procedure and the length of time of pain relief you experienced pain relief. Following diagnostic injections like medial branch nerve blocks, sacroiliac joint blocks, stellate ganglion injections and other blocks, it is very important you record the specific amount of pain relief you experienced immediately after the injectionand how long it lasted. Your doctor will ask you for this information at your follow up visit.     For all injections, please keep the injection site dry and inspect the site for a couple of days. You may remove the Band-Aid the day of the injection at any time.     Some discomfort, bruising or slight swelling may occur at the injection site. This is not abnormal if it occurs.  If needed you may:    -Take over the counter medication such as Tylenol or Motrin.   -Apply an ice pack for 30 minutes, 2 to 3 times a day for the first 24 hours.     You may shower today; no soaking baths, hot tubs, whirlpools or swimming pools for two days.      If you are given steroids in your injection, it may take 3-5 days for the steroid medication to take effect. You may notice a worsening of your symptoms for 1-2 days after the injection. This is not abnormal.  You may use acetaminophen, ibuprofen, or prescription medication that your doctor may have prescribed for you if you need to do so.     A few common side effects of steroids include facial flushing,  sweating, restlessness, irritability,difficulty sleeping, increase in blood sugar, and increased blood pressure. If you have diabetes, please monitor your blood sugar at least once a day for at least 5 days. If you have poorly controlled high blood pressure, monitoryour blood pressure for at least 2 days and contact your primary care physician if these numbers are unusually high for you.      If you take aspirin or non-steroidal anti-inflammatory drugs (examples are Motrin, Advil, ibuprofen, Naprosyn, Voltaren, Relafen, etc.) you may restart these this evening, but stop taking it 3 days before your next appointment, unless instructed otherwiseby your physician.      You do not need to discontinue non-aspirin-containing pain medications prior to an injection (examples: Celebrex, tramadol, hydrocodone and acetaminophen).      If you take a blood thinning medication (Coumadin, Lovenox, Fragmin,Ticlid, Plavix, Pradaxa, etc.), please discuss this with your primary care physician/cardiologist and your pain physician. These medications MUST be discontinued before you can have an injection safely, without the risk of uncontrolled bleeding. If these medications are not discontinued for an appropriate period of time, you will not be able to receivean injection.      If you are taking Coumadin, please have your INR checked the morning of your procedure and bringthe result to your appointment unless otherwise instructed. If your INR is over 1.2, your injection may need to be rescheduled to avoid uncontrolled bleeding from the needle placement.     Call [facility] at [phone] between 8am-4pm Monday - Friday if you are experiencing the following:    If you received an epidural or spinal injection:    -Headache that doesnot go away with medicine, is worse when sitting or standing up, and is greatly relieved upon lying down.   -Severe pain worse than or different than your baseline pain.   -Chills or fever  (101º F or greater).   -Drainage or signs of infection at the injection site     Go directly to the Emergency Department if you are experiencing the following and received an epidural or spinal injection:   -Abrupt weakness or progressive weakness in your legs that starts after you leave the clinic.   -Abrupt severe or worsening numbness in your legs.   -Inability to urinate after the injection or loss of bowel or bladder control without the urge to defecate or urinate.     If you have a clinical question that cannot wait until your next appointment, please call [phone] between 8am-4pm Monday - Friday or send a [employer] message. We do our best to return all non-emergency messages within 24 hours, Monday - Friday. A nurse or physician will return your message.      If you need to cancel an appointment, please call the scheduling staff at [phone] during normal business hours or leave a message at least 24 hours in advance.     If you are going to be sedated for your next procedure, you MUST have responsible adult who can legally drive accompany you home. You cannot eat or drink for eight hours prior to the planned procedure if you are going to receive sedation. You may take your non-blood thinning medications with a small sip of water.

## 2025-05-12 DIAGNOSIS — F32.A DEPRESSION, UNSPECIFIED DEPRESSION TYPE: ICD-10-CM

## 2025-05-13 RX ORDER — TRAZODONE HYDROCHLORIDE 100 MG/1
100 TABLET ORAL NIGHTLY PRN
Qty: 90 TABLET | Refills: 1 | Status: SHIPPED | OUTPATIENT
Start: 2025-05-13

## 2025-05-13 NOTE — TELEPHONE ENCOUNTER
Prescription request received and populated   Pharmacy populated  Last Office Visit: 11/04/24  HAS UPCOMING OV 5/29/25    Does this rx need to wait for refill until upcoming appmt?

## 2025-05-15 ENCOUNTER — OFFICE VISIT (OUTPATIENT)
Dept: PAIN MEDICINE | Facility: CLINIC | Age: 50
End: 2025-05-15
Payer: COMMERCIAL

## 2025-05-15 VITALS
HEIGHT: 60 IN | DIASTOLIC BLOOD PRESSURE: 80 MMHG | HEART RATE: 74 BPM | BODY MASS INDEX: 25.72 KG/M2 | SYSTOLIC BLOOD PRESSURE: 123 MMHG | WEIGHT: 131 LBS | OXYGEN SATURATION: 99 %

## 2025-05-15 DIAGNOSIS — M47.812 CERVICAL SPONDYLOSIS: ICD-10-CM

## 2025-05-15 DIAGNOSIS — M45.0 ANKYLOSING SPONDYLITIS OF MULTIPLE SITES IN SPINE (MULTI): ICD-10-CM

## 2025-05-15 DIAGNOSIS — M54.12 RADICULOPATHY, CERVICAL REGION: ICD-10-CM

## 2025-05-15 DIAGNOSIS — M46.1 SACROILIITIS: Primary | ICD-10-CM

## 2025-05-15 DIAGNOSIS — M47.817 LUMBOSACRAL SPONDYLOSIS WITHOUT MYELOPATHY: ICD-10-CM

## 2025-05-15 PROCEDURE — 3008F BODY MASS INDEX DOCD: CPT | Performed by: PHYSICIAN ASSISTANT

## 2025-05-15 PROCEDURE — 99214 OFFICE O/P EST MOD 30 MIN: CPT | Performed by: PHYSICIAN ASSISTANT

## 2025-05-15 RX ORDER — OXYCODONE AND ACETAMINOPHEN 7.5; 325 MG/1; MG/1
1 TABLET ORAL EVERY 6 HOURS PRN
Qty: 120 TABLET | Refills: 0 | Status: SHIPPED | OUTPATIENT
Start: 2025-05-21 | End: 2025-06-20

## 2025-05-15 RX ORDER — METHYLPREDNISOLONE 4 MG/1
TABLET ORAL
Qty: 21 TABLET | Refills: 0 | Status: SHIPPED | OUTPATIENT
Start: 2025-05-15 | End: 2025-05-21

## 2025-05-15 ASSESSMENT — PAIN DESCRIPTION - DESCRIPTORS: DESCRIPTORS: ACHING;THROBBING

## 2025-05-15 ASSESSMENT — PAIN - FUNCTIONAL ASSESSMENT: PAIN_FUNCTIONAL_ASSESSMENT: 0-10

## 2025-05-15 ASSESSMENT — PAIN SCALES - GENERAL
PAINLEVEL_OUTOF10: 4
PAINLEVEL_OUTOF10: 4

## 2025-05-15 NOTE — PROGRESS NOTES
"Chronic Pain Clinic Follow-Up Evaluation    Date: May 15, 2025 - 2:34 PM    Chief Complaint: No chief complaint on file.      SUBJECTIVE:    Meghan Hernández is a 49 y.o. female who presents to [facility] for a follow up appointment for ***.    Patient is an established patient of Dr Mandujano     Patient has hx of:     PMH also significant for:      The plan from the last visit on *** was:    Cervicalfacet mnbb 75% relief 5/1- 2NND      4/10 pain today   LEFT SIDED,WILL PLAN FOR rfa   Low back is problemATIIC     Ruight  SI jiont irritated today   Kidney function normal  - lab reviewed  New on renvoq  Has started percoet was nor has noticed nightmares  - had this before       Since the last visit, ***     Duration of pain:      The pain is located      Pain Description      Aggravating Factors:      Alleviating Factors:       Previous pain treatments:     Physical Therapy:        Current Outpatient Medications:  Current Medications[1]    Current Anticoagulant Therapy: {NEGATIVE/POSITIVE:55214}    OARRS: {OARRS:66833}    Pain medications reviewed:  {YES/NO:63}    Past Medical History:  Medical History[2]    Past Surgical History:  Surgical History[3]    Family History:  Family History[4]    Social History:  Social History     Substance and Sexual Activity   Alcohol Use Not Currently     Tobacco Use History[5]  Social History     Substance and Sexual Activity   Drug Use Never         Review of Systems     Physical Examination:  Vitals:    05/15/25 1412   BP: 123/80   Pulse: 74   SpO2: 99%     General:in no acute distress  Skin: skin color, texture, turgor normal, no rashes or lesions  HEENT: normocephalic, atraumatic, sclera non-icteric   Resp: unlabored on room air   Musculoskeletal:  Neck: {PM NECK:54613::\"Supple; good ROM.\"}  Back: {[facility] BACK:01315::\"No pain on palpation of the lumbar spine. Full ROM without reproducible pain.  Straight leg raising test negative bilaterally.  \"}  Extremities: " "{EXTREMITY EXAM:3013::\"Extremities normal. No deformities, edema, or skin discoloration\"}  Neurological:  Mental Status: alert and oriented  Gait: {[facility] GAIT:27491}    New Imaging and Diagnostic Studies:  ***    ASSESSMENT:    Meghan Hernández is a 49 y.o. female with ***. Since last visit, the pain has ***.    {Assess/Plan SmartLinks (Optional):80070}    PLAN:    Diagnostics:      Physical Therapy and Rehabilitation:     Psychologically:    Medication: Continue current medication regimen. See counseling note below. Prescription for *** sent to the pharmacy   Urine tox last collected - 12/2024  Opioid agreement signed -   Pill count     Duration:     Intervention: None needed at this time.     7.   Follow up:    The patient continues to see benefit and improvement in their quality of life and ability to maintain ADLs.  Patient educated about the risks of taking opioids and operating a motor vehicle.  Patient reports no adverse side effects to current medication regimen.  Current regimen does allow patient to maintain ADLs.      Patient has been educated on the risks, benefits, and alternatives of controlled substances as well as the proper way to store these medications.  The patient and I discussed the nature of this medication and its side effects.  We discussed tolerance, physical dependence, psychological dependence, addiction and opioid-induced hyperalgesia.   We discussed the fact that the patient should not drive an automobile or operate heavy machinery while taking this medication.     The above plan and management options were discussed at length with patient. Patient is in agreement with the above and verbalized understanding. It will be communicated with the referring physician via electronic record, fax, or mail.    Samantha Harris PA-C  May 15, 2025           [1]  Current Outpatient Medications   Medication Sig Dispense Refill   • cyanocobalamin (Vitamin B-12) 100 mcg tablet Take by mouth. As directed "     • DULoxetine (Cymbalta) 60 mg DR capsule Take 1 capsule (60 mg) by mouth 2 times a day. 90 capsule 1   • iron,carb/vit C/vit B12/folic (IRON 100 PLUS ORAL) Take by mouth.     • methocarbamol (Robaxin) 750 mg tablet Take 1 tablet (750 mg) by mouth 3 times a day as needed for muscle spasms. 90 tablet 2   • naloxone (Narcan) 4 mg/0.1 mL nasal spray Administer 1 spray (4 mg) into affected nostril(s) if needed for opioid reversal or respiratory depression.     • omeprazole (PriLOSEC) 40 mg DR capsule Take by mouth.     • oxyCODONE-acetaminophen (Percocet) 7.5-325 mg tablet Take 1 tablet by mouth every 6 hours if needed for severe pain (7 - 10). Do not fill before April 21, 2025. 120 tablet 0   • Rinvoq 15 mg tablet extended release 24 hr      • traZODone (Desyrel) 100 mg tablet Take 1 tablet (100 mg) by mouth as needed at bedtime for sleep. 90 tablet 1     No current facility-administered medications for this visit.   [2]  Past Medical History:  Diagnosis Date   • Ankylosing spondylitis of site in spine (Multi) May2023   • Anxiety 2022   • AS (ankylosing spondylitis) (Multi)    • Chronic constipation 1980   • Chronic diarrhea 2024   • Chronic pain disorder Long time   • Headache Entire life   • Low back pain In my 20s   • Migraine Entire life   • Neck pain In my20s   • Osteoarthritis May2023   • Rheumatoid arthritis    [3]  Past Surgical History:  Procedure Laterality Date   • COLONOSCOPY      w endoscopy   • DILATION AND CURETTAGE OF UTERUS     • EPIDURAL BLOCK INJECTION  Multiple   • RADIOFREQUENCY ABLATION  06/2024    lumbar spine   [4]  Family History  Problem Relation Name Age of Onset   • Thyroid disease Mother     • Colon cancer Father Mynor    • Thyroid disease Sister     • Cerebral palsy Brother          1/2 brother   • Stomach cancer Maternal Grandmother Grandma emma    • Arthritis Paternal Grandmother Shaniqua    • Breast cancer Other AUNT    • Heart attack Other UNCLE    [5]  Social History  Tobacco Use    Smoking Status Former   • Current packs/day: 0.00   • Average packs/day: 1 pack/day for 30.0 years (30.0 ttl pk-yrs)   • Types: Cigarettes   • Quit date: 10/1/2019   • Years since quittin.6   • Passive exposure: Past   Smokeless Tobacco Never

## 2025-05-15 NOTE — H&P (VIEW-ONLY)
"Chronic Pain Clinic Follow-Up Evaluation    Date: May 15, 2025 - 2:34 PM    Chief Complaint: No chief complaint on file.      SUBJECTIVE:    Meghan Hernández is a 49 y.o. female who presents to [facility] pain management center for a follow up appointment for evaluation of pain and medication refill.    Patient is an established patient of Dr Mandujano     Patient has hx of: ankylosing spondylitis, cervical spondylosis and radiculitis, lumbar spondylosis, lumbar radiculitis    PMH also significant for: GERD, RA    Meghan had left C5-6 cervical facet mnbb #2 on 5/1  She reports 75% relief since the procedure    Rating pain 4/10 pain today     Low back is problematice as well.   Right  SI jiont \" irritated\"  today     Kidney function normal  - lab reviewed  Newly on Renvoq    Has started percoet was on norco in the past and had nightmares    Current Outpatient Medications:  Current Medications[1]    Current Anticoagulant Therapy: No    OARRS: Reviewed and appropriate     Pain medications reviewed:  yes    Past Medical History:  Medical History[2]    Past Surgical History:  Surgical History[3]    Family History:  Family History[4]    Social History:  Social History     Substance and Sexual Activity   Alcohol Use Not Currently     Tobacco Use History[5]  Social History     Substance and Sexual Activity   Drug Use Never         Review of Systems     Physical Examination:  Vitals:    05/15/25 1412   BP: 123/80   Pulse: 74   SpO2: 99%     General:in no acute distress  Skin: skin color, texture, turgor normal, no rashes or lesions  HEENT: normocephalic, atraumatic, sclera non-icteric   Resp: unlabored on room air   Musculoskeletal:  Neck: Supple; good ROM.  Back: N+ diffuse ttp bilat lumbar  Full ROM without reproducible pain.  Straight leg raising test negative bilaterally.    Extremities: Extremities normal. No deformities, edema, or skin discoloration  Neurological:  Mental Status: alert and oriented  Gait: steady     New " Imaging and Diagnostic Studies:  No    ASSESSMENT:    Meghan Hernández is a 49 y.o. female with history of chronic neck and low back pain. Since last visit, the pain has improved in her neck and arms since recent TIM. Her low back pain is currently aggravated, hx SI joint pain.    Problem List Items Addressed This Visit           ICD-10-CM       Musculoskeletal and Injuries    Ankylosing spondylitis M45.9       Neuro    Radiculopathy, cervical region M54.12    Cervical spondylosis M47.812    Relevant Medications    oxyCODONE-acetaminophen (Percocet) 7.5-325 mg tablet    Other Relevant Orders    Radiofrequency Ablation    [place] pain management    Lumbosacral spondylosis without myelopathy M47.817     Other Visit Diagnoses         Codes      Sacroiliitis    -  Primary M46.1            PLAN:    Diagnostics: Not indicated at this time      Physical Therapy and Rehabilitation: Continue HEP      Psychologically: No issues to be addressed today     Medication: Continue current medication regimen. See counseling note below. Prescription for percocet x 1 + medrol dose monika sent to the pharmacy   Urine tox last collected - 12/2024  Opioid agreement signed - today (5/2025)  Pill count - appropriate     Duration: years, chronic and ongoing    Intervention: Plan for left cervical facet mnb RFA     7.   Follow up: After procedure and for med refill next month     The patient continues to see benefit and improvement in their quality of life and ability to maintain ADLs.  Patient educated about the risks of taking opioids and operating a motor vehicle.  Patient reports no adverse side effects to current medication regimen.  Current regimen does allow patient to maintain ADLs.      Patient has been educated on the risks, benefits, and alternatives of controlled substances as well as the proper way to store these medications.  The patient and I discussed the nature of this medication and its side effects.  We discussed tolerance, physical  dependence, psychological dependence, addiction and opioid-induced hyperalgesia.   We discussed the fact that the patient should not drive an automobile or operate heavy machinery while taking this medication.     The above plan and management options were discussed at length with patient. Patient is in agreement with the above and verbalized understanding. It will be communicated with the referring physician via electronic record, fax, or mail.    Samantha Harris PA-C  May 15, 2025         [1]   Current Outpatient Medications   Medication Sig Dispense Refill    cyanocobalamin (Vitamin B-12) 100 mcg tablet Take by mouth. As directed      DULoxetine (Cymbalta) 60 mg DR capsule Take 1 capsule (60 mg) by mouth 2 times a day. 90 capsule 1    iron,carb/vit C/vit B12/folic (IRON 100 PLUS ORAL) Take by mouth.      methocarbamol (Robaxin) 750 mg tablet Take 1 tablet (750 mg) by mouth 3 times a day as needed for muscle spasms. 90 tablet 2    naloxone (Narcan) 4 mg/0.1 mL nasal spray Administer 1 spray (4 mg) into affected nostril(s) if needed for opioid reversal or respiratory depression.      omeprazole (PriLOSEC) 40 mg DR capsule Take by mouth.      oxyCODONE-acetaminophen (Percocet) 7.5-325 mg tablet Take 1 tablet by mouth every 6 hours if needed for severe pain (7 - 10). Do not fill before April 21, 2025. 120 tablet 0    Rinvoq 15 mg tablet extended release 24 hr       traZODone (Desyrel) 100 mg tablet Take 1 tablet (100 mg) by mouth as needed at bedtime for sleep. 90 tablet 1     No current facility-administered medications for this visit.   [2]   Past Medical History:  Diagnosis Date    Ankylosing spondylitis of site in spine (Multi) May2023    Anxiety 2022    AS (ankylosing spondylitis) (Multi)     Chronic constipation 1980    Chronic diarrhea 2024    Chronic pain disorder Long time    Headache Entire life    Low back pain In my 20s    Migraine Entire life    Neck pain In my20s    Osteoarthritis May2023    Rheumatoid  arthritis    [3]   Past Surgical History:  Procedure Laterality Date    COLONOSCOPY      w endoscopy    DILATION AND CURETTAGE OF UTERUS      EPIDURAL BLOCK INJECTION  Multiple    RADIOFREQUENCY ABLATION  2024    lumbar spine   [4]   Family History  Problem Relation Name Age of Onset    Thyroid disease Mother      Colon cancer Father Mynor     Thyroid disease Sister      Cerebral palsy Brother          1/2 brother    Stomach cancer Maternal Grandmother Grandma kite     Arthritis Paternal Grandmother Shaniqua     Breast cancer Other AUNT     Heart attack Other UNCLE    [5]   Social History  Tobacco Use   Smoking Status Former    Current packs/day: 0.00    Average packs/day: 1 pack/day for 30.0 years (30.0 ttl pk-yrs)    Types: Cigarettes    Quit date: 10/1/2019    Years since quittin.6    Passive exposure: Past   Smokeless Tobacco Never

## 2025-05-15 NOTE — PROGRESS NOTES
MEDICATION NAME: Oxycodone  STRENGTH: 7.5-325 mg   LAST FILL DATE: 25  DATE LAST TAKEN: 5/15/25  QUANTITY FILLED: 120  QUANTITY REMAININ  COUNT COMPLETED BY: ROMA DIAZ MA and TYE DIETZ       UDS LAST COMPLETED:   CONTROLLED SUBSTANCES AGREEMENT LAST SIGNED:   ORT LAST COMPLETED:  Modified Osw[employer] disability form filled out annually.

## 2025-05-26 PROBLEM — G89.4 CHRONIC PAIN SYNDROME: Status: RESOLVED | Noted: 2024-02-09 | Resolved: 2025-05-26

## 2025-05-26 PROBLEM — M54.17 RADICULOPATHY, LUMBOSACRAL REGION: Status: RESOLVED | Noted: 2023-11-15 | Resolved: 2025-05-26

## 2025-05-26 PROBLEM — M79.7 FIBROMYALGIA: Status: RESOLVED | Noted: 2024-02-09 | Resolved: 2025-05-26

## 2025-05-26 PROBLEM — M43.06 LUMBAR SPONDYLOLYSIS: Status: RESOLVED | Noted: 2025-04-09 | Resolved: 2025-05-26

## 2025-05-26 PROBLEM — N92.6 IRREGULAR MENSES: Status: RESOLVED | Noted: 2023-09-01 | Resolved: 2025-05-26

## 2025-05-26 PROBLEM — M54.2 NECK PAIN: Status: RESOLVED | Noted: 2024-02-09 | Resolved: 2025-05-26

## 2025-05-29 ENCOUNTER — OFFICE VISIT (OUTPATIENT)
Dept: PRIMARY CARE | Facility: CLINIC | Age: 50
End: 2025-05-29
Payer: COMMERCIAL

## 2025-05-29 VITALS
OXYGEN SATURATION: 99 % | TEMPERATURE: 97.1 F | SYSTOLIC BLOOD PRESSURE: 98 MMHG | BODY MASS INDEX: 27.13 KG/M2 | WEIGHT: 138.2 LBS | HEART RATE: 104 BPM | HEIGHT: 60 IN | DIASTOLIC BLOOD PRESSURE: 70 MMHG

## 2025-05-29 DIAGNOSIS — M45.0 ANKYLOSING SPONDYLITIS OF MULTIPLE SITES IN SPINE (MULTI): ICD-10-CM

## 2025-05-29 DIAGNOSIS — F41.9 ANXIETY: Primary | ICD-10-CM

## 2025-05-29 DIAGNOSIS — H93.A3 PULSATILE TINNITUS OF BOTH EARS: ICD-10-CM

## 2025-05-29 PROCEDURE — 3008F BODY MASS INDEX DOCD: CPT | Performed by: FAMILY MEDICINE

## 2025-05-29 PROCEDURE — 99214 OFFICE O/P EST MOD 30 MIN: CPT | Performed by: FAMILY MEDICINE

## 2025-05-29 RX ORDER — BUSPIRONE HYDROCHLORIDE 5 MG/1
5 TABLET ORAL 2 TIMES DAILY PRN
Qty: 60 TABLET | Refills: 5 | Status: SHIPPED | OUTPATIENT
Start: 2025-05-29 | End: 2026-05-29

## 2025-05-29 ASSESSMENT — LIFESTYLE VARIABLES: HOW MANY STANDARD DRINKS CONTAINING ALCOHOL DO YOU HAVE ON A TYPICAL DAY: PATIENT DOES NOT DRINK

## 2025-05-29 ASSESSMENT — PAIN SCALES - GENERAL: PAINLEVEL_OUTOF10: 7

## 2025-05-29 NOTE — PATIENT INSTRUCTIONS
Dr Amrita Alberts   Address: [address], [place] [address]  Phone: [phone]

## 2025-05-29 NOTE — PROGRESS NOTES
History Of Present Illness  Meghan Hernández is a 49 y.o. female presenting for Follow-up (Medication follow up.)  .    HPI     Mood improved on duloxetine but still having occasional anxiety attacks, worried about things, financial troubles, grandson with health issues. Does have occasional depression due to chronic pain.  We discussed potential of changing duloxetine to a different SSRI/SNRI, patient is hesitant to do this so far as helped the most with her mood      Past Medical History  Patient Active Problem List    Diagnosis Date Noted    Long term (current) use of opiate analgesic 04/11/2025    Cervical spondylosis 04/09/2025    Lumbosacral spondylosis without myelopathy 04/09/2025    Chronic right-sided low back pain without sciatica 01/06/2025    Vitamin D deficiency 06/20/2024    Sleep-related movement disorder 02/09/2024    Internal hemorrhoids 02/09/2024    Inflammation of sacroiliac joint 02/09/2024    Mood swings 12/01/2023    Radiculopathy, cervical region 11/15/2023    Lumbosacral radiculitis 11/14/2023    Cervical radiculitis 10/26/2023    Daytime somnolence 09/01/2023    Perimenopause 09/01/2023    Schatzki's ring 09/01/2023    Ankylosing spondylitis 05/18/2023    Rheumatoid arthritis 05/15/2023    Dysphagia 06/21/2022    Gastroesophageal reflux disease without esophagitis 06/21/2022        Medications  Medications ordered prior to the current encounter[1]     Surgical History  She has a past surgical history that includes Colonoscopy; Dilation and curettage of uterus; Epidural block injection (Multiple); and Radiofrequency ablation (06/2024).     Social History  She reports that she quit smoking about 5 years ago. Her smoking use included cigarettes. She has a 30 pack-year smoking history. She has been exposed to tobacco smoke. She has never used smokeless tobacco. She reports that she does not currently use alcohol. She reports that she does not use drugs.    Family History  Family History[2]      Allergies  Patient has no known allergies.    Immunizations  Immunization History   Administered Date(s) Administered    Moderna SARS-CoV-2 Vaccination 04/19/2021, 05/17/2021, 01/22/2022    Tdap vaccine, age 7 year and older (BOOSTRIX, ADACEL) 06/20/2024    Zoster vaccine, recombinant, adult (SHINGRIX) 01/18/2025, 05/03/2025        ROS  Negative, except as discussed in HPI     Vitals  BP 98/70   Pulse 104   Temp 36.2 °C (97.1 °F)   Ht 1.524 m (5')   Wt 62.7 kg (138 lb 3.2 oz)   SpO2 99%   BMI 26.99 kg/m²      Physical Exam  Vitals and nursing note reviewed.   Constitutional:       General: She is not in acute distress.     Appearance: Normal appearance.   Cardiovascular:      Rate and Rhythm: Normal rate and regular rhythm.      Heart sounds: Normal heart sounds.   Pulmonary:      Effort: No respiratory distress.      Breath sounds: Normal breath sounds.   Neurological:      General: No focal deficit present.      Mental Status: She is alert. Mental status is at baseline.         Relevant Results  Lab Results   Component Value Date    WBC 7.2 11/04/2024    WBC 6.5 09/07/2021    HGB 13.7 11/04/2024    HGB 13.1 09/07/2021    HCT 40.5 11/04/2024    HCT 41.5 09/07/2021    MCV 89 11/04/2024    MCV 92.6 09/07/2021     11/04/2024     09/07/2021     Lab Results   Component Value Date     (L) 11/04/2024     09/07/2021    K 3.8 11/04/2024    K 4.2 09/07/2021     11/04/2024     09/07/2021    CO2 27 11/04/2024    CO2 24 09/07/2021    BUN 4 (L) 11/04/2024    BUN 4 (L) 09/07/2021    CREATININE 0.62 11/04/2024    CREATININE 0.6 09/07/2021    CALCIUM 8.8 11/04/2024    CALCIUM 9.4 09/07/2021    PROT 6.7 11/04/2024    PROT 7.1 09/07/2021    BILITOT 0.5 11/04/2024    BILITOT 0.3 09/07/2021    ALKPHOS 52 11/04/2024    ALKPHOS 76 09/07/2021    ALT 8 11/04/2024    ALT 8 09/07/2021    AST 11 11/04/2024    AST 17 09/07/2021    GLUCOSE 100 (H) 11/04/2024    GLUCOSE 103 (H) 09/07/2021     Lab  Results   Component Value Date    HGBA1C 5.2 11/04/2024     Lab Results   Component Value Date    TSH 2.40 11/04/2024      Lab Results   Component Value Date    CHOL 185 06/26/2024    TRIG 61 06/26/2024    HDL 64.0 06/26/2024           Assessment/Plan   Meghan was seen today for follow-up.  Diagnoses and all orders for this visit:  Anxiety (Primary)  Comments:  Keeping duloxetine the same will add buspirone for her to use as needed  Orders:  -     busPIRone (Buspar) 5 mg tablet; Take 1 tablet (5 mg) by mouth 2 times a day as needed (anxiety).  Pulsatile tinnitus of both ears  -     Referral to ENT; Future  Ankylosing spondylitis of multiple sites in spine (Multi)  -     Referral to Dermatology         Counseling:   Medication education:   -Education:  The patient is counseled regarding potential side-effects of any and all new medications  -Understanding:  Patient expressed understanding of information discussed today  -Adherence:  No barriers to adherence identified    Final treatment plan is a result of shared decision making with patient.         Pepe Gee MD          [1]   Current Outpatient Medications   Medication Sig Dispense Refill    cyanocobalamin (Vitamin B-12) 100 mcg tablet Take by mouth. As directed      DULoxetine (Cymbalta) 60 mg DR capsule Take 1 capsule (60 mg) by mouth 2 times a day. 90 capsule 1    iron,carb/vit C/vit B12/folic (IRON 100 PLUS ORAL) Take by mouth.      methocarbamol (Robaxin) 750 mg tablet Take 1 tablet (750 mg) by mouth 3 times a day as needed for muscle spasms. 90 tablet 2    naloxone (Narcan) 4 mg/0.1 mL nasal spray Administer 1 spray (4 mg) into affected nostril(s) if needed for opioid reversal or respiratory depression.      omeprazole (PriLOSEC) 40 mg DR capsule Take by mouth.      oxyCODONE-acetaminophen (Percocet) 7.5-325 mg tablet Take 1 tablet by mouth every 6 hours if needed for severe pain (7 - 10). Do not fill before May 21, 2025. 120 tablet 0    Rinvoq 15 mg tablet  extended release 24 hr       traZODone (Desyrel) 100 mg tablet Take 1 tablet (100 mg) by mouth as needed at bedtime for sleep. 90 tablet 1    busPIRone (Buspar) 5 mg tablet Take 1 tablet (5 mg) by mouth 2 times a day as needed (anxiety). 60 tablet 5     No current facility-administered medications for this visit.   [2]   Family History  Problem Relation Name Age of Onset    Thyroid disease Mother      Colon cancer Father Mynor     Thyroid disease Sister      Cerebral palsy Brother          1/2 brother    Stomach cancer Maternal Grandmother Grandma kite     Arthritis Paternal Grandmother Shaniqua     Breast cancer Other AUNT     Heart attack Other UNCLE

## 2025-06-05 ENCOUNTER — APPOINTMENT (OUTPATIENT)
Dept: GASTROENTEROLOGY | Facility: HOSPITAL | Age: 50
End: 2025-06-05
Payer: COMMERCIAL

## 2025-06-05 ENCOUNTER — HOSPITAL ENCOUNTER (OUTPATIENT)
Dept: GASTROENTEROLOGY | Facility: HOSPITAL | Age: 50
Discharge: HOME | End: 2025-06-05
Payer: COMMERCIAL

## 2025-06-05 VITALS
BODY MASS INDEX: 27.09 KG/M2 | RESPIRATION RATE: 16 BRPM | TEMPERATURE: 96.3 F | SYSTOLIC BLOOD PRESSURE: 110 MMHG | OXYGEN SATURATION: 100 % | HEIGHT: 60 IN | HEART RATE: 79 BPM | DIASTOLIC BLOOD PRESSURE: 74 MMHG | WEIGHT: 138 LBS

## 2025-06-05 DIAGNOSIS — M47.812 CERVICAL SPONDYLOSIS: ICD-10-CM

## 2025-06-05 PROCEDURE — 3700000012 HC SEDATION LEVEL 5+ TIME - INITIAL 15 MINUTES 5/> YEARS

## 2025-06-05 PROCEDURE — 2500000004 HC RX 250 GENERAL PHARMACY W/ HCPCS (ALT 636 FOR OP/ED): Performed by: ANESTHESIOLOGY

## 2025-06-05 PROCEDURE — 99152 MOD SED SAME PHYS/QHP 5/>YRS: CPT | Performed by: ANESTHESIOLOGY

## 2025-06-05 PROCEDURE — 64633 DESTROY CERV/THOR FACET JNT: CPT | Performed by: ANESTHESIOLOGY

## 2025-06-05 RX ORDER — BUPIVACAINE HYDROCHLORIDE 2.5 MG/ML
INJECTION, SOLUTION EPIDURAL; INFILTRATION; INTRACAUDAL; PERINEURAL AS NEEDED
Status: COMPLETED | OUTPATIENT
Start: 2025-06-05 | End: 2025-06-05

## 2025-06-05 RX ORDER — FENTANYL CITRATE 50 UG/ML
INJECTION, SOLUTION INTRAMUSCULAR; INTRAVENOUS AS NEEDED
Status: COMPLETED | OUTPATIENT
Start: 2025-06-05 | End: 2025-06-05

## 2025-06-05 RX ORDER — LIDOCAINE HYDROCHLORIDE 20 MG/ML
INJECTION, SOLUTION EPIDURAL; INFILTRATION; INTRACAUDAL; PERINEURAL AS NEEDED
Status: COMPLETED | OUTPATIENT
Start: 2025-06-05 | End: 2025-06-05

## 2025-06-05 RX ORDER — LIDOCAINE HYDROCHLORIDE 10 MG/ML
INJECTION, SOLUTION EPIDURAL; INFILTRATION; INTRACAUDAL; PERINEURAL AS NEEDED
Status: COMPLETED | OUTPATIENT
Start: 2025-06-05 | End: 2025-06-05

## 2025-06-05 RX ORDER — DEXAMETHASONE SODIUM PHOSPHATE 10 MG/ML
INJECTION INTRAMUSCULAR; INTRAVENOUS AS NEEDED
Status: COMPLETED | OUTPATIENT
Start: 2025-06-05 | End: 2025-06-05

## 2025-06-05 RX ORDER — MIDAZOLAM HYDROCHLORIDE 1 MG/ML
INJECTION, SOLUTION INTRAMUSCULAR; INTRAVENOUS AS NEEDED
Status: COMPLETED | OUTPATIENT
Start: 2025-06-05 | End: 2025-06-05

## 2025-06-05 RX ADMIN — MIDAZOLAM 2 MG: 1 INJECTION INTRAMUSCULAR; INTRAVENOUS at 08:42

## 2025-06-05 RX ADMIN — FENTANYL CITRATE 50 MCG: 0.05 INJECTION, SOLUTION INTRAMUSCULAR; INTRAVENOUS at 08:42

## 2025-06-05 RX ADMIN — LIDOCAINE HYDROCHLORIDE 2 ML: 20 INJECTION, SOLUTION EPIDURAL; INFILTRATION; INTRACAUDAL; PERINEURAL at 08:45

## 2025-06-05 RX ADMIN — DEXAMETHASONE SODIUM PHOSPHATE 10 MG: 10 INJECTION, SOLUTION INTRAMUSCULAR; INTRAVENOUS at 08:45

## 2025-06-05 RX ADMIN — FENTANYL CITRATE 50 MCG: 0.05 INJECTION, SOLUTION INTRAMUSCULAR; INTRAVENOUS at 08:49

## 2025-06-05 RX ADMIN — BUPIVACAINE HYDROCHLORIDE 1 ML: 2.5 INJECTION, SOLUTION EPIDURAL; INFILTRATION; INTRACAUDAL; PERINEURAL at 08:45

## 2025-06-05 RX ADMIN — LIDOCAINE HYDROCHLORIDE 3 ML: 10 INJECTION, SOLUTION EPIDURAL; INFILTRATION; INTRACAUDAL; PERINEURAL at 08:44

## 2025-06-05 ASSESSMENT — PAIN - FUNCTIONAL ASSESSMENT
PAIN_FUNCTIONAL_ASSESSMENT: 0-10

## 2025-06-05 ASSESSMENT — COLUMBIA-SUICIDE SEVERITY RATING SCALE - C-SSRS
6. HAVE YOU EVER DONE ANYTHING, STARTED TO DO ANYTHING, OR PREPARED TO DO ANYTHING TO END YOUR LIFE?: NO
1. IN THE PAST MONTH, HAVE YOU WISHED YOU WERE DEAD OR WISHED YOU COULD GO TO SLEEP AND NOT WAKE UP?: NO
2. HAVE YOU ACTUALLY HAD ANY THOUGHTS OF KILLING YOURSELF?: NO

## 2025-06-05 ASSESSMENT — PAIN SCALES - GENERAL
PAINLEVEL_OUTOF10: 8
PAINLEVEL_OUTOF10: 7
PAINLEVEL_OUTOF10: 5 - MODERATE PAIN

## 2025-06-05 ASSESSMENT — PAIN DESCRIPTION - DESCRIPTORS: DESCRIPTORS: THROBBING

## 2025-06-05 NOTE — DISCHARGE INSTRUCTIONS
DISCHARGE INSTRUCTIONS FOR INJECTIONS     You underwent a left cervical medial branch nerve radiofrequency ablation today    Aftermost injections, it is recommended that you relax and limit your activity for the remainder of the day unless you have been told otherwise by your pain physician.  You should not drive a car, operate machinery, or make important legal decisions unless otherwise directed by your pain physician.  You may resume your normal activity, including exercise, tomorrow.      Keep a written pain diary of how much pain relief you experienced following the injection procedure and the length of time of pain relief you experienced pain relief. Following diagnostic injections like medial branch nerve blocks, sacroiliac joint blocks, stellate ganglion injections and other blocks, it is very important you record the specific amount of pain relief you experienced immediately after the injectionand how long it lasted. Your doctor will ask you for this information at your follow up visit.     For all injections, please keep the injection site dry and inspect the site for a couple of days. You may remove the Band-Aid the day of the injection at any time.     Some discomfort, bruising or slight swelling may occur at the injection site. This is not abnormal if it occurs.  If needed you may:    -Take over the counter medication such as Tylenol or Motrin.   -Apply an ice pack for 30 minutes, 2 to 3 times a day for the first 24 hours.     You may shower today; no soaking baths, hot tubs, whirlpools or swimming pools for two days.      If you are given steroids in your injection, it may take 3-5 days for the steroid medication to take effect. You may notice a worsening of your symptoms for 1-2 days after the injection. This is not abnormal.  You may use acetaminophen, ibuprofen, or prescription medication that your doctor may have prescribed for you if you need to do so.     A few common side effects of steroids  include facial flushing, sweating, restlessness, irritability,difficulty sleeping, increase in blood sugar, and increased blood pressure. If you have diabetes, please monitor your blood sugar at least once a day for at least 5 days. If you have poorly controlled high blood pressure, monitoryour blood pressure for at least 2 days and contact your primary care physician if these numbers are unusually high for you.      If you take aspirin or non-steroidal anti-inflammatory drugs (examples are Motrin, Advil, ibuprofen, Naprosyn, Voltaren, Relafen, etc.) you may restart these this evening, but stop taking it 3 days before your next appointment, unless instructed otherwiseby your physician.      You do not need to discontinue non-aspirin-containing pain medications prior to an injection (examples: Celebrex, tramadol, hydrocodone and acetaminophen).      If you take a blood thinning medication (Coumadin, Lovenox, Fragmin,Ticlid, Plavix, Pradaxa, etc.), please discuss this with your primary care physician/cardiologist and your pain physician. These medications MUST be discontinued before you can have an injection safely, without the risk of uncontrolled bleeding. If these medications are not discontinued for an appropriate period of time, you will not be able to receivean injection.      If you are taking Coumadin, please have your INR checked the morning of your procedure and bringthe result to your appointment unless otherwise instructed. If your INR is over 1.2, your injection may need to be rescheduled to avoid uncontrolled bleeding from the needle placement.     Call [facility] at [phone] between 8am-4pm Monday - Friday if you are experiencing the following:    If you received an epidural or spinal injection:    -Headache that doesnot go away with medicine, is worse when sitting or standing up, and is greatly relieved upon lying down.   -Severe pain worse than or different than your baseline  pain.   -Chills or fever (101º F or greater).   -Drainage or signs of infection at the injection site     Go directly to the Emergency Department if you are experiencing the following and received an epidural or spinal injection:   -Abrupt weakness or progressive weakness in your legs that starts after you leave the clinic.   -Abrupt severe or worsening numbness in your legs.   -Inability to urinate after the injection or loss of bowel or bladder control without the urge to defecate or urinate.     If you have a clinical question that cannot wait until your next appointment, please call [phone] between 8am-4pm Monday - Friday or send a [employer] message. We do our best to return all non-emergency messages within 24 hours, Monday - Friday. A nurse or physician will return your message.      If you need to cancel an appointment, please call the scheduling staff at [phone] during normal business hours or leave a message at least 24 hours in advance.     If you are going to be sedated for your next procedure, you MUST have responsible adult who can legally drive accompany you home. You cannot eat or drink for eight hours prior to the planned procedure if you are going to receive sedation. You may take your non-blood thinning medications with a small sip of water.

## 2025-06-05 NOTE — POST-PROCEDURE NOTE
Wheelchair discharge per sedation protocol. Accompanied by family. Volunteer to transport to main lobby where [employer] is waiting.

## 2025-06-05 NOTE — INTERVAL H&P NOTE
H&P reviewed. The patient was examined and there are no changes to the H&P. Meghan Hernández is a 49 y.o. female with PMH MDD, RA, ankylosing spondylitis who presents for left C5-6 medial branch nerve RFA w/ IV sed to target pain generator as seen on imaging and minimize risk/likelihood of chronic opioid use and/or surgery. Patient's pain stable and persistent from last visit.  Appropriately NPO. ASA 2. No personal/family hx issues with anesthesia. Denies allergies to Latex, steroids, local anesthetics, or iodine/contrast. Denies being on blood thinners. Not diabetic.  Denies fever, chills, NS, CP, SOB, cough, N/V.    Discussed procedure risks/benefits in detail with patient. Pt meets medical necessity for procedure due to failure of conservative measures. Reviewed procedural risks including bleeding, infection, nerve damage, paralysis. Also reviewed mitigating factors such as screening for infection/blood thinner use, sterile precautions, and image-guidance when applicable. All questions answered. Pt/guardian expressed understanding and choose to proceed      Sofiya Mandujano MD  Anesthesiologist & Interventional Pain Physician   [facility] [place]  O: [phone]  F: [phone]  8:35 AM  06/05/25

## 2025-06-16 DIAGNOSIS — F32.A DEPRESSION, UNSPECIFIED DEPRESSION TYPE: ICD-10-CM

## 2025-06-17 ENCOUNTER — PATIENT MESSAGE (OUTPATIENT)
Dept: PAIN MEDICINE | Facility: CLINIC | Age: 50
End: 2025-06-17
Payer: COMMERCIAL

## 2025-06-17 DIAGNOSIS — M47.812 CERVICAL SPONDYLOSIS: ICD-10-CM

## 2025-06-18 RX ORDER — OXYCODONE AND ACETAMINOPHEN 7.5; 325 MG/1; MG/1
1 TABLET ORAL EVERY 6 HOURS PRN
Qty: 28 TABLET | Refills: 0 | Status: SHIPPED | OUTPATIENT
Start: 2025-06-20 | End: 2025-06-27

## 2025-06-20 RX ORDER — DULOXETIN HYDROCHLORIDE 60 MG/1
60 CAPSULE, DELAYED RELEASE ORAL 2 TIMES DAILY
Qty: 90 CAPSULE | Refills: 1 | Status: SHIPPED | OUTPATIENT
Start: 2025-06-20 | End: 2025-09-18

## 2025-06-26 ENCOUNTER — OFFICE VISIT (OUTPATIENT)
Dept: PAIN MEDICINE | Facility: CLINIC | Age: 50
End: 2025-06-26
Payer: COMMERCIAL

## 2025-06-26 VITALS
OXYGEN SATURATION: 99 % | WEIGHT: 138 LBS | DIASTOLIC BLOOD PRESSURE: 76 MMHG | SYSTOLIC BLOOD PRESSURE: 114 MMHG | BODY MASS INDEX: 27.09 KG/M2 | HEART RATE: 86 BPM | HEIGHT: 60 IN

## 2025-06-26 DIAGNOSIS — M47.817 LUMBOSACRAL SPONDYLOSIS WITHOUT MYELOPATHY: ICD-10-CM

## 2025-06-26 DIAGNOSIS — M45.9 ANKYLOSING SPONDYLITIS, UNSPECIFIED SITE OF SPINE (MULTI): ICD-10-CM

## 2025-06-26 DIAGNOSIS — M54.17 LUMBOSACRAL RADICULITIS: Primary | ICD-10-CM

## 2025-06-26 DIAGNOSIS — M47.812 CERVICAL SPONDYLOSIS: ICD-10-CM

## 2025-06-26 DIAGNOSIS — M54.12 RADICULOPATHY, CERVICAL REGION: ICD-10-CM

## 2025-06-26 DIAGNOSIS — G89.4 CHRONIC PAIN SYNDROME: ICD-10-CM

## 2025-06-26 PROCEDURE — 99214 OFFICE O/P EST MOD 30 MIN: CPT | Performed by: PHYSICIAN ASSISTANT

## 2025-06-26 PROCEDURE — 3008F BODY MASS INDEX DOCD: CPT | Performed by: PHYSICIAN ASSISTANT

## 2025-06-26 RX ORDER — OXYCODONE AND ACETAMINOPHEN 7.5; 325 MG/1; MG/1
1 TABLET ORAL EVERY 6 HOURS PRN
Qty: 120 TABLET | Refills: 0 | Status: SHIPPED | OUTPATIENT
Start: 2025-06-27 | End: 2025-07-27

## 2025-06-26 RX ORDER — TIZANIDINE 4 MG/1
2-4 TABLET ORAL 2 TIMES DAILY PRN
Qty: 60 TABLET | Refills: 0 | Status: SHIPPED | OUTPATIENT
Start: 2025-06-27 | End: 2025-07-27

## 2025-06-26 ASSESSMENT — PAIN SCALES - GENERAL
PAINLEVEL_OUTOF10: 4
PAINLEVEL_OUTOF10: 4

## 2025-06-26 ASSESSMENT — PAIN DESCRIPTION - DESCRIPTORS: DESCRIPTORS: STABBING

## 2025-06-26 ASSESSMENT — PAIN - FUNCTIONAL ASSESSMENT: PAIN_FUNCTIONAL_ASSESSMENT: 0-10

## 2025-06-26 NOTE — PROGRESS NOTES
MEDICATION NAME: Percocet  STRENGTH: 7.5-325 mg  LAST FILL DATE: 25  DATE LAST TAKEN: 25  QUANTITY FILLED: 20  QUANTITY REMAININ  COUNT COMPLETED BY: ROMA DIAZ MA and VINAY HUSSEIN PA-C      UDS COMPLETED  CONTROLLED SUBSTANCES AGREEMENT SIGNED  ORT COMPLETED  Modified Oswestry disability form filled out annually.

## 2025-06-26 NOTE — PROGRESS NOTES
Chronic Pain Clinic Follow-Up Evaluation    Date: June 26, 2025 - 12:43 PM    Chief Complaint:   Chief Complaint   Patient presents with    Post CRFA       SUBJECTIVE:    Meghan Hernández is a 49 y.o. female who presents to [facility] for a follow up appointment for evaluation of pain and medication refill     .  Patient is an established patient of Dr Mandujano      Patient has hx of: ankylosing spondylitis, cervical spondylosis and radiculitis, lumbar spondylosis, lumbar radiculitis     PMH also significant for: GERD, RA     Since the last visit, Meghan had left C5-6 cervical facet mnb RFA on 6/5    For 2 weeks after the procedure, she had increased neck and shoulder muscle.   Now reporting 50% pain relief    Hand and wrist pain - can be in elbows     Mornings are the worst time of pain     Nightmares are better-had side effect with norco and was switched to percocet       Current Outpatient Medications:  Current Medications[1]    Current Anticoagulant Therapy: No    OARRS: Reviewed and appropriate     Pain medications reviewed:  Yes    Past Medical History:  Medical History[2]    Past Surgical History:  Surgical History[3]    Family History:  Family History[4]    Social History:  Social History     Substance and Sexual Activity   Alcohol Use Not Currently     Tobacco Use History[5]  Social History     Substance and Sexual Activity   Drug Use Never         Review of Systems   Constitutional: Negative.    HENT: Negative.     Eyes: Negative.    Respiratory: Negative.     Cardiovascular: Negative.    Gastrointestinal: Negative.    Endocrine: Negative.    Genitourinary: Negative.    Musculoskeletal:  Positive for neck pain.   Skin: Negative.    Allergic/Immunologic: Negative.    Neurological: Negative.    Hematological: Negative.    Psychiatric/Behavioral: Negative.          Physical Examination:  Vitals:    06/26/25 1454   BP: 114/76   Pulse: 86   SpO2: 99%       General: In no acute  distress  Skin: skin color, texture, turgor normal, no rashes or lesions  HEENT: normocephalic, atraumatic, sclera non-icteric   Resp: unlabored on room air   Musculoskeletal:  Neck: Supple; good ROM.  Back: No pain on palpation of the lumbar spine.   Extremities: Extremities normal. No deformities, edema, or skin discoloration  Neurological:  Mental Status: alert and oriented  Gait: steady    New Imaging and Diagnostic Studies:  No    ASSESSMENT:    Meghan Hernández is a 49 y.o. female with chronic neck pain secondary to cervical spondylosis and cervical radiculitis. She reports 50% pain relief since RFA on 6/5.Also with low back pain secondary to lumbosacral spondylosis, ankylosing spondylitis and radiculitis. Doing well with percocet, no side effects. She feels the medication allows her to maintain her activities including ADL's.      Problem List Items Addressed This Visit           ICD-10-CM    Lumbosacral radiculitis - Primary M54.17    Radiculopathy, cervical region M54.12    Relevant Medications    tiZANidine (Zanaflex) 4 mg tablet    Other Relevant Orders    Opiate/Opioid/Benzo Prescription Compliance (Completed)    Ankylosing spondylitis M45.9    Cervical spondylosis M47.812    Relevant Medications    oxyCODONE-acetaminophen (Percocet) 7.5-325 mg tablet    tiZANidine (Zanaflex) 4 mg tablet    Lumbosacral spondylosis without myelopathy M47.817    Relevant Medications    tiZANidine (Zanaflex) 4 mg tablet    Other Relevant Orders    Opiate/Opioid/Benzo Prescription Compliance (Completed)     Other Visit Diagnoses         Codes      Chronic pain syndrome     G89.4    Relevant Orders    Opiate/Opioid/Benzo Prescription Compliance (Completed)            PLAN:    Diagnostics: Not indicated at this time      Physical Therapy and Rehabilitation: Continue HEP      Psychologically: No need for psychologic intervention from my standpoint. There are no mental health issues of which I am aware that are contributing to the  patient's pain. There are no substance abuse or alcohol abuse issues of which I am aware that are contributing to the patient's pain.     Medication: Continue current medication regimen. See counseling note below. Prescription for percocet + tizandidine sent to the pharmacy   Urine tox last collected - today (6/2025). This morning last dose of percocet  Opioid agreement signed - 5/2025  Pill count - appropriate     Duration: years, chronic and ongoing     Intervention: None needed at this time.     7.   Follow up: 1 month for medication refill     The patient continues to see benefit and improvement in their quality of life and ability to maintain ADLs.  Patient educated about the risks of taking opioids and operating a motor vehicle.  Patient reports no adverse side effects to current medication regimen.  Current regimen does allow patient to maintain ADLs.        Patient has been educated on the risks, benefits, and alternatives of controlled substances as well as the proper way to store these medications.  The patient and I discussed the nature of this medication and its side effects.  We discussed tolerance, physical dependence, psychological dependence, addiction and opioid-induced hyperalgesia.   We discussed the fact that the patient should not drive an automobile or operate heavy machinery while taking this medication.     The above plan and management options were discussed at length with patient. Patient is in agreement with the above and verbalized understanding. It will be communicated with the referring physician via electronic record, fax, or mail.    Samantha Harris PA-C  June 26, 2025           [1]   Current Outpatient Medications   Medication Sig Dispense Refill    busPIRone (Buspar) 5 mg tablet Take 1 tablet (5 mg) by mouth 2 times a day as needed (anxiety). 60 tablet 5    cyanocobalamin (Vitamin B-12) 100 mcg tablet Take by mouth. As directed      DULoxetine (Cymbalta) 60 mg DR capsule Take 1  capsule (60 mg) by mouth 2 times a day. 90 capsule 1    iron,carb/vit C/vit B12/folic (IRON 100 PLUS ORAL) Take by mouth.      methocarbamol (Robaxin) 750 mg tablet Take 1 tablet (750 mg) by mouth 3 times a day as needed for muscle spasms. 90 tablet 2    naloxone (Narcan) 4 mg/0.1 mL nasal spray Administer 1 spray (4 mg) into affected nostril(s) if needed for opioid reversal or respiratory depression.      omeprazole (PriLOSEC) 40 mg DR capsule Take by mouth.      oxyCODONE-acetaminophen (Percocet) 7.5-325 mg tablet Take 1 tablet by mouth every 6 hours if needed for severe pain (7 - 10) for up to 7 days. Do not fill before June 20, 2025. 28 tablet 0    Rinvoq 15 mg tablet extended release 24 hr       traZODone (Desyrel) 100 mg tablet Take 1 tablet (100 mg) by mouth as needed at bedtime for sleep. 90 tablet 1     No current facility-administered medications for this visit.   [2]   Past Medical History:  Diagnosis Date    Ankylosing spondylitis of site in spine (Multi) May2023    Anxiety 2022    AS (ankylosing spondylitis) (Multi)     Chronic constipation 1980    Chronic diarrhea 2024    Chronic pain disorder Long time    Headache Entire life    Low back pain In my 20s    Migraine Entire life    Neck pain In my20s    Osteoarthritis May2023    Rheumatoid arthritis    [3]   Past Surgical History:  Procedure Laterality Date    COLONOSCOPY      w endoscopy    DILATION AND CURETTAGE OF UTERUS      EPIDURAL BLOCK INJECTION  Multiple    RADIOFREQUENCY ABLATION  06/2024    lumbar spine   [4]   Family History  Problem Relation Name Age of Onset    Thyroid disease Mother      Colon cancer Father Mynor     Thyroid disease Sister      Cerebral palsy Brother          1/2 brother    Stomach cancer Maternal Grandmother Grandma kite     Arthritis Paternal Grandmother Shaniqua     Breast cancer Other AUNT     Heart attack Other UNCLE    [5]   Social History  Tobacco Use   Smoking Status Former    Current packs/day: 0.00    Average  packs/day: 1 pack/day for 30.0 years (30.0 ttl pk-yrs)    Types: Cigarettes    Quit date: 10/1/2019    Years since quittin.7    Passive exposure: Past   Smokeless Tobacco Never      Other Perimenopause

## 2025-07-04 LAB
1OH-MIDAZOLAM UR-MCNC: NEGATIVE NG/ML
7AMINOCLONAZEPAM UR-MCNC: NEGATIVE NG/ML
A-OH ALPRAZ UR-MCNC: NEGATIVE NG/ML
A-OH-TRIAZOLAM UR-MCNC: NEGATIVE NG/ML
AMPHETAMINES UR QL: NEGATIVE NG/ML
BARBITURATES UR QL: NEGATIVE NG/ML
BZE UR QL: NEGATIVE NG/ML
CODEINE UR-MCNC: NEGATIVE NG/ML
CREAT UR-MCNC: 18.2 MG/DL
DRUG SCREEN COMMENT UR-IMP: ABNORMAL
EDDP UR-MCNC: NEGATIVE NG/ML
FENTANYL UR-MCNC: NEGATIVE NG/ML
HYDROCODONE UR-MCNC: NEGATIVE NG/ML
HYDROMORPHONE UR-MCNC: NEGATIVE NG/ML
LORAZEPAM UR-MCNC: NEGATIVE NG/ML
METHADONE UR-MCNC: NEGATIVE NG/ML
MORPHINE UR-MCNC: NEGATIVE NG/ML
NORDIAZEPAM UR-MCNC: NEGATIVE NG/ML
NORFENTANYL UR-MCNC: NEGATIVE NG/ML
NORHYDROCODONE UR CFM-MCNC: NEGATIVE NG/ML
NOROXYCODONE UR CFM-MCNC: 1250 NG/ML
NORTRAMADOL UR-MCNC: NEGATIVE NG/ML
OH-ETHYLFLURAZ UR-MCNC: NEGATIVE NG/ML
OXAZEPAM UR-MCNC: NEGATIVE NG/ML
OXIDANTS UR QL: NEGATIVE MCG/ML
OXYCODONE UR CFM-MCNC: 640 NG/ML
OXYMORPHONE UR CFM-MCNC: 215 NG/ML
PCP UR QL: NEGATIVE NG/ML
PH UR: 6.7 [PH] (ref 4.5–9)
QUEST 6 ACETYLMORPHINE: NEGATIVE NG/ML
QUEST NOTES AND COMMENTS: ABNORMAL
QUEST ZOLPIDEM: NEGATIVE NG/ML
SP GR UR: 1
TEMAZEPAM UR-MCNC: NEGATIVE NG/ML
THC UR QL: NEGATIVE NG/ML
TRAMADOL UR-MCNC: NEGATIVE NG/ML
ZOLPIDEM PHENYL-4-CARB UR CFM-MCNC: NEGATIVE NG/ML

## 2025-07-05 ASSESSMENT — ENCOUNTER SYMPTOMS
CONSTITUTIONAL NEGATIVE: 1
EYES NEGATIVE: 1
NECK PAIN: 1
ENDOCRINE NEGATIVE: 1
NEUROLOGICAL NEGATIVE: 1
PSYCHIATRIC NEGATIVE: 1
GASTROINTESTINAL NEGATIVE: 1
HEMATOLOGIC/LYMPHATIC NEGATIVE: 1
RESPIRATORY NEGATIVE: 1
CARDIOVASCULAR NEGATIVE: 1
ALLERGIC/IMMUNOLOGIC NEGATIVE: 1

## 2025-07-24 ENCOUNTER — OFFICE VISIT (OUTPATIENT)
Dept: ORTHOPEDIC SURGERY | Facility: HOSPITAL | Age: 50
End: 2025-07-24
Payer: COMMERCIAL

## 2025-07-24 ENCOUNTER — HOSPITAL ENCOUNTER (OUTPATIENT)
Dept: RADIOLOGY | Facility: HOSPITAL | Age: 50
Discharge: HOME | End: 2025-07-24
Payer: COMMERCIAL

## 2025-07-24 DIAGNOSIS — G89.29 CHRONIC PAIN OF LEFT THUMB: ICD-10-CM

## 2025-07-24 DIAGNOSIS — M79.645 CHRONIC PAIN OF LEFT THUMB: ICD-10-CM

## 2025-07-24 DIAGNOSIS — M65.312 TRIGGER FINGER OF LEFT THUMB: Primary | ICD-10-CM

## 2025-07-24 PROCEDURE — 2500000004 HC RX 250 GENERAL PHARMACY W/ HCPCS (ALT 636 FOR OP/ED): Performed by: ORTHOPAEDIC SURGERY

## 2025-07-24 PROCEDURE — 76942 ECHO GUIDE FOR BIOPSY: CPT | Performed by: ORTHOPAEDIC SURGERY

## 2025-07-24 PROCEDURE — 73130 X-RAY EXAM OF HAND: CPT | Mod: LT

## 2025-07-24 PROCEDURE — 99203 OFFICE O/P NEW LOW 30 MIN: CPT | Mod: 25 | Performed by: ORTHOPAEDIC SURGERY

## 2025-07-24 RX ORDER — LIDOCAINE HYDROCHLORIDE 10 MG/ML
1 INJECTION, SOLUTION INFILTRATION; PERINEURAL
Status: COMPLETED | OUTPATIENT
Start: 2025-07-24 | End: 2025-07-24

## 2025-07-24 RX ORDER — METHYLPREDNISOLONE ACETATE 40 MG/ML
20 INJECTION, SUSPENSION INTRA-ARTICULAR; INTRALESIONAL; INTRAMUSCULAR; SOFT TISSUE
Status: COMPLETED | OUTPATIENT
Start: 2025-07-24 | End: 2025-07-24

## 2025-07-24 RX ADMIN — METHYLPREDNISOLONE ACETATE 20 MG: 40 INJECTION, SUSPENSION INTRA-ARTICULAR; INTRALESIONAL; INTRAMUSCULAR; SOFT TISSUE at 09:50

## 2025-07-24 RX ADMIN — LIDOCAINE HYDROCHLORIDE 1 ML: 10 INJECTION, SOLUTION INFILTRATION; PERINEURAL at 09:50

## 2025-07-24 ASSESSMENT — PAIN - FUNCTIONAL ASSESSMENT: PAIN_FUNCTIONAL_ASSESSMENT: 0-10

## 2025-07-24 ASSESSMENT — ENCOUNTER SYMPTOMS
BRUISES/BLEEDS EASILY: 0
NUMBNESS: 0

## 2025-07-24 ASSESSMENT — PAIN SCALES - GENERAL: PAINLEVEL_OUTOF10: 4

## 2025-07-24 NOTE — PROGRESS NOTES
Reason for Appointment  Chief Complaint   Patient presents with    Left Thumb - Pain     History of Present Illness  New patient is a 49 y.o. female here today for evaluation of left thumb pain, classic trigger finger over for 2 weeks.  She does have multiple medical issues and she has seronegative arthritis.  She is on medication.  We had a long discussion about trigger finger.  Doing well otherwise had a mild trigger in the ring finger in the past but no triggering now she notices a small bump in the region no numbness in that upper extremity no other pain at present she has got global pain in multiple joints but comes in for this isolated problem worse with pinching better with rest will actually get stuck quite painful.  Full history reviewed plain x-rays do not show any severe arthritis just some mild degenerative findings..     Medical History[1]    Surgical History[2]    Medication Documentation Review Audit       Reviewed by Bebeto William MD (Physician) on 25 at 0947      Medication Order Taking? Sig Documenting Provider Last Dose Status   busPIRone (Buspar) 5 mg tablet [id number] Yes Take 1 tablet (5 mg) by mouth 2 times a day as needed (anxiety). Pepe Gee MD 2025 Active   cyanocobalamin (Vitamin B-12) 100 mcg tablet [id number] Yes Take by mouth. As directed Historical Provider, MD 2025 Active   DULoxetine (Cymbalta) 60 mg DR capsule [id number] Yes Take 1 capsule (60 mg) by mouth 2 times a day. Kavya NUR Svec, APRN-CNP  Active   iron,carb/vit C/vit B12/folic (IRON 100 PLUS ORAL) [id number] Yes Take by mouth. Historical Provider, MD 2025 Active   methocarbamol (Robaxin) 750 mg tablet [id number] No Take 1 tablet (750 mg) by mouth 3 times a day as needed for muscle spasms. Sofiya Mandujano MD Past Week  25 [id number]   naloxone (Narcan) 4 mg/0.1 mL nasal spray [id number] Yes Administer 1 spray (4 mg) into affected nostril(s) if needed for opioid reversal or respiratory depression.  Historical Provider, MD  Active   omeprazole (PriLOSEC) 40 mg DR capsule [id number] Yes Take by mouth. Historical Provider, MD Past Week Active   oxyCODONE-acetaminophen (Percocet) 7.5-325 mg tablet [id number] Yes Take 1 tablet by mouth every 6 hours if needed for severe pain (7 - 10). Do not fill before June 27, 2025. Samantha Harris PA-C  Active   Rinvoq 15 mg tablet extended release 24 hr [id number] Yes  Historical Provider, MD 6/4/2025 Active   tiZANidine (Zanaflex) 4 mg tablet [id number] Yes Take 0.5-1 tablets (2-4 mg) by mouth 2 times a day as needed for muscle spasms. Do not fill before June 27, 2025. Samantha Harris PA-C  Active   traZODone (Desyrel) 100 mg tablet [id number] Yes Take 1 tablet (100 mg) by mouth as needed at bedtime for sleep. Pepe Gee MD 6/4/2025 Active                    RX Allergies[3]    Review of Systems   Allergic/Immunologic: Positive for immunocompromised state.   Neurological:  Negative for numbness.   Hematological:  Does not bruise/bleed easily.       Exam   Patient is alert awake in mild distress oriented person place and time mood is good neck shoulder on the left elbow wrist and hand show overall good range of motion isolated tenderness left thumb A1 pulley palpable triggering which locks in place but able to extend this.  No significant CMC joint tenderness no other gross joint swelling or synovitis good pulses good sensation  Assessment   Encounter Diagnosis   Name Primary?    Chronic pain of left thumb      Left trigger thumb  Plan   Under ultrasound injected the A1 pulley region left thumb no complications.  Understands splinting and hopefully this will give her long-term benefit.  We talked about future injections versus surgical intervention answered all questions  Patient ID: Meghan Hernández is a 49 y.o. female.    Hand / UE Inj/Asp: L thumb A1 for trigger finger on 7/24/2025 9:50 AM  Indications: pain  Details: 25 G needle, ultrasound-guided  Medications: 1 mL lidocaine 10  mg/mL (1 %); 20 mg methylPREDNISolone acetate 40 mg/mL  Outcome: tolerated well, no immediate complications    After discussing the risks and benefits of the procedure we proceeded with the injection. Under ultrasound guidance we identified the metacarpal bone and overlying tendon sheath with the FDS and FDP tendons, images obtained and saved. We then sterilely injected the left thumb A1 pulley with a mixture of 20 mg of DepoMedrol and .5 cc of 1% lidocaine. Pt tolerated the procedure well without any adverse reactions.    Procedure, treatment alternatives, risks and benefits explained, specific risks discussed. Consent was given by the patient. Immediately prior to procedure a time out was called to verify the correct patient, procedure, equipment, support staff and site/side marked as required. Patient was prepped and draped in the usual sterile fashion.                      [1]   Past Medical History:  Diagnosis Date    Ankylosing spondylitis of site in spine (Multi) May2023    Anxiety 2022    AS (ankylosing spondylitis) (Multi)     Chronic constipation 1980    Chronic diarrhea 2024    Chronic pain disorder Long time    Headache Entire life    Low back pain In my 20s    Migraine Entire life    Neck pain In my20s    Osteoarthritis May2023    Rheumatoid arthritis    [2]   Past Surgical History:  Procedure Laterality Date    COLONOSCOPY      w endoscopy    DILATION AND CURETTAGE OF UTERUS      EPIDURAL BLOCK INJECTION  Multiple    RADIOFREQUENCY ABLATION  06/2024    lumbar spine   [3] No Known Allergies

## 2025-07-25 ENCOUNTER — OFFICE VISIT (OUTPATIENT)
Dept: PAIN MEDICINE | Facility: CLINIC | Age: 50
End: 2025-07-25
Payer: COMMERCIAL

## 2025-07-25 VITALS
WEIGHT: 138 LBS | HEIGHT: 60 IN | OXYGEN SATURATION: 97 % | HEART RATE: 81 BPM | SYSTOLIC BLOOD PRESSURE: 102 MMHG | BODY MASS INDEX: 27.09 KG/M2 | DIASTOLIC BLOOD PRESSURE: 70 MMHG

## 2025-07-25 DIAGNOSIS — M54.12 RADICULOPATHY, CERVICAL REGION: ICD-10-CM

## 2025-07-25 DIAGNOSIS — M47.817 LUMBOSACRAL SPONDYLOSIS WITHOUT MYELOPATHY: ICD-10-CM

## 2025-07-25 DIAGNOSIS — M45.9 ANKYLOSING SPONDYLITIS, UNSPECIFIED SITE OF SPINE (MULTI): Primary | ICD-10-CM

## 2025-07-25 DIAGNOSIS — M54.12 CERVICAL RADICULOPATHY: ICD-10-CM

## 2025-07-25 DIAGNOSIS — M47.812 CERVICAL SPONDYLOSIS: ICD-10-CM

## 2025-07-25 PROCEDURE — 99214 OFFICE O/P EST MOD 30 MIN: CPT | Performed by: PHYSICIAN ASSISTANT

## 2025-07-25 PROCEDURE — 3008F BODY MASS INDEX DOCD: CPT | Performed by: PHYSICIAN ASSISTANT

## 2025-07-25 RX ORDER — OXYCODONE AND ACETAMINOPHEN 7.5; 325 MG/1; MG/1
1 TABLET ORAL EVERY 6 HOURS PRN
Qty: 120 TABLET | Refills: 0 | Status: SHIPPED | OUTPATIENT
Start: 2025-07-27 | End: 2025-08-26

## 2025-07-25 RX ORDER — TIZANIDINE 4 MG/1
2-4 TABLET ORAL 2 TIMES DAILY PRN
Qty: 60 TABLET | Refills: 2 | Status: SHIPPED | OUTPATIENT
Start: 2025-07-25 | End: 2025-10-23

## 2025-07-25 ASSESSMENT — ENCOUNTER SYMPTOMS
ALLERGIC/IMMUNOLOGIC NEGATIVE: 1
EYES NEGATIVE: 1
NECK PAIN: 1
CONSTITUTIONAL NEGATIVE: 1
RESPIRATORY NEGATIVE: 1
HEMATOLOGIC/LYMPHATIC NEGATIVE: 1
ENDOCRINE NEGATIVE: 1
NEUROLOGICAL NEGATIVE: 1
CARDIOVASCULAR NEGATIVE: 1
GASTROINTESTINAL NEGATIVE: 1
PSYCHIATRIC NEGATIVE: 1

## 2025-07-25 ASSESSMENT — PAIN - FUNCTIONAL ASSESSMENT: PAIN_FUNCTIONAL_ASSESSMENT: 0-10

## 2025-07-25 ASSESSMENT — PAIN DESCRIPTION - DESCRIPTORS: DESCRIPTORS: ACHING

## 2025-07-25 ASSESSMENT — PAIN SCALES - GENERAL
PAINLEVEL_OUTOF10: 6
PAINLEVEL_OUTOF10: 6

## 2025-07-25 NOTE — PROGRESS NOTES
MEDICATION NAME: Percocet  STRENGTH: 735-325 mg  LAST FILL DATE: 25  DATE LAST TAKEN: 25  QUANTITY FILLED: 120  QUANTITY REMAININ  COUNT COMPLETED BY: ROMA DIAZ MA and PITER SHEPHERD LPN      UDS COMPLETED  CONTROLLED SUBSTANCES AGREEMENT SIGNED  ORT COMPLETED  Modified [employer] disability form filled out annually.

## 2025-08-12 ENCOUNTER — PATIENT MESSAGE (OUTPATIENT)
Dept: PAIN MEDICINE | Facility: CLINIC | Age: 50
End: 2025-08-12
Payer: COMMERCIAL

## 2025-08-12 DIAGNOSIS — M45.9 ANKYLOSING SPONDYLITIS, UNSPECIFIED SITE OF SPINE (MULTI): ICD-10-CM

## 2025-08-12 DIAGNOSIS — M47.812 CERVICAL SPONDYLOSIS: ICD-10-CM

## 2025-08-12 DIAGNOSIS — M54.12 RADICULOPATHY, CERVICAL REGION: ICD-10-CM

## 2025-08-12 DIAGNOSIS — R52 ACUTE PAIN: ICD-10-CM

## 2025-08-12 DIAGNOSIS — M47.817 LUMBOSACRAL SPONDYLOSIS WITHOUT MYELOPATHY: ICD-10-CM

## 2025-08-12 DIAGNOSIS — G89.29 CHRONIC RIGHT-SIDED LOW BACK PAIN WITHOUT SCIATICA: ICD-10-CM

## 2025-08-12 DIAGNOSIS — M54.50 CHRONIC RIGHT-SIDED LOW BACK PAIN WITHOUT SCIATICA: ICD-10-CM

## 2025-08-12 DIAGNOSIS — M54.12 CERVICAL RADICULOPATHY: ICD-10-CM

## 2025-08-13 LAB
ALBUMIN SERPL-MCNC: 4.7 G/DL (ref 3.6–5.1)
ALBUMIN/GLOB SERPL: 2 (CALC) (ref 1–2.5)
ALP SERPL-CCNC: 68 U/L (ref 31–125)
ALT SERPL-CCNC: 16 U/L (ref 6–29)
AST SERPL-CCNC: 17 U/L (ref 10–35)
BASOPHILS # BLD AUTO: 30 CELLS/UL (ref 0–200)
BASOPHILS NFR BLD AUTO: 0.6 %
BILIRUB SERPL-MCNC: 0.6 MG/DL (ref 0.2–1.2)
BUN SERPL-MCNC: 8 MG/DL (ref 7–25)
BUN/CREAT SERPL: NORMAL (CALC) (ref 6–22)
CALCIUM SERPL-MCNC: 9.3 MG/DL (ref 8.6–10.2)
CHLORIDE SERPL-SCNC: 101 MMOL/L (ref 98–110)
CO2 SERPL-SCNC: 28 MMOL/L (ref 20–32)
CREAT SERPL-MCNC: 0.72 MG/DL (ref 0.5–0.99)
EGFRCR SERPLBLD CKD-EPI 2021: 102 ML/MIN/1.73M2
EOSINOPHIL # BLD AUTO: 100 CELLS/UL (ref 15–500)
EOSINOPHIL NFR BLD AUTO: 2 %
ERYTHROCYTE [DISTWIDTH] IN BLOOD BY AUTOMATED COUNT: 13.5 % (ref 11–15)
GLOBULIN SER CALC-MCNC: 2.4 G/DL (CALC) (ref 1.9–3.7)
GLUCOSE SERPL-MCNC: 89 MG/DL (ref 65–99)
HCT VFR BLD AUTO: 40 % (ref 35–45)
HGB BLD-MCNC: 12.7 G/DL (ref 11.7–15.5)
LYMPHOCYTES # BLD AUTO: 2310 CELLS/UL (ref 850–3900)
LYMPHOCYTES NFR BLD AUTO: 46.2 %
MCH RBC QN AUTO: 30.1 PG (ref 27–33)
MCHC RBC AUTO-ENTMCNC: 31.8 G/DL (ref 32–36)
MCV RBC AUTO: 94.8 FL (ref 80–100)
MONOCYTES # BLD AUTO: 345 CELLS/UL (ref 200–950)
MONOCYTES NFR BLD AUTO: 6.9 %
NEUTROPHILS # BLD AUTO: 2215 CELLS/UL (ref 1500–7800)
NEUTROPHILS NFR BLD AUTO: 44.3 %
PLATELET # BLD AUTO: 261 THOUSAND/UL (ref 140–400)
PMV BLD REES-ECKER: 9.2 FL (ref 7.5–12.5)
POTASSIUM SERPL-SCNC: 4.3 MMOL/L (ref 3.5–5.3)
PROT SERPL-MCNC: 7.1 G/DL (ref 6.1–8.1)
RBC # BLD AUTO: 4.22 MILLION/UL (ref 3.8–5.1)
SODIUM SERPL-SCNC: 137 MMOL/L (ref 135–146)
WBC # BLD AUTO: 5 THOUSAND/UL (ref 3.8–10.8)

## 2025-08-15 ENCOUNTER — CLINICAL SUPPORT (OUTPATIENT)
Dept: PAIN MEDICINE | Facility: CLINIC | Age: 50
End: 2025-08-15
Payer: COMMERCIAL

## 2025-08-15 PROCEDURE — 99211 OFF/OP EST MAY X REQ PHY/QHP: CPT

## 2025-08-15 RX ORDER — HYDROCODONE BITARTRATE AND ACETAMINOPHEN 10; 325 MG/1; MG/1
1 TABLET ORAL DAILY
Qty: 21 TABLET | Refills: 0 | Status: SHIPPED | OUTPATIENT
Start: 2025-08-15 | End: 2025-09-05

## 2025-08-15 RX ORDER — METHYLPREDNISOLONE 4 MG/1
TABLET ORAL
Qty: 21 TABLET | Refills: 0 | Status: SHIPPED | OUTPATIENT
Start: 2025-08-15 | End: 2025-08-21

## 2025-08-28 ENCOUNTER — TELEMEDICINE (OUTPATIENT)
Dept: PAIN MEDICINE | Facility: CLINIC | Age: 50
End: 2025-08-28
Payer: COMMERCIAL

## 2025-08-28 VITALS — WEIGHT: 138 LBS | HEIGHT: 60 IN | BODY MASS INDEX: 27.09 KG/M2

## 2025-08-28 DIAGNOSIS — M47.817 LUMBOSACRAL SPONDYLOSIS WITHOUT MYELOPATHY: ICD-10-CM

## 2025-08-28 DIAGNOSIS — M45.9 ANKYLOSING SPONDYLITIS, UNSPECIFIED SITE OF SPINE (MULTI): ICD-10-CM

## 2025-08-28 DIAGNOSIS — M54.12 RADICULOPATHY, CERVICAL REGION: ICD-10-CM

## 2025-08-28 PROCEDURE — 3008F BODY MASS INDEX DOCD: CPT | Performed by: PHYSICIAN ASSISTANT

## 2025-08-28 PROCEDURE — 99212 OFFICE O/P EST SF 10 MIN: CPT | Performed by: PHYSICIAN ASSISTANT

## 2025-08-28 RX ORDER — HYDROCODONE BITARTRATE AND ACETAMINOPHEN 10; 325 MG/1; MG/1
1 TABLET ORAL EVERY 6 HOURS PRN
Qty: 120 TABLET | Refills: 0 | Status: SHIPPED | OUTPATIENT
Start: 2025-08-30 | End: 2025-09-29

## 2025-08-28 ASSESSMENT — PAIN - FUNCTIONAL ASSESSMENT: PAIN_FUNCTIONAL_ASSESSMENT: 0-10

## 2025-08-28 ASSESSMENT — PAIN SCALES - GENERAL
PAINLEVEL_OUTOF10: 8
PAINLEVEL_OUTOF10: 8

## 2025-08-28 ASSESSMENT — PAIN DESCRIPTION - DESCRIPTORS: DESCRIPTORS: THROBBING;ACHING

## 2025-09-05 ENCOUNTER — ANCILLARY PROCEDURE (OUTPATIENT)
Dept: URGENT CARE | Age: 50
End: 2025-09-05
Payer: COMMERCIAL

## 2025-09-05 ENCOUNTER — OFFICE VISIT (OUTPATIENT)
Dept: URGENT CARE | Age: 50
End: 2025-09-05
Payer: COMMERCIAL

## 2025-09-05 VITALS
DIASTOLIC BLOOD PRESSURE: 77 MMHG | OXYGEN SATURATION: 99 % | TEMPERATURE: 98.1 F | HEART RATE: 74 BPM | SYSTOLIC BLOOD PRESSURE: 118 MMHG | RESPIRATION RATE: 18 BRPM

## 2025-09-05 DIAGNOSIS — J18.9 PNEUMONIA OF LEFT LUNG DUE TO INFECTIOUS ORGANISM, UNSPECIFIED PART OF LUNG: ICD-10-CM

## 2025-09-05 DIAGNOSIS — R06.02 SOB (SHORTNESS OF BREATH): ICD-10-CM

## 2025-09-05 LAB
POC CORONAVIRUS SARS-COV-2 PCR: NEGATIVE
POC HUMAN RHINOVIRUS PCR: NEGATIVE
POC INFLUENZA A VIRUS PCR: NEGATIVE
POC INFLUENZA B VIRUS PCR: NEGATIVE
POC RESPIRATORY SYNCYTIAL VIRUS PCR: NEGATIVE

## 2025-09-05 PROCEDURE — 71046 X-RAY EXAM CHEST 2 VIEWS: CPT | Performed by: PHYSICIAN ASSISTANT

## 2025-09-05 RX ORDER — AMOXICILLIN AND CLAVULANATE POTASSIUM 875; 125 MG/1; MG/1
875 TABLET, FILM COATED ORAL 2 TIMES DAILY
Qty: 10 TABLET | Refills: 0 | Status: SHIPPED | OUTPATIENT
Start: 2025-09-05 | End: 2025-09-10

## 2025-09-05 RX ORDER — AZITHROMYCIN 250 MG/1
TABLET, FILM COATED ORAL
Qty: 6 TABLET | Refills: 0 | Status: SHIPPED | OUTPATIENT
Start: 2025-09-05

## 2025-09-05 RX ORDER — ALBUTEROL SULFATE 90 UG/1
2 INHALANT RESPIRATORY (INHALATION) EVERY 6 HOURS PRN
Qty: 18 G | Refills: 0 | Status: SHIPPED | OUTPATIENT
Start: 2025-09-05 | End: 2026-09-05

## 2025-09-05 RX ORDER — BENZONATATE 200 MG/1
200 CAPSULE ORAL 3 TIMES DAILY PRN
Qty: 30 CAPSULE | Refills: 0 | Status: SHIPPED | OUTPATIENT
Start: 2025-09-05 | End: 2025-09-15

## 2025-09-05 ASSESSMENT — ENCOUNTER SYMPTOMS: COUGH: 1
